# Patient Record
Sex: FEMALE | Race: BLACK OR AFRICAN AMERICAN | Employment: FULL TIME | URBAN - METROPOLITAN AREA
[De-identification: names, ages, dates, MRNs, and addresses within clinical notes are randomized per-mention and may not be internally consistent; named-entity substitution may affect disease eponyms.]

---

## 2017-06-02 ENCOUNTER — HOSPITAL ENCOUNTER (OUTPATIENT)
Dept: GENERAL RADIOLOGY | Age: 22
Discharge: HOME | End: 2017-06-02
Attending: INTERNAL MEDICINE | Admitting: INTERNAL MEDICINE

## 2017-06-02 LAB
ALBUMIN SERPL-MCNC: 4.5 GM/DL (ref 3.4–5)
ALP BLD-CCNC: 65 IU/L (ref 40–128)
ALT SERPL-CCNC: 9 U/L (ref 10–40)
ANION GAP SERPL CALCULATED.3IONS-SCNC: 22 MMOL/L (ref 4–16)
AST SERPL-CCNC: 10 IU/L (ref 15–37)
BILIRUB SERPL-MCNC: 0.6 MG/DL (ref 0–1)
BUN BLDV-MCNC: 8 MG/DL (ref 6–23)
CALCIUM SERPL-MCNC: 10 MG/DL (ref 8.3–10.6)
CHLORIDE BLD-SCNC: 93 MMOL/L (ref 99–110)
CHOLESTEROL: 222 MG/DL
CO2: 22 MMOL/L (ref 21–32)
CREAT SERPL-MCNC: 0.5 MG/DL (ref 0.6–1.1)
GFR AFRICAN AMERICAN: >60 ML/MIN/1.73M2
GFR NON-AFRICAN AMERICAN: >60 ML/MIN/1.73M2
GLUCOSE BLD-MCNC: 319 MG/DL (ref 70–140)
HDLC SERPL-MCNC: 47 MG/DL
LDL CHOLESTEROL DIRECT: 171 MG/DL
POTASSIUM SERPL-SCNC: 4.5 MMOL/L (ref 3.5–5.1)
SODIUM BLD-SCNC: 137 MMOL/L (ref 135–145)
TOTAL PROTEIN: 7.7 GM/DL (ref 6.4–8.2)
TRIGL SERPL-MCNC: 148 MG/DL

## 2017-06-05 LAB
ESTIMATED AVERAGE GLUCOSE: 346 MG/DL
HBA1C MFR BLD: 13.7 % (ref 4.2–6.3)

## 2017-09-11 ENCOUNTER — HOSPITAL ENCOUNTER (OUTPATIENT)
Dept: GENERAL RADIOLOGY | Age: 22
Discharge: HOME | End: 2017-09-11
Attending: INTERNAL MEDICINE | Admitting: INTERNAL MEDICINE

## 2017-09-11 LAB
ALBUMIN SERPL-MCNC: 4.3 GM/DL (ref 3.4–5)
ALP BLD-CCNC: 67 IU/L (ref 40–128)
ALT SERPL-CCNC: 8 U/L (ref 10–40)
ANION GAP SERPL CALCULATED.3IONS-SCNC: 24 MMOL/L (ref 4–16)
AST SERPL-CCNC: 10 IU/L (ref 15–37)
BILIRUB SERPL-MCNC: 0.4 MG/DL (ref 0–1)
BUN BLDV-MCNC: 10 MG/DL (ref 6–23)
CALCIUM SERPL-MCNC: 9.5 MG/DL (ref 8.3–10.6)
CHLORIDE BLD-SCNC: 93 MMOL/L (ref 99–110)
CHOLESTEROL: 208 MG/DL
CO2: 13 MMOL/L (ref 21–32)
CREAT SERPL-MCNC: 0.5 MG/DL (ref 0.6–1.1)
ESTIMATED AVERAGE GLUCOSE: 338 MG/DL
GFR AFRICAN AMERICAN: >60 ML/MIN/1.73M2
GFR NON-AFRICAN AMERICAN: >60 ML/MIN/1.73M2
GLUCOSE FASTING: 359 MG/DL (ref 70–99)
HBA1C MFR BLD: 13.4 % (ref 4.2–6.3)
HDLC SERPL-MCNC: 39 MG/DL
LDL CHOLESTEROL DIRECT: 158 MG/DL
POTASSIUM SERPL-SCNC: 4.7 MMOL/L (ref 3.5–5.1)
SODIUM BLD-SCNC: 130 MMOL/L (ref 135–145)
TOTAL PROTEIN: 7.7 GM/DL (ref 6.4–8.2)
TRIGL SERPL-MCNC: 200 MG/DL

## 2017-12-11 ENCOUNTER — HOSPITAL ENCOUNTER (OUTPATIENT)
Dept: GENERAL RADIOLOGY | Age: 22
Discharge: HOME | End: 2017-12-11
Attending: INTERNAL MEDICINE | Admitting: INTERNAL MEDICINE

## 2017-12-11 LAB
ALBUMIN SERPL-MCNC: 4.1 GM/DL (ref 3.4–5)
ALP BLD-CCNC: 60 IU/L (ref 40–128)
ALT SERPL-CCNC: 7 U/L (ref 10–40)
ANION GAP SERPL CALCULATED.3IONS-SCNC: 21 MMOL/L (ref 4–16)
AST SERPL-CCNC: 9 IU/L (ref 15–37)
BILIRUB SERPL-MCNC: 0.5 MG/DL (ref 0–1)
BUN BLDV-MCNC: 6 MG/DL (ref 6–23)
CALCIUM SERPL-MCNC: 9.1 MG/DL (ref 8.3–10.6)
CHLORIDE BLD-SCNC: 92 MMOL/L (ref 99–110)
CHOLESTEROL: 205 MG/DL
CO2: 20 MMOL/L (ref 21–32)
CREAT SERPL-MCNC: 0.4 MG/DL (ref 0.6–1.1)
CREATININE URINE: 34 MG/DL (ref 28–217)
GFR AFRICAN AMERICAN: >60 ML/MIN/1.73M2
GFR NON-AFRICAN AMERICAN: >60 ML/MIN/1.73M2
GLUCOSE FASTING: 326 MG/DL (ref 70–99)
HDLC SERPL-MCNC: 36 MG/DL
LDL CHOLESTEROL DIRECT: 158 MG/DL
MICROALBUMIN/CREAT 24H UR: <1.2 MG/DL
MICROALBUMIN/CREAT UR-RTO: NORMAL MG/G CREAT (ref 0–30)
POTASSIUM SERPL-SCNC: 4.1 MMOL/L (ref 3.5–5.1)
SODIUM BLD-SCNC: 133 MMOL/L (ref 135–145)
TOTAL PROTEIN: 7.1 GM/DL (ref 6.4–8.2)
TRIGL SERPL-MCNC: 164 MG/DL

## 2017-12-14 LAB
ESTIMATED AVERAGE GLUCOSE: 398 MG/DL
HBA1C MFR BLD: 15.5 % (ref 4.2–6.3)

## 2017-12-28 PROBLEM — E10.10 DKA, TYPE 1, NOT AT GOAL (HCC): Status: ACTIVE | Noted: 2017-12-28

## 2018-01-15 ENCOUNTER — HOSPITAL ENCOUNTER (OUTPATIENT)
Dept: DIABETES SERVICES | Age: 23
Discharge: HOME | End: 2018-01-31
Attending: INTERNAL MEDICINE | Admitting: INTERNAL MEDICINE

## 2018-01-15 VITALS — BODY MASS INDEX: 27.36 KG/M2 | HEIGHT: 63 IN | WEIGHT: 154.4 LBS

## 2018-01-15 NOTE — LETTER
of actual CHO intake with BG results. May benefit from additional follow up to reinforce and assess understanding of meal plan and self management skills. PATIENT SELECTED GOAL: record BG results on log sheet, begin CHO counting at meals and snacks       There will be a follow-up in 4 months to evaluate A1C, carbohydrate recall, attainment of their chosen goal, and self identified support plan. Thank you for referring this patient to our program.  Please do not hesitate to call if you have any questions at [phone].         Sincerely,    Electronically signed by Liana Little RD, LD on 1/15/2018 at 11:17 AM      Diabetes Nurse Educators:   Registered Licensed Dietitians:  Rachel Escobedo RN, BSN, CDE              Ju Kent RD, LD, CDE  Rosario Ramirez, RN, BSN, [facility]

## 2018-01-22 PROBLEM — R80.9 PROTEINURIA: Status: ACTIVE | Noted: 2018-01-22

## 2018-01-22 PROBLEM — N18.1 CHRONIC KIDNEY DISEASE (CKD) STAGE G1/A1, GLOMERULAR FILTRATION RATE (GFR) EQUAL TO OR GREATER THAN 90 ML/MIN/1.73 SQUARE METER AND ALBUMINURIA CREATININE RATIO LESS THAN 30 MG/G: Status: ACTIVE | Noted: 2018-01-22

## 2018-01-22 PROBLEM — E11.9 DM (DIABETES MELLITUS) (HCC): Status: ACTIVE | Noted: 2018-01-22

## 2018-02-01 ENCOUNTER — HOSPITAL ENCOUNTER (OUTPATIENT)
Dept: OTHER | Age: 23
Discharge: HOME | End: 2018-02-28
Attending: INTERNAL MEDICINE | Admitting: INTERNAL MEDICINE

## 2018-03-26 ENCOUNTER — HOSPITAL ENCOUNTER (OUTPATIENT)
Dept: DIABETES SERVICES | Age: 23
Discharge: HOME | End: 2018-03-31
Attending: INTERNAL MEDICINE | Admitting: INTERNAL MEDICINE

## 2018-03-26 VITALS — WEIGHT: 140 LBS | BODY MASS INDEX: 23.32 KG/M2 | HEIGHT: 65 IN

## 2018-04-01 ENCOUNTER — HOSPITAL ENCOUNTER (OUTPATIENT)
Dept: OTHER | Age: 23
Discharge: HOME | End: 2018-04-30
Attending: INTERNAL MEDICINE | Admitting: INTERNAL MEDICINE

## 2018-04-02 ENCOUNTER — HOSPITAL ENCOUNTER (OUTPATIENT)
Dept: GENERAL RADIOLOGY | Age: 23
Discharge: HOME | End: 2018-04-02
Attending: INTERNAL MEDICINE | Admitting: INTERNAL MEDICINE

## 2018-04-02 LAB
ALBUMIN SERPL-MCNC: 3.9 GM/DL (ref 3.4–5)
ANION GAP SERPL CALCULATED.3IONS-SCNC: 18 MMOL/L (ref 4–16)
BACTERIA: ABNORMAL /HPF
BILIRUBIN URINE: NEGATIVE MG/DL
BLOOD, URINE: NEGATIVE
BUN BLDV-MCNC: 7 MG/DL (ref 6–23)
CALCIUM SERPL-MCNC: 9.3 MG/DL (ref 8.3–10.6)
CHLORIDE BLD-SCNC: 100 MMOL/L (ref 99–110)
CLARITY: CLEAR
CO2: 19 MMOL/L (ref 21–32)
COLOR: YELLOW
CREAT SERPL-MCNC: 0.4 MG/DL (ref 0.6–1.1)
CREATININE URINE: 89.5 MG/DL (ref 28–217)
GFR AFRICAN AMERICAN: >60 ML/MIN/1.73M2
GFR NON-AFRICAN AMERICAN: >60 ML/MIN/1.73M2
GLUCOSE BLD-MCNC: 328 MG/DL (ref 70–99)
GLUCOSE, URINE: >500 MG/DL
KETONES, URINE: ABNORMAL MG/DL
LEUKOCYTE ESTERASE, URINE: NEGATIVE
MAGNESIUM: 1.7 MG/DL (ref 1.8–2.4)
MUCUS: ABNORMAL HPF
NITRITE URINE, QUANTITATIVE: NEGATIVE
PH, URINE: 5 (ref 5–8)
PHOSPHORUS: 3.7 MG/DL (ref 2.5–4.9)
POTASSIUM SERPL-SCNC: 4.5 MMOL/L (ref 3.5–5.1)
PROT/CREAT RATIO, UR: 0.1
PROTEIN UA: NEGATIVE MG/DL
RBC URINE: <1 /HPF (ref 0–6)
SODIUM BLD-SCNC: 137 MMOL/L (ref 135–145)
SPECIFIC GRAVITY UA: 1.04 (ref 1–1.03)
SQUAMOUS EPITHELIAL: 1 /HPF
TRICHOMONAS: ABNORMAL /HPF
URINE TOTAL PROTEIN: 10.3 MG/DL
UROBILINOGEN, URINE: NORMAL MG/DL (ref 0.2–1)
WBC UA: 1 /HPF (ref 0–5)

## 2018-05-01 ENCOUNTER — HOSPITAL ENCOUNTER (OUTPATIENT)
Dept: OTHER | Age: 23
Discharge: HOME | End: 2018-05-31
Attending: INTERNAL MEDICINE | Admitting: INTERNAL MEDICINE

## 2018-06-01 ENCOUNTER — HOSPITAL ENCOUNTER (OUTPATIENT)
Dept: OTHER | Age: 23
Discharge: HOME | End: 2018-06-30
Attending: INTERNAL MEDICINE | Admitting: INTERNAL MEDICINE

## 2019-03-05 LAB
A/G RATIO: 1.5 (ref 1.1–2.2)
ALBUMIN SERPL-MCNC: 4.4 G/DL (ref 3.4–5)
ALP BLD-CCNC: 63 U/L (ref 40–129)
ALT SERPL-CCNC: 6 U/L (ref 10–40)
ANION GAP SERPL CALCULATED.3IONS-SCNC: 18 MMOL/L (ref 3–16)
AST SERPL-CCNC: 7 U/L (ref 15–37)
BILIRUB SERPL-MCNC: 0.6 MG/DL (ref 0–1)
BUN BLDV-MCNC: 8 MG/DL (ref 7–20)
CALCIUM SERPL-MCNC: 9.6 MG/DL (ref 8.3–10.6)
CHLORIDE BLD-SCNC: 96 MMOL/L (ref 99–110)
CHOLESTEROL, FASTING: 202 MG/DL (ref 0–199)
CO2: 23 MMOL/L (ref 21–32)
CREAT SERPL-MCNC: <0.5 MG/DL (ref 0.6–1.1)
GFR AFRICAN AMERICAN: >60
GFR NON-AFRICAN AMERICAN: >60
GLOBULIN: 3 G/DL
GLUCOSE BLD-MCNC: 393 MG/DL (ref 70–99)
HDLC SERPL-MCNC: 47 MG/DL (ref 40–60)
LDL CHOLESTEROL CALCULATED: 137 MG/DL
POTASSIUM SERPL-SCNC: 4.3 MMOL/L (ref 3.5–5.1)
SODIUM BLD-SCNC: 137 MMOL/L (ref 136–145)
TOTAL PROTEIN: 7.4 G/DL (ref 6.4–8.2)
TRIGLYCERIDE, FASTING: 88 MG/DL (ref 0–150)
VLDLC SERPL CALC-MCNC: 18 MG/DL

## 2019-03-06 LAB
ESTIMATED AVERAGE GLUCOSE: 346.5 MG/DL
HBA1C MFR BLD: 13.7 %

## 2019-09-04 LAB
A/G RATIO: 1.6 (ref 1.1–2.2)
ALBUMIN SERPL-MCNC: 4.7 G/DL (ref 3.4–5)
ALP BLD-CCNC: 68 U/L (ref 40–129)
ALT SERPL-CCNC: 7 U/L (ref 10–40)
ANION GAP SERPL CALCULATED.3IONS-SCNC: 17 MMOL/L (ref 3–16)
AST SERPL-CCNC: 7 U/L (ref 15–37)
BILIRUB SERPL-MCNC: 0.5 MG/DL (ref 0–1)
BUN BLDV-MCNC: 9 MG/DL (ref 7–20)
CALCIUM SERPL-MCNC: 9.8 MG/DL (ref 8.3–10.6)
CHLORIDE BLD-SCNC: 91 MMOL/L (ref 99–110)
CHOLESTEROL, FASTING: 185 MG/DL (ref 0–199)
CO2: 20 MMOL/L (ref 21–32)
CREAT SERPL-MCNC: 0.5 MG/DL (ref 0.6–1.1)
GFR AFRICAN AMERICAN: >60
GFR NON-AFRICAN AMERICAN: >60
GLOBULIN: 2.9 G/DL
GLUCOSE FASTING: 397 MG/DL (ref 70–99)
HDLC SERPL-MCNC: 40 MG/DL (ref 40–60)
LDL CHOLESTEROL CALCULATED: 107 MG/DL
POTASSIUM SERPL-SCNC: 4.6 MMOL/L (ref 3.5–5.1)
SODIUM BLD-SCNC: 128 MMOL/L (ref 136–145)
TOTAL PROTEIN: 7.6 G/DL (ref 6.4–8.2)
TRIGLYCERIDE, FASTING: 191 MG/DL (ref 0–150)
VLDLC SERPL CALC-MCNC: 38 MG/DL

## 2019-09-05 LAB
ESTIMATED AVERAGE GLUCOSE: 386.7 MG/DL
HBA1C MFR BLD: 15.1 %

## 2020-03-13 ENCOUNTER — HOSPITAL ENCOUNTER (INPATIENT)
Age: 25
LOS: 3 days | Discharge: HOME | DRG: 420 | End: 2020-03-16
Attending: EMERGENCY MEDICINE | Admitting: INTERNAL MEDICINE
Payer: MEDICAID

## 2020-03-13 ENCOUNTER — APPOINTMENT (OUTPATIENT)
Dept: GENERAL RADIOLOGY | Age: 25
DRG: 420 | End: 2020-03-13
Payer: MEDICAID

## 2020-03-13 PROBLEM — J10.1 INFLUENZA A: Status: ACTIVE | Noted: 2020-03-13

## 2020-03-13 LAB
ALBUMIN SERPL-MCNC: 4.6 GM/DL (ref 3.4–5)
ALP BLD-CCNC: 88 IU/L (ref 40–129)
ALT SERPL-CCNC: 7 U/L (ref 10–40)
ANION GAP SERPL CALCULATED.3IONS-SCNC: 18 MMOL/L (ref 4–16)
ANION GAP SERPL CALCULATED.3IONS-SCNC: 28 MMOL/L (ref 4–16)
AST SERPL-CCNC: 9 IU/L (ref 15–37)
BACTERIA: NEGATIVE /HPF
BASOPHILS ABSOLUTE: 0.1 K/CU MM
BASOPHILS RELATIVE PERCENT: 0.6 % (ref 0–1)
BETA-HYDROXYBUTYRATE: >20.8 MG/DL (ref 0–3)
BILIRUB SERPL-MCNC: 0.2 MG/DL (ref 0–1)
BILIRUBIN URINE: NEGATIVE MG/DL
BLOOD, URINE: NEGATIVE
BUN BLDV-MCNC: 12 MG/DL (ref 6–23)
BUN BLDV-MCNC: 13 MG/DL (ref 6–23)
CALCIUM SERPL-MCNC: 8.2 MG/DL (ref 8.3–10.6)
CALCIUM SERPL-MCNC: 9.4 MG/DL (ref 8.3–10.6)
CHLORIDE BLD-SCNC: 110 MMOL/L (ref 99–110)
CHLORIDE BLD-SCNC: 99 MMOL/L (ref 99–110)
CLARITY: CLEAR
CO2: 4 MMOL/L (ref 21–32)
CO2: 6 MMOL/L (ref 21–32)
COLOR: ABNORMAL
CREAT SERPL-MCNC: 0.4 MG/DL (ref 0.6–1.1)
CREAT SERPL-MCNC: 0.5 MG/DL (ref 0.6–1.1)
DIFFERENTIAL TYPE: ABNORMAL
EOSINOPHILS ABSOLUTE: 0 K/CU MM
EOSINOPHILS RELATIVE PERCENT: 0 % (ref 0–3)
GFR AFRICAN AMERICAN: >60 ML/MIN/1.73M2
GFR AFRICAN AMERICAN: >60 ML/MIN/1.73M2
GFR NON-AFRICAN AMERICAN: >60 ML/MIN/1.73M2
GFR NON-AFRICAN AMERICAN: >60 ML/MIN/1.73M2
GLUCOSE BLD-MCNC: 214 MG/DL (ref 70–99)
GLUCOSE BLD-MCNC: 224 MG/DL (ref 70–99)
GLUCOSE BLD-MCNC: 279 MG/DL (ref 70–99)
GLUCOSE BLD-MCNC: 297 MG/DL (ref 70–99)
GLUCOSE BLD-MCNC: 319 MG/DL (ref 70–99)
GLUCOSE BLD-MCNC: 332 MG/DL (ref 70–99)
GLUCOSE BLD-MCNC: 336 MG/DL (ref 70–99)
GLUCOSE BLD-MCNC: 396 MG/DL (ref 70–99)
GLUCOSE BLD-MCNC: 427 MG/DL (ref 70–99)
GLUCOSE BLD-MCNC: 470 MG/DL (ref 70–99)
GLUCOSE BLD-MCNC: 471 MG/DL (ref 70–99)
GLUCOSE BLD-MCNC: 524 MG/DL (ref 70–99)
GLUCOSE, URINE: >500 MG/DL
HCG QUALITATIVE: NEGATIVE
HCT VFR BLD CALC: 56 % (ref 37–47)
HEMOGLOBIN: 17.8 GM/DL (ref 12.5–16)
IMMATURE NEUTROPHIL %: 1.2 % (ref 0–0.43)
KETONES, URINE: ABNORMAL MG/DL
LACTIC ACID, SEPSIS: 1.5 MMOL/L (ref 0.5–1.9)
LACTIC ACID, SEPSIS: 1.6 MMOL/L (ref 0.5–1.9)
LEUKOCYTE ESTERASE, URINE: NEGATIVE
LIPASE: 14 IU/L (ref 13–60)
LYMPHOCYTES ABSOLUTE: 1.7 K/CU MM
LYMPHOCYTES RELATIVE PERCENT: 12.7 % (ref 24–44)
MCH RBC QN AUTO: 29.4 PG (ref 27–31)
MCHC RBC AUTO-ENTMCNC: 31.8 % (ref 32–36)
MCV RBC AUTO: 92.4 FL (ref 78–100)
MONOCYTES ABSOLUTE: 0.5 K/CU MM
MONOCYTES RELATIVE PERCENT: 3.8 % (ref 0–4)
MUCUS: ABNORMAL HPF
NITRITE URINE, QUANTITATIVE: NEGATIVE
NUCLEATED RBC %: 0 %
PDW BLD-RTO: 11.6 % (ref 11.7–14.9)
PH VENOUS: 6.88 (ref 7.32–7.42)
PH, URINE: 5 (ref 5–8)
PLATELET # BLD: 337 K/CU MM (ref 140–440)
PMV BLD AUTO: 10.1 FL (ref 7.5–11.1)
POTASSIUM SERPL-SCNC: 4.2 MMOL/L (ref 3.5–5.1)
POTASSIUM SERPL-SCNC: 5.1 MMOL/L (ref 3.5–5.1)
PROCALCITONIN: 0.19
PROTEIN UA: 30 MG/DL
RAPID INFLUENZA  B AGN: NEGATIVE
RAPID INFLUENZA A AGN: POSITIVE
RBC # BLD: 6.06 M/CU MM (ref 4.2–5.4)
RBC URINE: <1 /HPF (ref 0–6)
SEGMENTED NEUTROPHILS ABSOLUTE COUNT: 10.8 K/CU MM
SEGMENTED NEUTROPHILS RELATIVE PERCENT: 81.7 % (ref 36–66)
SODIUM BLD-SCNC: 131 MMOL/L (ref 135–145)
SODIUM BLD-SCNC: 134 MMOL/L (ref 135–145)
SPECIFIC GRAVITY UA: 1.02 (ref 1–1.03)
SQUAMOUS EPITHELIAL: 1 /HPF
TOTAL IMMATURE NEUTOROPHIL: 0.16 K/CU MM
TOTAL NUCLEATED RBC: 0 K/CU MM
TOTAL PROTEIN: 9.2 GM/DL (ref 6.4–8.2)
TRICHOMONAS: ABNORMAL /HPF
UROBILINOGEN, URINE: NORMAL MG/DL (ref 0.2–1)
WBC # BLD: 13.2 K/CU MM (ref 4–10.5)
WBC UA: <1 /HPF (ref 0–5)

## 2020-03-13 PROCEDURE — 2580000003 HC RX 258

## 2020-03-13 PROCEDURE — 2580000003 HC RX 258: Performed by: INTERNAL MEDICINE

## 2020-03-13 PROCEDURE — 6370000000 HC RX 637 (ALT 250 FOR IP): Performed by: INTERNAL MEDICINE

## 2020-03-13 PROCEDURE — 85025 COMPLETE CBC W/AUTO DIFF WBC: CPT

## 2020-03-13 PROCEDURE — 2500000003 HC RX 250 WO HCPCS: Performed by: EMERGENCY MEDICINE

## 2020-03-13 PROCEDURE — 2580000003 HC RX 258: Performed by: PHYSICIAN ASSISTANT

## 2020-03-13 PROCEDURE — 2580000003 HC RX 258: Performed by: EMERGENCY MEDICINE

## 2020-03-13 PROCEDURE — 83690 ASSAY OF LIPASE: CPT

## 2020-03-13 PROCEDURE — 81001 URINALYSIS AUTO W/SCOPE: CPT

## 2020-03-13 PROCEDURE — 96372 THER/PROPH/DIAG INJ SC/IM: CPT

## 2020-03-13 PROCEDURE — 71045 X-RAY EXAM CHEST 1 VIEW: CPT

## 2020-03-13 PROCEDURE — 83605 ASSAY OF LACTIC ACID: CPT

## 2020-03-13 PROCEDURE — 87804 INFLUENZA ASSAY W/OPTIC: CPT

## 2020-03-13 PROCEDURE — 82010 KETONE BODYS QUAN: CPT

## 2020-03-13 PROCEDURE — 6370000000 HC RX 637 (ALT 250 FOR IP): Performed by: PHYSICIAN ASSISTANT

## 2020-03-13 PROCEDURE — 6360000002 HC RX W HCPCS: Performed by: EMERGENCY MEDICINE

## 2020-03-13 PROCEDURE — 80053 COMPREHEN METABOLIC PANEL: CPT

## 2020-03-13 PROCEDURE — 84703 CHORIONIC GONADOTROPIN ASSAY: CPT

## 2020-03-13 PROCEDURE — 80048 BASIC METABOLIC PNL TOTAL CA: CPT

## 2020-03-13 PROCEDURE — 6360000002 HC RX W HCPCS: Performed by: PHYSICIAN ASSISTANT

## 2020-03-13 PROCEDURE — 87086 URINE CULTURE/COLONY COUNT: CPT

## 2020-03-13 PROCEDURE — 6360000002 HC RX W HCPCS: Performed by: NURSE PRACTITIONER

## 2020-03-13 PROCEDURE — 2580000003 HC RX 258: Performed by: NURSE PRACTITIONER

## 2020-03-13 PROCEDURE — 87040 BLOOD CULTURE FOR BACTERIA: CPT

## 2020-03-13 PROCEDURE — 96374 THER/PROPH/DIAG INJ IV PUSH: CPT

## 2020-03-13 PROCEDURE — 94761 N-INVAS EAR/PLS OXIMETRY MLT: CPT

## 2020-03-13 PROCEDURE — 6370000000 HC RX 637 (ALT 250 FOR IP): Performed by: NURSE PRACTITIONER

## 2020-03-13 PROCEDURE — 2000000000 HC ICU R&B

## 2020-03-13 PROCEDURE — 99285 EMERGENCY DEPT VISIT HI MDM: CPT

## 2020-03-13 PROCEDURE — 96375 TX/PRO/DX INJ NEW DRUG ADDON: CPT

## 2020-03-13 PROCEDURE — 82962 GLUCOSE BLOOD TEST: CPT

## 2020-03-13 PROCEDURE — 82800 BLOOD PH: CPT

## 2020-03-13 PROCEDURE — 84145 PROCALCITONIN (PCT): CPT

## 2020-03-13 PROCEDURE — C9113 INJ PANTOPRAZOLE SODIUM, VIA: HCPCS | Performed by: NURSE PRACTITIONER

## 2020-03-13 RX ORDER — MAGNESIUM SULFATE 1 G/100ML
1 INJECTION INTRAVENOUS PRN
Status: DISCONTINUED | OUTPATIENT
Start: 2020-03-13 | End: 2020-03-14

## 2020-03-13 RX ORDER — DICYCLOMINE HYDROCHLORIDE 10 MG/ML
20 INJECTION INTRAMUSCULAR ONCE
Status: COMPLETED | OUTPATIENT
Start: 2020-03-13 | End: 2020-03-13

## 2020-03-13 RX ORDER — 0.9 % SODIUM CHLORIDE 0.9 %
1000 INTRAVENOUS SOLUTION INTRAVENOUS ONCE
Status: COMPLETED | OUTPATIENT
Start: 2020-03-13 | End: 2020-03-13

## 2020-03-13 RX ORDER — INSULIN GLARGINE 100 [IU]/ML
45 INJECTION, SOLUTION SUBCUTANEOUS NIGHTLY
Status: ON HOLD | COMMUNITY
End: 2020-03-16 | Stop reason: SDUPTHER

## 2020-03-13 RX ORDER — PREGABALIN 100 MG/1
100 CAPSULE ORAL 2 TIMES DAILY
Status: DISCONTINUED | OUTPATIENT
Start: 2020-03-13 | End: 2020-03-16 | Stop reason: HOSPADM

## 2020-03-13 RX ORDER — DEXTROSE AND SODIUM CHLORIDE 5; .45 G/100ML; G/100ML
INJECTION, SOLUTION INTRAVENOUS CONTINUOUS PRN
Status: DISCONTINUED | OUTPATIENT
Start: 2020-03-13 | End: 2020-03-14 | Stop reason: ALTCHOICE

## 2020-03-13 RX ORDER — DEXTROSE MONOHYDRATE 25 G/50ML
12.5 INJECTION, SOLUTION INTRAVENOUS PRN
Status: DISCONTINUED | OUTPATIENT
Start: 2020-03-13 | End: 2020-03-16 | Stop reason: HOSPADM

## 2020-03-13 RX ORDER — PANTOPRAZOLE SODIUM 40 MG/10ML
40 INJECTION, POWDER, LYOPHILIZED, FOR SOLUTION INTRAVENOUS DAILY
Status: DISCONTINUED | OUTPATIENT
Start: 2020-03-13 | End: 2020-03-16 | Stop reason: HOSPADM

## 2020-03-13 RX ORDER — OSELTAMIVIR PHOSPHATE 6 MG/ML
75 FOR SUSPENSION ORAL 2 TIMES DAILY
Status: DISCONTINUED | OUTPATIENT
Start: 2020-03-13 | End: 2020-03-16 | Stop reason: HOSPADM

## 2020-03-13 RX ORDER — SODIUM CHLORIDE 450 MG/100ML
INJECTION, SOLUTION INTRAVENOUS CONTINUOUS
Status: DISCONTINUED | OUTPATIENT
Start: 2020-03-13 | End: 2020-03-14 | Stop reason: ALTCHOICE

## 2020-03-13 RX ORDER — POTASSIUM CHLORIDE 7.45 MG/ML
10 INJECTION INTRAVENOUS PRN
Status: DISCONTINUED | OUTPATIENT
Start: 2020-03-13 | End: 2020-03-16 | Stop reason: HOSPADM

## 2020-03-13 RX ORDER — DEXTROSE MONOHYDRATE 50 MG/ML
100 INJECTION, SOLUTION INTRAVENOUS PRN
Status: DISCONTINUED | OUTPATIENT
Start: 2020-03-13 | End: 2020-03-16 | Stop reason: HOSPADM

## 2020-03-13 RX ORDER — LOSARTAN POTASSIUM 25 MG/1
25 TABLET ORAL DAILY
Status: DISCONTINUED | OUTPATIENT
Start: 2020-03-13 | End: 2020-03-16 | Stop reason: HOSPADM

## 2020-03-13 RX ORDER — GUAIFENESIN 600 MG/1
600 TABLET, EXTENDED RELEASE ORAL 2 TIMES DAILY
Status: DISCONTINUED | OUTPATIENT
Start: 2020-03-13 | End: 2020-03-16 | Stop reason: HOSPADM

## 2020-03-13 RX ORDER — SODIUM CHLORIDE 9 MG/ML
INJECTION, SOLUTION INTRAVENOUS
Status: COMPLETED
Start: 2020-03-13 | End: 2020-03-13

## 2020-03-13 RX ORDER — SODIUM CHLORIDE 450 MG/100ML
INJECTION, SOLUTION INTRAVENOUS
Status: COMPLETED
Start: 2020-03-13 | End: 2020-03-13

## 2020-03-13 RX ORDER — NICOTINE POLACRILEX 4 MG
15 LOZENGE BUCCAL PRN
Status: DISCONTINUED | OUTPATIENT
Start: 2020-03-13 | End: 2020-03-16 | Stop reason: HOSPADM

## 2020-03-13 RX ORDER — OSELTAMIVIR PHOSPHATE 75 MG/1
75 CAPSULE ORAL ONCE
Status: COMPLETED | OUTPATIENT
Start: 2020-03-13 | End: 2020-03-13

## 2020-03-13 RX ORDER — ONDANSETRON 2 MG/ML
4 INJECTION INTRAMUSCULAR; INTRAVENOUS ONCE
Status: COMPLETED | OUTPATIENT
Start: 2020-03-13 | End: 2020-03-13

## 2020-03-13 RX ADMIN — PANTOPRAZOLE SODIUM 40 MG: 40 INJECTION, POWDER, FOR SOLUTION INTRAVENOUS at 21:13

## 2020-03-13 RX ADMIN — SODIUM CHLORIDE 1000 ML: 9 INJECTION, SOLUTION INTRAVENOUS at 13:39

## 2020-03-13 RX ADMIN — OSELTAMIVIR PHOSPHATE 75 MG: 6 FOR SUSPENSION ORAL at 21:13

## 2020-03-13 RX ADMIN — DEXTROSE AND SODIUM CHLORIDE: 5; 450 INJECTION, SOLUTION INTRAVENOUS at 23:12

## 2020-03-13 RX ADMIN — OSELTAMIVIR PHOSPHATE 75 MG: 75 CAPSULE ORAL at 16:15

## 2020-03-13 RX ADMIN — SODIUM CHLORIDE 1000 ML: 9 INJECTION, SOLUTION INTRAVENOUS at 15:20

## 2020-03-13 RX ADMIN — SODIUM CHLORIDE 4 UNITS/HR: 9 INJECTION, SOLUTION INTRAVENOUS at 19:47

## 2020-03-13 RX ADMIN — SODIUM CHLORIDE: 4.5 INJECTION, SOLUTION INTRAVENOUS at 22:03

## 2020-03-13 RX ADMIN — SODIUM CHLORIDE 0.1 UNITS/KG/HR: 9 INJECTION, SOLUTION INTRAVENOUS at 15:56

## 2020-03-13 RX ADMIN — LOSARTAN POTASSIUM 25 MG: 25 TABLET, FILM COATED ORAL at 16:59

## 2020-03-13 RX ADMIN — ENOXAPARIN SODIUM 40 MG: 40 INJECTION SUBCUTANEOUS at 16:59

## 2020-03-13 RX ADMIN — SODIUM CHLORIDE: 4.5 INJECTION, SOLUTION INTRAVENOUS at 16:46

## 2020-03-13 RX ADMIN — FAMOTIDINE 20 MG: 10 INJECTION, SOLUTION INTRAVENOUS at 15:20

## 2020-03-13 RX ADMIN — ONDANSETRON 4 MG: 2 INJECTION INTRAMUSCULAR; INTRAVENOUS at 13:39

## 2020-03-13 RX ADMIN — SODIUM CHLORIDE 1000 ML: 9 INJECTION, SOLUTION INTRAVENOUS at 18:44

## 2020-03-13 RX ADMIN — GUAIFENESIN 600 MG: 600 TABLET, EXTENDED RELEASE ORAL at 21:13

## 2020-03-13 RX ADMIN — Medication 1000 ML: at 18:44

## 2020-03-13 RX ADMIN — DICYCLOMINE HYDROCHLORIDE 20 MG: 10 INJECTION INTRAMUSCULAR at 15:20

## 2020-03-13 ASSESSMENT — PAIN SCALES - GENERAL
PAINLEVEL_OUTOF10: 0
PAINLEVEL_OUTOF10: 10
PAINLEVEL_OUTOF10: 10
PAINLEVEL_OUTOF10: 0

## 2020-03-13 ASSESSMENT — PAIN DESCRIPTION - PAIN TYPE
TYPE: ACUTE PAIN
TYPE: ACUTE PAIN

## 2020-03-13 ASSESSMENT — PAIN DESCRIPTION - DESCRIPTORS
DESCRIPTORS: ACHING
DESCRIPTORS: ACHING

## 2020-03-13 ASSESSMENT — PAIN DESCRIPTION - LOCATION
LOCATION: ABDOMEN
LOCATION: ABDOMEN

## 2020-03-13 ASSESSMENT — PAIN DESCRIPTION - PROGRESSION: CLINICAL_PROGRESSION: NOT CHANGED

## 2020-03-13 ASSESSMENT — PAIN DESCRIPTION - FREQUENCY
FREQUENCY: CONTINUOUS
FREQUENCY: CONTINUOUS

## 2020-03-13 ASSESSMENT — PAIN - FUNCTIONAL ASSESSMENT: PAIN_FUNCTIONAL_ASSESSMENT: ACTIVITIES ARE NOT PREVENTED

## 2020-03-13 ASSESSMENT — PAIN DESCRIPTION - ONSET: ONSET: GRADUAL

## 2020-03-13 NOTE — ED NOTES
Report received from [facility]. Pt updated, asked if she could have any water. Will ask physician.       Miguel Moreno RN  03/13/20 1053

## 2020-03-13 NOTE — PROGRESS NOTES
Medication History  [facility]    Patient Name: Mirtha Ghosh 1995     Medication history has been completed by: Zulema Anthony CPhT    Source(s) of information: Patient and Retail Pharmacy    Primary Care Physician: Savanna Dent MD     Pharmacy: Home [facility]    Allergies as of 03/13/2020    (No Known Allergies)        Prior to Admission medications    Medication Sig Start Date End Date Taking? Authorizing Provider   insulin glargine (LANTUS) 100 UNIT/ML injection vial Inject 45 Units into the skin nightly   Yes Historical Provider, MD   insulin aspart (NOVOLOG) 100 UNIT/ML injection vial Inject into the skin See Admin Instructions 20 units with breakfast and lunch and 25 units with dinner and at bedtime   Yes Historical Provider, MD   losartan (COZAAR) 25 MG tablet Take 1 tablet by mouth daily 12/3/19   Jolene Schirmer, MD       Medications added or changed (ex.  new medication, dosage change, interval change, formulation change):  Novolog dose clarified as 20 units with breakfast, lunch and 25 units with dinner and at bedtime per patient  Lantus dose clarified as 45 units hs per patient    Medications removed from list (include reason, ex. noncompliance, medication cost, therapy complete etc.):   Test strips list clean up  Pen needles list clean up  Syringe list clean up  Lancets list clean up  Lyrica no recent claims    Comments:  Updated med list per patient and verified with retail pharmacy  Patient states she used her insulin today however per retail pharmacy has not filled since Sept. 2019  Also losartan has not been filled since 12/5/19 for 30 day supply    To my knowledge the above medication history is accurate as of 3/13/2020 2:52 PM.   Zulema Anthony CPhT   3/13/2020 2:52 PM

## 2020-03-13 NOTE — ED PROVIDER NOTES
conjunction with the Advanced Practice Provider. For all further details of the patient's emergency department visit, please see the Advanced Practice Provider's documentation.         DataMentors, DO  03/13/20 1434       DataMentors, DO  03/13/20 152

## 2020-03-13 NOTE — PROGRESS NOTES
Handoff insulin drip infusing at 8ml/hr, dr Rohini Grissom is on the chart and we are unable to sign off in mar, verified per [facility] YAMIL luong and courtney luong

## 2020-03-13 NOTE — CONSULTS
LUNGS:  Has Vesicular Breath Sounds,   CARDIOVASCULAR:  Normal apical impulse, regular rate and rhythm, normal S1 and S2, no S3 or S4, and has no  murmur   ABDOMEN:  No scars, normal bowel sounds, soft, non-distended, non-tender, no masses palpated, no hepatolienomegaly  Musculoskeletal: Normal  Extremities: Normal, peripheral pulses normal, , has no edema   NEUROLOGIC:  Awake, alert, oriented to name, place and time. Cranial nerves II-XII are grossly intact. Motor is  intact. Sensory neuropathy. ,  and gait is normal.    DATA:    CBC:   Recent Labs     03/13/20  1325   WBC 13.2*   HGB 17.8*       CMP:  Recent Labs     03/13/20  1325   *   K 5.1   CL 99   CO2 4*   BUN 13   CREATININE 0.5*   CALCIUM 9.4   PROT 9.2*   LABALBU 4.6   BILITOT 0.2   ALKPHOS 88   AST 9*   ALT 7*     Lipids:   Lab Results   Component Value Date    CHOL 205 12/11/2017    HDL 40 09/04/2019    TRIG 164 12/11/2017     Glucose:   Recent Labs     03/13/20  1700 03/13/20  1801 03/13/20  1853   POCGLU 396* 336* 319*     Hemoglobin A1C:   Lab Results   Component Value Date    LABA1C 15.1 09/04/2019     Results for Jalene Aase (MRN [id number]) as of 3/13/2020 20:51   Ref. Range 3/13/2020 13:35   Rapid Influenza A Ag Latest Ref Range: NEGATIVE  POSITIVE (A)         Free T4: No results found for: T4FREE  Free T3: No results found for: FT3  TSH High Sensitivity: No results found for: [facility]    Xr Chest Portable    Result Date: 3/13/2020  EXAMINATION: ONE XRAY VIEW OF THE CHEST 3/13/2020 1:53 pm COMPARISON: 12/28/2017. HISTORY: ORDERING SYSTEM PROVIDED HISTORY: cough TECHNOLOGIST PROVIDED HISTORY: Reason for exam:->cough Reason for Exam: cough Acuity: Acute Type of Exam: Initial FINDINGS: Lungs are clear. Cardiac and mediastinal silhouettes are within normal limits. No pneumothoraces. Bony structures appear intact. No evidence for acute cardiopulmonary process.        Scheduled Medicines   Medications:    enoxaparin  40 mg Subcutaneous Daily    oseltamivir 6mg/ml  75 mg Oral BID    losartan  25 mg Oral Daily    pantoprazole  40 mg Intravenous Daily    guaiFENesin  600 mg Oral BID      Infusions:    dextrose      sodium chloride 250 mL/hr at 03/13/20 1646    dextrose 5 % and 0.45 % NaCl      insulin           IMPRESSION    Patient Active Problem List   Diagnosis    DKA (diabetic ketoacidoses) ([facility]    Diabetic ketoacidosis ([facility]    DKA, type 1, not at goal [facility])    DM (diabetes mellitus) ([facility]    Proteinuria    Chronic kidney disease (CKD) stage G1/A1, glomerular filtration rate (GFR) equal to or greater than 90 mL/min/1.73 square meter and albuminuria creatinine ratio less than 30 mg/g    Influenza A         RECOMMENDATIONS:      1. Reviewed POC blood glucose . Labs and X ray results   2. Reviewed Home and Current Medicines   3. Will Start On IV insulin drip protocol   4. Monitor Blood glucose frequently   5. Modify  the dose of Insulin  as needed        Will follow with you  Again thank you for sharing pt's care with me.      Truly yours,       Vicenta Jacinto MD

## 2020-03-13 NOTE — ED PROVIDER NOTES
Inject 45 Units into the skin nightly      insulin aspart (NOVOLOG) 100 UNIT/ML injection vial Inject into the skin See Admin Instructions 20 units with breakfast and lunch and 25 units with dinner and at bedtime      losartan (COZAAR) 25 MG tablet Take 1 tablet by mouth daily 30 tablet 5       ALLERGIES    No Known Allergies    SOCIAL AND FAMILY HISTORY    Social History     Socioeconomic History    Marital status: Single     Spouse name: Not on file    Number of children: Not on file    Years of education: Not on file    Highest education level: Not on file   Occupational History    Not on file   Social Needs    Financial resource strain: Not on file    Food insecurity     Worry: Not on file     Inability: Not on file    Transportation needs     Medical: Not on file     Non-medical: Not on file   Tobacco Use    Smoking status: Never Smoker    Smokeless tobacco: Never Used   Substance and Sexual Activity    Alcohol use: No    Drug use: No    Sexual activity: Not on file   Lifestyle    Physical activity     Days per week: Not on file     Minutes per session: Not on file    Stress: Not on file   Relationships    Social connections     Talks on phone: Not on file     Gets together: Not on file     Attends [employer] service: Not on file     Active member of club or organization: Not on file     Attends meetings of clubs or organizations: Not on file     Relationship status: Not on file    Intimate partner violence     Fear of current or ex partner: Not on file     Emotionally abused: Not on file     Physically abused: Not on file     Forced sexual activity: Not on file   Other Topics Concern    Not on file   Social History Narrative    Not on file     Family History   Problem Relation Age of Onset    Diabetes Mother     High Blood Pressure Mother     Kidney Disease Mother     Vision Loss Mother          PHYSICAL EXAM    VITAL SIGNS: BP (!) 147/84   Pulse 116   Temp 97.7 °F (36.5 °C) (Oral) BUN 13 6 - 23 MG/DL    CREATININE 0.5 (L) 0.6 - 1.1 MG/DL    Glucose 524 (HH) 70 - 99 MG/DL    Calcium 9.4 8.3 - 10.6 MG/DL    Alb 4.6 3.4 - 5.0 GM/DL    Total Protein 9.2 (H) 6.4 - 8.2 GM/DL    Total Bilirubin 0.2 0.0 - 1.0 MG/DL    ALT 7 (L) 10 - 40 U/L    AST 9 (L) 15 - 37 IU/L    Alkaline Phosphatase 88 40 - 129 IU/L    GFR Non-African American >60 >60 mL/min/1.73m2    GFR African American >60 >60 mL/min/1.73m2    Anion Gap 28 (H) 4 - 16   Lipase   Result Value Ref Range    Lipase 14 13 - 60 IU/L   pH, venous   Result Value Ref Range    pH, Michael 6.88 (L) 7.32 - 7.42   Beta-Hydroxybutyrate   Result Value Ref Range    Beta-Hydroxybutyrate >20.8 (H) 0.0 - 3.0 MG/DL   HCG Serum, Qualitative   Result Value Ref Range    hCG Qual NEGATIVE    POCT Glucose   Result Value Ref Range    POC Glucose 471 (H) 70 - 99 MG/DL         RADIOLOGY    XR CHEST PORTABLE   Final Result   No evidence for acute cardiopulmonary process. ED COURSE & MEDICAL DECISION MAKING      Patient presents as above. Patient appears to be in diabetic ketoacidosis. Patient provided IV fluids, Zofran. POC glucose is 471. Chest x-ray shows no acute process. Pregnancy is negative. Lipase is 14. Beta hydroxybutyrate is greater than 20.8. CMP shows CO2 of 4, glucose of 524 and anion gap of 28. BC shows elevation white blood cell count of 13.2, hemoglobin is 17.8 and hematocrit 56. Venous pH is 6.88. Patient is in diabetic ketoacidosis, insulin drip is ordered. Consult with hospitalist who accepts admission. Rapid flu is positive for influenza A and Tamiflu as ordered. CRITICAL CARE NOTE:  There was a high probability of clinically significant life-threatening deterioration of the patient's condition requiring my urgent intervention due to diabetic ketoacidosis. IV fluids, insulin drip, consult was performed to address this. Total critical care time is at least  35 minutes.     This includes vital sign monitoring, pulse oximetry

## 2020-03-13 NOTE — H&P
4.96\" (1.65 m)     Physical Exam: 03/13/20    GEN  -Awake, alert, appearing female, lying in bed, cooperative, able to give adequate history. Appears given age. EYES -Pupils are equally round. No vision changes. No scleral erythema, discharge, or conjunctivitis. HENT -MM are moist. Oral pharynx without exudates, no evidence of thrush. NECK -Supple, no apparent thyromegaly or masses. RESP -tachypnea, LS clear/diminished bilat, no wheezes, rales or rhonchi. Symmetric chest movement. No respiratory distress noted. C/V  -S1/S2 auscultated. RRR, tachycardia without appreciable M/R/G. No JVD or carotid bruits. Peripheral pulses equal bilaterally and palpable. Peripheral pulses equal bilaterally and palpable. No peripheral edema. GI  -Abdomen is soft, round, non-distended, no significant tenderness. No masses or guarding. + BS in all quadrants. Rectal exam deferred.   -Liu catheter is not present. LYMPH  -No palpable cervical lymphadenopathy and no hepatosplenomegaly. No petechiae or ecchymoses. MS  -B/L extremities strong muscles strength. Full movements. No gross joint deformities. No swelling, intact sensation symmetrical.   SKIN  -Normal coloration, warm, dry. No open wounds or ulcers. NEURO  -CN 2-12 appear grossly intact, normal speech, no lateralizing weakness. PSYC  -Awake, alert, oriented x 4. Appropriate affect. Past Medical History:      Past Medical History:   Diagnosis Date    Chronic kidney disease     Chronic kidney disease (CKD) stage G1/A1, glomerular filtration rate (GFR) equal to or greater than 90 mL/min/1.73 square meter and albuminuria creatinine ratio less than 30 mg/g 1/22/2018    Diabetes mellitus ([address]     DKA (diabetic ketoacidoses) ([address]     DKA (diabetic ketoacidoses) ([address]      Past Surgery History:  Patient  has no past surgical history on file.   Social History:    FAM HX: Assessed: family history includes Diabetes in her mother; High Blood Pressure in her mother; PRN Meds: glucose, 15 g, PRN  dextrose, 12.5 g, PRN  glucagon (rDNA), 1 mg, PRN  dextrose, 100 mL/hr, PRN  dextrose, 12.5 g, PRN  potassium chloride, 10 mEq, PRN  magnesium sulfate, 1 g, PRN  sodium phosphate IVPB, 10 mmol, PRN    Or  sodium phosphate IVPB, 15 mmol, PRN    Or  sodium phosphate IVPB, 20 mmol, PRN  dextrose 5 % and 0.45 % NaCl, , Continuous PRN      Prior to Admission Meds:  Prior to Admission medications    Medication Sig Start Date End Date Taking? Authorizing Provider   insulin glargine (LANTUS) 100 UNIT/ML injection vial Inject 45 Units into the skin nightly   Yes Historical Provider, MD   insulin aspart (NOVOLOG) 100 UNIT/ML injection vial Inject into the skin See Admin Instructions 20 units with breakfast and lunch and 25 units with dinner and at bedtime   Yes Historical Provider, MD   losartan (COZAAR) 25 MG tablet Take 1 tablet by mouth daily 12/3/19   Kilo Feldman MD     Data:     Laboratory this visit:  Reviewed  Recent Labs     03/13/20  1325   WBC 13.2*   HGB 17.8*   HCT 56.0*         Recent Labs     03/13/20  1325   *   K 5.1   CL 99   CO2 4*   BUN 13   CREATININE 0.5*     Recent Labs     03/13/20  1325   AST 9*   ALT 7*   BILITOT 0.2   ALKPHOS 88     No results for input(s): INR in the last 72 hours. No results for input(s): CKTOTAL, CKMB, CKMBINDEX in the last 72 hours. Invalid input(s): Farrukh Reap input(s): PRO-BNP    Radiology this visit:  Reviewed. Xr Chest Portable    Result Date: 3/13/2020  EXAMINATION: ONE XRAY VIEW OF THE CHEST 3/13/2020 1:53 pm COMPARISON: 12/28/2017. HISTORY: ORDERING SYSTEM PROVIDED HISTORY: cough TECHNOLOGIST PROVIDED HISTORY: Reason for exam:->cough Reason for Exam: cough Acuity: Acute Type of Exam: Initial FINDINGS: Lungs are clear. Cardiac and mediastinal silhouettes are within normal limits. No pneumothoraces. Bony structures appear intact. No evidence for acute cardiopulmonary process.      EKG this visit:

## 2020-03-14 LAB
ANION GAP SERPL CALCULATED.3IONS-SCNC: 12 MMOL/L (ref 4–16)
ANION GAP SERPL CALCULATED.3IONS-SCNC: 8 MMOL/L (ref 4–16)
ANION GAP SERPL CALCULATED.3IONS-SCNC: 8 MMOL/L (ref 4–16)
BASOPHILS ABSOLUTE: 0 K/CU MM
BASOPHILS RELATIVE PERCENT: 0.1 % (ref 0–1)
BUN BLDV-MCNC: 11 MG/DL (ref 6–23)
BUN BLDV-MCNC: 13 MG/DL (ref 6–23)
BUN BLDV-MCNC: 13 MG/DL (ref 6–23)
CALCIUM SERPL-MCNC: 7.9 MG/DL (ref 8.3–10.6)
CALCIUM SERPL-MCNC: 8.3 MG/DL (ref 8.3–10.6)
CALCIUM SERPL-MCNC: 8.4 MG/DL (ref 8.3–10.6)
CHLORIDE BLD-SCNC: 108 MMOL/L (ref 99–110)
CHLORIDE BLD-SCNC: 110 MMOL/L (ref 99–110)
CHLORIDE BLD-SCNC: 110 MMOL/L (ref 99–110)
CO2: 11 MMOL/L (ref 21–32)
CO2: 11 MMOL/L (ref 21–32)
CO2: 12 MMOL/L (ref 21–32)
CREAT SERPL-MCNC: 0.4 MG/DL (ref 0.6–1.1)
CREAT SERPL-MCNC: 0.5 MG/DL (ref 0.6–1.1)
CREAT SERPL-MCNC: 0.5 MG/DL (ref 0.6–1.1)
DIFFERENTIAL TYPE: ABNORMAL
EOSINOPHILS ABSOLUTE: 0 K/CU MM
EOSINOPHILS RELATIVE PERCENT: 0 % (ref 0–3)
GFR AFRICAN AMERICAN: >60 ML/MIN/1.73M2
GFR NON-AFRICAN AMERICAN: >60 ML/MIN/1.73M2
GLUCOSE BLD-MCNC: 117 MG/DL (ref 70–99)
GLUCOSE BLD-MCNC: 145 MG/DL (ref 70–99)
GLUCOSE BLD-MCNC: 157 MG/DL (ref 70–99)
GLUCOSE BLD-MCNC: 164 MG/DL (ref 70–99)
GLUCOSE BLD-MCNC: 246 MG/DL (ref 70–99)
GLUCOSE BLD-MCNC: 248 MG/DL (ref 70–99)
GLUCOSE BLD-MCNC: 249 MG/DL (ref 70–99)
GLUCOSE BLD-MCNC: 286 MG/DL (ref 70–99)
GLUCOSE BLD-MCNC: 299 MG/DL (ref 70–99)
GLUCOSE BLD-MCNC: 303 MG/DL (ref 70–99)
GLUCOSE BLD-MCNC: 316 MG/DL (ref 70–99)
GLUCOSE BLD-MCNC: 342 MG/DL (ref 70–99)
HCT VFR BLD CALC: 40.9 % (ref 37–47)
HEMOGLOBIN: 13.8 GM/DL (ref 12.5–16)
IMMATURE NEUTROPHIL %: 0.9 % (ref 0–0.43)
LACTIC ACID, SEPSIS: 1.3 MMOL/L (ref 0.5–1.9)
LYMPHOCYTES ABSOLUTE: 1.8 K/CU MM
LYMPHOCYTES RELATIVE PERCENT: 11.1 % (ref 24–44)
MAGNESIUM: 1.8 MG/DL (ref 1.8–2.4)
MCH RBC QN AUTO: 29.7 PG (ref 27–31)
MCHC RBC AUTO-ENTMCNC: 33.7 % (ref 32–36)
MCV RBC AUTO: 88 FL (ref 78–100)
MONOCYTES ABSOLUTE: 1.2 K/CU MM
MONOCYTES RELATIVE PERCENT: 7.1 % (ref 0–4)
NUCLEATED RBC %: 0 %
PDW BLD-RTO: 11.5 % (ref 11.7–14.9)
PHOSPHORUS: 1.7 MG/DL (ref 2.5–4.9)
PLATELET # BLD: 284 K/CU MM (ref 140–440)
PMV BLD AUTO: 9.7 FL (ref 7.5–11.1)
POTASSIUM SERPL-SCNC: 3.8 MMOL/L (ref 3.5–5.1)
POTASSIUM SERPL-SCNC: 3.9 MMOL/L (ref 3.5–5.1)
POTASSIUM SERPL-SCNC: 4.1 MMOL/L (ref 3.5–5.1)
RBC # BLD: 4.65 M/CU MM (ref 4.2–5.4)
SEGMENTED NEUTROPHILS ABSOLUTE COUNT: 13.3 K/CU MM
SEGMENTED NEUTROPHILS RELATIVE PERCENT: 80.8 % (ref 36–66)
SODIUM BLD-SCNC: 129 MMOL/L (ref 135–145)
SODIUM BLD-SCNC: 130 MMOL/L (ref 135–145)
SODIUM BLD-SCNC: 131 MMOL/L (ref 135–145)
TOTAL IMMATURE NEUTOROPHIL: 0.14 K/CU MM
TOTAL NUCLEATED RBC: 0 K/CU MM
TOTAL RETICULOCYTE COUNT: 0.08 K/CU MM
WBC # BLD: 16.4 K/CU MM (ref 4–10.5)

## 2020-03-14 PROCEDURE — 83605 ASSAY OF LACTIC ACID: CPT

## 2020-03-14 PROCEDURE — 6360000002 HC RX W HCPCS: Performed by: NURSE PRACTITIONER

## 2020-03-14 PROCEDURE — 84100 ASSAY OF PHOSPHORUS: CPT

## 2020-03-14 PROCEDURE — 94761 N-INVAS EAR/PLS OXIMETRY MLT: CPT

## 2020-03-14 PROCEDURE — 83735 ASSAY OF MAGNESIUM: CPT

## 2020-03-14 PROCEDURE — C9113 INJ PANTOPRAZOLE SODIUM, VIA: HCPCS | Performed by: NURSE PRACTITIONER

## 2020-03-14 PROCEDURE — 6370000000 HC RX 637 (ALT 250 FOR IP): Performed by: INTERNAL MEDICINE

## 2020-03-14 PROCEDURE — 85025 COMPLETE CBC W/AUTO DIFF WBC: CPT

## 2020-03-14 PROCEDURE — 80048 BASIC METABOLIC PNL TOTAL CA: CPT

## 2020-03-14 PROCEDURE — 2580000003 HC RX 258: Performed by: INTERNAL MEDICINE

## 2020-03-14 PROCEDURE — 2000000000 HC ICU R&B

## 2020-03-14 PROCEDURE — 6370000000 HC RX 637 (ALT 250 FOR IP): Performed by: NURSE PRACTITIONER

## 2020-03-14 PROCEDURE — 82962 GLUCOSE BLOOD TEST: CPT

## 2020-03-14 PROCEDURE — 2580000003 HC RX 258: Performed by: NURSE PRACTITIONER

## 2020-03-14 RX ORDER — INSULIN GLARGINE 100 [IU]/ML
50 INJECTION, SOLUTION SUBCUTANEOUS NIGHTLY
Status: DISCONTINUED | OUTPATIENT
Start: 2020-03-14 | End: 2020-03-16 | Stop reason: HOSPADM

## 2020-03-14 RX ORDER — SODIUM CHLORIDE 9 MG/ML
INJECTION, SOLUTION INTRAVENOUS CONTINUOUS
Status: DISCONTINUED | OUTPATIENT
Start: 2020-03-14 | End: 2020-03-16 | Stop reason: HOSPADM

## 2020-03-14 RX ORDER — ONDANSETRON 2 MG/ML
4 INJECTION INTRAMUSCULAR; INTRAVENOUS EVERY 6 HOURS PRN
Status: DISCONTINUED | OUTPATIENT
Start: 2020-03-14 | End: 2020-03-16 | Stop reason: HOSPADM

## 2020-03-14 RX ADMIN — SODIUM CHLORIDE: 9 INJECTION, SOLUTION INTRAVENOUS at 21:21

## 2020-03-14 RX ADMIN — INSULIN LISPRO 20 UNITS: 100 INJECTION, SOLUTION INTRAVENOUS; SUBCUTANEOUS at 09:34

## 2020-03-14 RX ADMIN — OSELTAMIVIR PHOSPHATE 75 MG: 6 FOR SUSPENSION ORAL at 21:12

## 2020-03-14 RX ADMIN — ENOXAPARIN SODIUM 40 MG: 40 INJECTION SUBCUTANEOUS at 08:23

## 2020-03-14 RX ADMIN — GUAIFENESIN 600 MG: 600 TABLET, EXTENDED RELEASE ORAL at 08:23

## 2020-03-14 RX ADMIN — PREGABALIN 100 MG: 100 CAPSULE ORAL at 08:23

## 2020-03-14 RX ADMIN — OSELTAMIVIR PHOSPHATE 75 MG: 6 FOR SUSPENSION ORAL at 09:32

## 2020-03-14 RX ADMIN — INSULIN LISPRO 20 UNITS: 100 INJECTION, SOLUTION INTRAVENOUS; SUBCUTANEOUS at 13:08

## 2020-03-14 RX ADMIN — PANTOPRAZOLE SODIUM 40 MG: 40 INJECTION, POWDER, FOR SOLUTION INTRAVENOUS at 08:22

## 2020-03-14 RX ADMIN — GUAIFENESIN 600 MG: 600 TABLET, EXTENDED RELEASE ORAL at 21:12

## 2020-03-14 RX ADMIN — DEXTROSE AND SODIUM CHLORIDE: 5; 450 INJECTION, SOLUTION INTRAVENOUS at 06:05

## 2020-03-14 RX ADMIN — LOSARTAN POTASSIUM 25 MG: 25 TABLET, FILM COATED ORAL at 08:23

## 2020-03-14 RX ADMIN — INSULIN LISPRO 20 UNITS: 100 INJECTION, SOLUTION INTRAVENOUS; SUBCUTANEOUS at 18:09

## 2020-03-14 RX ADMIN — ONDANSETRON 4 MG: 2 INJECTION INTRAMUSCULAR; INTRAVENOUS at 00:40

## 2020-03-14 RX ADMIN — PREGABALIN 100 MG: 100 CAPSULE ORAL at 21:12

## 2020-03-14 RX ADMIN — SODIUM CHLORIDE: 9 INJECTION, SOLUTION INTRAVENOUS at 11:58

## 2020-03-14 ASSESSMENT — PAIN SCALES - GENERAL
PAINLEVEL_OUTOF10: 0

## 2020-03-14 NOTE — PROGRESS NOTES
Hospitalist Progress Note         Admit Date: 3/13/2020    PCP: Shelby Walton MD     Chief Complaint   Patient presents with    Hyperglycemia     Took her Novolog 20 units this morning around 8 am        Assessment and Plan:      DKA, type 1, not at goal [facility]) improved on insulin drip. Started on subcu insulin as per Endo   Influenza A on Tamiflu. Continue droplet precautions   Leukocytosis most likely from DKA.   No evidence of pneumonia    VTE prophylaxis LMWH    Current Facility-Administered Medications   Medication Dose Route Frequency Provider Last Rate Last Dose    ondansetron (ZOFRAN) injection 4 mg  4 mg Intravenous Q6H PRN JOAN Palomino CNP   4 mg at 03/14/20 0040    insulin lispro (HUMALOG) injection vial 20 Units  20 Units Subcutaneous TID  Shelby Walton MD   20 Units at 03/14/20 0934    insulin glargine (LANTUS) injection vial 50 Units  50 Units Subcutaneous Nightly Shelby Walton MD        insulin NPH (HUMULIN N;NOVOLIN N) injection vial 10 Units  10 Units Subcutaneous Once Shelby Walton MD        insulin lispro (HUMALOG) injection vial 0-12 Units  0-12 Units Subcutaneous TID  Shelby Walton MD   4 Units at 03/14/20 0930    insulin lispro (HUMALOG) injection vial 0-6 Units  0-6 Units Subcutaneous 2 times per day Shelby Walton MD        0.9 % sodium chloride infusion   Intravenous Continuous Shelby Walton  mL/hr at 03/14/20 1158      glucose (GLUTOSE) 40 % oral gel 15 g  15 g Oral PRN Maciej Vazquez PA-C        dextrose 50 % IV solution  12.5 g Intravenous PRN Maciej Vazquez PA-C        glucagon (rDNA) injection 1 mg  1 mg Intramuscular PRN Maciej Vazquez PA-C        dextrose 5 % solution  100 mL/hr Intravenous PRN Maciej Vazquez PA-C        enoxaparin (LOVENOX) injection 40 mg  40 mg Subcutaneous Daily JOAN Lee CNP   40 mg at 03/14/20 0823    dextrose 50 % IV solution  12.5 g Intravenous PRN Erica Aleksandr, APRN - CNP        potassium chloride 10 mEq/100 mL IVPB (Peripheral Line)  10 mEq Intravenous PRN Everardo Lowry, APRN - CNP        magnesium sulfate 1 g in dextrose 5% 100 mL IVPB  1 g Intravenous PRN Everardo Lowry, APRN - CNP        sodium phosphate 10 mmol in dextrose 5 % 250 mL IVPB  10 mmol Intravenous PRN Everardo Lowry, APRN - CNP        Or    sodium phosphate 15 mmol in dextrose 5 % 250 mL IVPB  15 mmol Intravenous PRN Everardo Lowry, APRN - CNP        Or    sodium phosphate 20 mmol in dextrose 5 % 500 mL IVPB  20 mmol Intravenous PRN Everardo Alonsoier, APRN - CNP        oseltamivir 6mg/ml (TAMIFLU) suspension 75 mg  75 mg Oral BID Erica Brandon, APRN - CNP   75 mg at 03/14/20 0932    losartan (COZAAR) tablet 25 mg  25 mg Oral Daily Erica Brandon, APRN - CNP   25 mg at 03/14/20 0823    pantoprazole (PROTONIX) injection 40 mg  40 mg Intravenous Daily Erica Brandon, APRN - CNP   40 mg at 03/14/20 4005    guaiFENesin (MUCINEX) extended release tablet 600 mg  600 mg Oral BID Everardo Lowry, APRN - CNP   600 mg at 03/14/20 0823    pregabalin (LYRICA) capsule 100 mg  100 mg Oral BID Kvng Taylor MD   100 mg at 03/14/20 4993       Subjective:     Patient denied any new complaints. No acute overnight events    Objective:        Intake/Output Summary (Last 24 hours) at 3/14/2020 1235  Last data filed at 3/14/2020 0608  Gross per 24 hour   Intake 5877.05 ml   Output --   Net 5877.05 ml      Vitals:   Vitals:    03/14/20 1000   BP: (!) 90/51   Pulse: 107   Resp: 19   Temp:    SpO2:      Physical Exam:  General Appearance:    Alert, cooperative, no distress  Head:      Normocephalic, without obvious abnormality, atraumatic  Eyes:       Conjunctiva/corneas clear, EOM's intact  Lungs:    Clear to auscultation bilaterally, respirations unlabored  Heart:                Regular rate and rhythm, S1 and S2 normal, no murmur,   rub or gallop  Abdomen:     Soft, non-tender, bowel sounds active, no masses, no organomegaly  Extremities:   Extremities normal, atraumatic, no cyanosis or edema  Neurological:   Grossly Intact. Significant Diagnostic Studies:   DATA:    CBC   Recent Labs     03/13/20  1325 03/14/20  0415   WBC 13.2* 16.4*   HGB 17.8* 13.8   HCT 56.0* 40.9    284      BMP   Recent Labs     03/13/20  2355 03/14/20  0415 03/14/20  0756   * 130* 129*   K 3.9 3.8 4.1    110 110   CO2 11* 12* 11*   PHOS  --  1.7*  --    BUN 11 13 13   CREATININE 0.5* 0.5* 0.4*     LFT'S   Recent Labs     03/13/20  1325   AST 9*   ALT 7*   BILITOT 0.2   ALKPHOS 88     COAG No results for input(s): INR in the last 72 hours. POC:   Lab Results   Component Value Date    POCGLU 246 03/14/2020    POCGLU 316 03/14/2020    POCGLU 299 03/14/2020    POCGLU 249 03/14/2020     [id number]:  Lab Results   Component Value Date    LABA1C 15.1 09/04/2019     CARDIAC ENZYMES  No results for input(s): CKTOTAL, CKMB, CKMBINDEX, TROPONINI in the last 72 hours. Troponin: No results for input(s): TROPONINT in the last 72 hours. BNP: No results for input(s): PROBNP in the last 72 hours. U/A:    Lab Results   Component Value Date    COLORU STRAW 03/13/2020    WBCUA <1 03/13/2020    RBCUA <1 03/13/2020    MUCUS RARE 03/13/2020    BACTERIA NEGATIVE 03/13/2020    CLARITYU CLEAR 03/13/2020    SPECGRAV 1.021 03/13/2020    LEUKOCYTESUR NEGATIVE 03/13/2020    BLOODU NEGATIVE 03/13/2020    GLUCOSEU >=1000 03/11/2019    AMORPHOUS RARE 12/28/2017       Xr Chest Portable    Result Date: 3/13/2020  EXAMINATION: ONE XRAY VIEW OF THE CHEST 3/13/2020 1:53 pm COMPARISON: 12/28/2017. HISTORY: ORDERING SYSTEM PROVIDED HISTORY: cough TECHNOLOGIST PROVIDED HISTORY: Reason for exam:->cough Reason for Exam: cough Acuity: Acute Type of Exam: Initial FINDINGS: Lungs are clear. Cardiac and mediastinal silhouettes are within normal limits. No pneumothoraces. Bony structures appear intact. No evidence for acute cardiopulmonary process. [facility]ist

## 2020-03-15 LAB
ANION GAP SERPL CALCULATED.3IONS-SCNC: 9 MMOL/L (ref 4–16)
BUN BLDV-MCNC: 9 MG/DL (ref 6–23)
CALCIUM SERPL-MCNC: 8.5 MG/DL (ref 8.3–10.6)
CHLORIDE BLD-SCNC: 112 MMOL/L (ref 99–110)
CO2: 14 MMOL/L (ref 21–32)
CREAT SERPL-MCNC: 0.4 MG/DL (ref 0.6–1.1)
CULTURE: NORMAL
GFR AFRICAN AMERICAN: >60 ML/MIN/1.73M2
GFR NON-AFRICAN AMERICAN: >60 ML/MIN/1.73M2
GLUCOSE BLD-MCNC: 190 MG/DL (ref 70–99)
GLUCOSE BLD-MCNC: 198 MG/DL (ref 70–99)
GLUCOSE BLD-MCNC: 204 MG/DL (ref 70–99)
GLUCOSE BLD-MCNC: 245 MG/DL (ref 70–99)
GLUCOSE BLD-MCNC: 319 MG/DL (ref 70–99)
GLUCOSE BLD-MCNC: 343 MG/DL (ref 70–99)
Lab: NORMAL
POTASSIUM SERPL-SCNC: 3.9 MMOL/L (ref 3.5–5.1)
SODIUM BLD-SCNC: 135 MMOL/L (ref 135–145)
SPECIMEN: NORMAL

## 2020-03-15 PROCEDURE — 82962 GLUCOSE BLOOD TEST: CPT

## 2020-03-15 PROCEDURE — 6370000000 HC RX 637 (ALT 250 FOR IP): Performed by: NURSE PRACTITIONER

## 2020-03-15 PROCEDURE — 2580000003 HC RX 258: Performed by: INTERNAL MEDICINE

## 2020-03-15 PROCEDURE — 6360000002 HC RX W HCPCS: Performed by: NURSE PRACTITIONER

## 2020-03-15 PROCEDURE — 6370000000 HC RX 637 (ALT 250 FOR IP): Performed by: INTERNAL MEDICINE

## 2020-03-15 PROCEDURE — C9113 INJ PANTOPRAZOLE SODIUM, VIA: HCPCS | Performed by: NURSE PRACTITIONER

## 2020-03-15 PROCEDURE — 1200000000 HC SEMI PRIVATE

## 2020-03-15 PROCEDURE — 85027 COMPLETE CBC AUTOMATED: CPT

## 2020-03-15 PROCEDURE — 80048 BASIC METABOLIC PNL TOTAL CA: CPT

## 2020-03-15 RX ORDER — 0.9 % SODIUM CHLORIDE 0.9 %
1000 INTRAVENOUS SOLUTION INTRAVENOUS ONCE
Status: COMPLETED | OUTPATIENT
Start: 2020-03-15 | End: 2020-03-15

## 2020-03-15 RX ADMIN — PREGABALIN 100 MG: 100 CAPSULE ORAL at 08:22

## 2020-03-15 RX ADMIN — SODIUM CHLORIDE: 9 INJECTION, SOLUTION INTRAVENOUS at 06:22

## 2020-03-15 RX ADMIN — INSULIN LISPRO 20 UNITS: 100 INJECTION, SOLUTION INTRAVENOUS; SUBCUTANEOUS at 09:08

## 2020-03-15 RX ADMIN — GUAIFENESIN 600 MG: 600 TABLET, EXTENDED RELEASE ORAL at 20:52

## 2020-03-15 RX ADMIN — INSULIN LISPRO 15 UNITS: 100 INJECTION, SOLUTION INTRAVENOUS; SUBCUTANEOUS at 17:53

## 2020-03-15 RX ADMIN — LOSARTAN POTASSIUM 25 MG: 25 TABLET, FILM COATED ORAL at 08:22

## 2020-03-15 RX ADMIN — GUAIFENESIN 600 MG: 600 TABLET, EXTENDED RELEASE ORAL at 08:22

## 2020-03-15 RX ADMIN — ENOXAPARIN SODIUM 40 MG: 40 INJECTION SUBCUTANEOUS at 08:21

## 2020-03-15 RX ADMIN — SODIUM CHLORIDE 1000 ML: 9 INJECTION, SOLUTION INTRAVENOUS at 08:22

## 2020-03-15 RX ADMIN — PANTOPRAZOLE SODIUM 40 MG: 40 INJECTION, POWDER, FOR SOLUTION INTRAVENOUS at 08:21

## 2020-03-15 RX ADMIN — INSULIN GLARGINE 50 UNITS: 100 INJECTION, SOLUTION SUBCUTANEOUS at 20:52

## 2020-03-15 RX ADMIN — OSELTAMIVIR PHOSPHATE 75 MG: 6 FOR SUSPENSION ORAL at 21:56

## 2020-03-15 RX ADMIN — PREGABALIN 100 MG: 100 CAPSULE ORAL at 20:52

## 2020-03-15 RX ADMIN — ONDANSETRON 4 MG: 2 INJECTION INTRAMUSCULAR; INTRAVENOUS at 09:19

## 2020-03-15 RX ADMIN — OSELTAMIVIR PHOSPHATE 75 MG: 6 FOR SUSPENSION ORAL at 08:30

## 2020-03-15 ASSESSMENT — PAIN SCALES - GENERAL
PAINLEVEL_OUTOF10: 0
PAINLEVEL_OUTOF10: 0

## 2020-03-15 NOTE — PROGRESS NOTES
Progress Note( Dr. Alysia Rousseau)  3/15/2020  Subjective:   Admit Date: 3/13/2020  PCP: Dion Fontaine MD    Admitted For : Vomiting and not feeling well and found to be in DKA    Consulted For: Better control of blood glucose    Interval History: He is much better and has influenza A pneumonia    Denies any chest pains,   Mild SOB . Denies nausea or vomiting one time this morning  No new bowel or bladder symptoms. Intake/Output Summary (Last 24 hours) at 3/15/2020 1615  Last data filed at 3/15/2020 1300  Gross per 24 hour   Intake 4229.73 ml   Output --   Net 4229.73 ml       DATA    CBC:   Recent Labs     03/13/20  1325 03/14/20  0415   WBC 13.2* 16.4*   HGB 17.8* 13.8    284    CMP:  Recent Labs     03/13/20  1325  03/14/20  0415 03/14/20  0756 03/15/20  0500   *   < > 130* 129* 135   K 5.1   < > 3.8 4.1 3.9   CL 99   < > 110 110 112*   CO2 4*   < > 12* 11* 14*   BUN 13   < > 13 13 9   CREATININE 0.5*   < > 0.5* 0.4* 0.4*   CALCIUM 9.4   < > 8.3 7.9* 8.5   PROT 9.2*  --   --   --   --    LABALBU 4.6  --   --   --   --    BILITOT 0.2  --   --   --   --    ALKPHOS 88  --   --   --   --    AST 9*  --   --   --   --    ALT 7*  --   --   --   --     < > = values in this interval not displayed. Lipids:   Lab Results   Component Value Date    CHOL 205 12/11/2017    HDL 40 09/04/2019    TRIG 164 12/11/2017     Glucose:  Recent Labs     03/15/20  0222 03/15/20  0816 03/15/20  1234   POCGLU 190* 343* 204*     LmyelxkaceT5O:  Lab Results   Component Value Date    LABA1C 15.1 09/04/2019     High Sensitivity TSH: No results found for: TSHHS  Free T3: No results found for: FT3  Free T4:No results found for: T4FREE    Xr Chest Portable    Result Date: 3/13/2020  EXAMINATION: ONE XRAY VIEW OF THE CHEST 3/13/2020 1:53 pm COMPARISON: 12/28/2017.  HISTORY: ORDERING SYSTEM PROVIDED HISTORY: cough TECHNOLOGIST PROVIDED HISTORY: Reason for exam:->cough Reason for Exam: cough Acuity: Acute Type of Exam: Initial FINDINGS: Lungs are clear. Cardiac and mediastinal silhouettes are within normal limits. No pneumothoraces. Bony structures appear intact. No evidence for acute cardiopulmonary process. Scheduled Medicines   Medications:    insulin lispro  15 Units Subcutaneous TID     insulin glargine  50 Units Subcutaneous Nightly    insulin lispro  0-12 Units Subcutaneous TID     insulin lispro  0-6 Units Subcutaneous 2 times per day    enoxaparin  40 mg Subcutaneous Daily    oseltamivir 6mg/ml  75 mg Oral BID    losartan  25 mg Oral Daily    pantoprazole  40 mg Intravenous Daily    guaiFENesin  600 mg Oral BID    pregabalin  100 mg Oral BID      Infusions:    sodium chloride 100 mL/hr at 03/15/20 0622    dextrose           Objective:   Vitals: /70   Pulse 109   Temp 97.9 °F (36.6 °C)   Resp (!) 38   Ht 5' 4.96\" (1.65 m)   Wt 107 lb 12.9 oz (48.9 kg)   LMP 02/13/2020 (Approximate)   SpO2 99%   BMI 17.96 kg/m²   General appearance: alert and cooperative with exam  Neck: no JVD or bruit  Thyroid : Normal lobes   Lungs: Has Vesicular Breath sounds   Heart:  regular rate and rhythm  Abdomen: soft, non-tender; bowel sounds normal; no masses,  no organomegaly  Musculoskeletal: Normal  Extremities: extremities normal, , no edema  Neurologic:  Awake, alert, oriented to name, place and time. Cranial nerves II-XII are grossly intact. Motor is  intact. Sensory is intact. ,  and gait is normal.    Assessment:     Patient Active Problem List:     DKA (diabetic ketoacidoses) ([facility] [address]     Diabetic ketoacidosis ([facility]     DKA, type 1, not at goal [facility])     DM (diabetes mellitus) ([facility]     Proteinuria     Chronic kidney disease (CKD) stage G1/A1, glomerular filtration rate (GFR) equal to or greater than 90 mL/min/1.73 square meter and albuminuria creatinine ratio less than 30 mg/g     Influenza A      Plan:     1. Reviewed POC blood glucose . Labs and X ray results   2. Reviewed Current Medicines  3.  On

## 2020-03-15 NOTE — PROGRESS NOTES
potassium chloride 10 mEq/100 mL IVPB (Peripheral Line)  10 mEq Intravenous PRN Theola Peace, APRN - CNP        sodium phosphate 10 mmol in dextrose 5 % 250 mL IVPB  10 mmol Intravenous PRN Theola Peace, APRN - CNP        oseltamivir 6mg/ml (TAMIFLU) suspension 75 mg  75 mg Oral BID Theola Peace, APRN - CNP   75 mg at 03/15/20 0830    losartan (COZAAR) tablet 25 mg  25 mg Oral Daily Erica Brandon, APRN - CNP   25 mg at 03/15/20 6281    pantoprazole (PROTONIX) injection 40 mg  40 mg Intravenous Daily Erica Brandon, APRN - CNP   40 mg at 03/15/20 0821    guaiFENesin (MUCINEX) extended release tablet 600 mg  600 mg Oral BID Theola Peace, APRN - CNP   600 mg at 03/15/20 2830    pregabalin (LYRICA) capsule 100 mg  100 mg Oral BID Wandy Lewis MD   100 mg at 03/15/20 2591       Subjective:     Patient denied any new complaints. Patient had thrown up once today a.m. Objective: Intake/Output Summary (Last 24 hours) at 3/15/2020 1210  Last data filed at 3/15/2020 1105  Gross per 24 hour   Intake 3533.73 ml   Output --   Net 3533.73 ml      Vitals:   Vitals:    03/15/20 1105   BP: 117/77   Pulse: 104   Resp: 19   Temp:    SpO2: 99%     Physical Exam:  General Appearance:    Alert, cooperative, no distress  Head:      Normocephalic, without obvious abnormality, atraumatic  Eyes:       Conjunctiva/corneas clear, EOM's intact  Lungs:    Clear to auscultation bilaterally, respirations unlabored  Heart:                Tachycardia with regular rate and rhythm, S1 and S2 normal, no murmur,   rub or gallop  Abdomen:     Soft, non-tender, bowel sounds active, no masses, no organomegaly  Extremities:   Extremities normal, atraumatic, no cyanosis or edema  Neurological:   Grossly Intact.     Significant Diagnostic Studies:   DATA:    CBC   Recent Labs     03/13/20  1325 03/14/20  0415   WBC 13.2* 16.4*   HGB 17.8* 13.8   HCT 56.0* 40.9    284      BMP   Recent Labs     03/14/20  0415 03/14/20  0756

## 2020-03-16 VITALS
TEMPERATURE: 97.9 F | HEART RATE: 96 BPM | RESPIRATION RATE: 16 BRPM | DIASTOLIC BLOOD PRESSURE: 62 MMHG | WEIGHT: 129 LBS | OXYGEN SATURATION: 100 % | SYSTOLIC BLOOD PRESSURE: 120 MMHG | HEIGHT: 65 IN | BODY MASS INDEX: 21.49 KG/M2

## 2020-03-16 LAB
ANION GAP SERPL CALCULATED.3IONS-SCNC: 9 MMOL/L (ref 4–16)
BUN BLDV-MCNC: 6 MG/DL (ref 6–23)
CALCIUM SERPL-MCNC: 7.8 MG/DL (ref 8.3–10.6)
CHLORIDE BLD-SCNC: 112 MMOL/L (ref 99–110)
CO2: 18 MMOL/L (ref 21–32)
CREAT SERPL-MCNC: 0.4 MG/DL (ref 0.6–1.1)
GFR AFRICAN AMERICAN: >60 ML/MIN/1.73M2
GFR NON-AFRICAN AMERICAN: >60 ML/MIN/1.73M2
GLUCOSE BLD-MCNC: 134 MG/DL (ref 70–99)
GLUCOSE BLD-MCNC: 177 MG/DL (ref 70–99)
GLUCOSE BLD-MCNC: 231 MG/DL (ref 70–99)
GLUCOSE BLD-MCNC: 241 MG/DL (ref 70–99)
GLUCOSE BLD-MCNC: 271 MG/DL (ref 70–99)
HCT VFR BLD CALC: 42.8 % (ref 37–47)
HEMOGLOBIN: 13.4 GM/DL (ref 12.5–16)
MAGNESIUM: 1.9 MG/DL (ref 1.8–2.4)
MCH RBC QN AUTO: 29.9 PG (ref 27–31)
MCHC RBC AUTO-ENTMCNC: 31.3 % (ref 32–36)
MCV RBC AUTO: 95.5 FL (ref 78–100)
PDW BLD-RTO: 11.9 % (ref 11.7–14.9)
PLATELET # BLD: 250 K/CU MM (ref 140–440)
PMV BLD AUTO: 10.1 FL (ref 7.5–11.1)
POTASSIUM SERPL-SCNC: 3.8 MMOL/L (ref 3.5–5.1)
RBC # BLD: 4.48 M/CU MM (ref 4.2–5.4)
SODIUM BLD-SCNC: 139 MMOL/L (ref 135–145)
WBC # BLD: 8.6 K/CU MM (ref 4–10.5)

## 2020-03-16 PROCEDURE — 2580000003 HC RX 258: Performed by: INTERNAL MEDICINE

## 2020-03-16 PROCEDURE — 6370000000 HC RX 637 (ALT 250 FOR IP): Performed by: INTERNAL MEDICINE

## 2020-03-16 PROCEDURE — 83735 ASSAY OF MAGNESIUM: CPT

## 2020-03-16 PROCEDURE — 85027 COMPLETE CBC AUTOMATED: CPT

## 2020-03-16 PROCEDURE — 80048 BASIC METABOLIC PNL TOTAL CA: CPT

## 2020-03-16 PROCEDURE — 82962 GLUCOSE BLOOD TEST: CPT

## 2020-03-16 PROCEDURE — 6370000000 HC RX 637 (ALT 250 FOR IP): Performed by: NURSE PRACTITIONER

## 2020-03-16 PROCEDURE — 6360000002 HC RX W HCPCS: Performed by: NURSE PRACTITIONER

## 2020-03-16 PROCEDURE — C9113 INJ PANTOPRAZOLE SODIUM, VIA: HCPCS | Performed by: NURSE PRACTITIONER

## 2020-03-16 RX ORDER — GUAIFENESIN 600 MG/1
600 TABLET, EXTENDED RELEASE ORAL 2 TIMES DAILY
Qty: 20 TABLET | Refills: 0 | Status: SHIPPED | OUTPATIENT
Start: 2020-03-16 | End: 2020-03-26

## 2020-03-16 RX ORDER — ONDANSETRON 4 MG/1
4 TABLET, ORALLY DISINTEGRATING ORAL 3 TIMES DAILY PRN
Qty: 21 TABLET | Refills: 0 | Status: SHIPPED | OUTPATIENT
Start: 2020-03-16 | End: 2020-04-20 | Stop reason: ALTCHOICE

## 2020-03-16 RX ORDER — INSULIN GLARGINE 100 [IU]/ML
50 INJECTION, SOLUTION SUBCUTANEOUS NIGHTLY
Qty: 1 VIAL | Refills: 3 | Status: ON HOLD | OUTPATIENT
Start: 2020-03-16 | End: 2020-04-24 | Stop reason: HOSPADM

## 2020-03-16 RX ORDER — OSELTAMIVIR PHOSPHATE 75 MG/1
75 CAPSULE ORAL 2 TIMES DAILY
Qty: 4 CAPSULE | Refills: 0 | Status: SHIPPED | OUTPATIENT
Start: 2020-03-16 | End: 2020-03-18

## 2020-03-16 RX ADMIN — SODIUM CHLORIDE: 9 INJECTION, SOLUTION INTRAVENOUS at 05:16

## 2020-03-16 RX ADMIN — LOSARTAN POTASSIUM 25 MG: 25 TABLET, FILM COATED ORAL at 11:01

## 2020-03-16 RX ADMIN — PANTOPRAZOLE SODIUM 40 MG: 40 INJECTION, POWDER, FOR SOLUTION INTRAVENOUS at 11:02

## 2020-03-16 RX ADMIN — PREGABALIN 100 MG: 100 CAPSULE ORAL at 11:01

## 2020-03-16 RX ADMIN — ENOXAPARIN SODIUM 40 MG: 40 INJECTION SUBCUTANEOUS at 11:17

## 2020-03-16 RX ADMIN — INSULIN LISPRO 20 UNITS: 100 INJECTION, SOLUTION INTRAVENOUS; SUBCUTANEOUS at 18:19

## 2020-03-16 RX ADMIN — OSELTAMIVIR PHOSPHATE 75 MG: 6 FOR SUSPENSION ORAL at 13:03

## 2020-03-16 RX ADMIN — GUAIFENESIN 600 MG: 600 TABLET, EXTENDED RELEASE ORAL at 11:01

## 2020-03-16 NOTE — PLAN OF CARE
Problem: Pain:  Goal: Pain level will decrease  Description: Pain level will decrease  3/14/2020 0136 by Bell Mosley  Outcome: Ongoing  3/13/2020 1648 by Bernardo Vásquez RN  Outcome: Ongoing  Goal: Control of acute pain  Description: Control of acute pain  3/14/2020 0136 by Bell Mosley  Outcome: Ongoing  3/13/2020 1648 by Bernardo Vásquez RN  Outcome: Ongoing  Goal: Control of chronic pain  Description: Control of chronic pain  3/14/2020 0136 by Bell Mosley  Outcome: Ongoing  3/13/2020 1648 by Bernardo Vásquez RN  Outcome: Ongoing     Problem: Serum Glucose Level - Abnormal:  Goal: Ability to maintain appropriate glucose levels will improve  Description: Ability to maintain appropriate glucose levels will improve  Outcome: Ongoing     Problem: Sensory Perception - Impaired:  Goal: Ability to maintain a stable neurologic state will improve  Description: Ability to maintain a stable neurologic state will improve  Outcome: Ongoing     Problem:  Activity:  Goal: Energy level will increase  Description: Energy level will increase  Outcome: Ongoing  Goal: Ability to return to normal activity level will improve  Description: Ability to return to normal activity level will improve  Outcome: Ongoing     Problem: Fluid Volume:  Goal: Maintenance of adequate hydration will improve  Description: Maintenance of adequate hydration will improve  Outcome: Ongoing     Problem: Health Behavior:  Goal: Adoption of positive health habits will improve  Description: Adoption of positive health habits will improve  Outcome: Ongoing  Goal: Compliance with immunization standards will improve  Description: Compliance with immunization standards will improve  Outcome: Ongoing     Problem: Physical Regulation:  Goal: Complications related to the disease process, condition or treatment will be avoided or minimized  Description: Complications related to the disease process, condition or treatment will be avoided or minimized  Outcome:
Problem: Pain:  Goal: Pain level will decrease  Description: Pain level will decrease  Outcome: Ongoing  Goal: Control of acute pain  Description: Control of acute pain  Outcome: Ongoing  Goal: Control of chronic pain  Description: Control of chronic pain  Outcome: Ongoing     Problem: Discharge Planning:  Goal: Discharged to appropriate level of care  Description: Discharged to appropriate level of care  Outcome: Ongoing     Problem: Serum Glucose Level - Abnormal:  Goal: Ability to maintain appropriate glucose levels will improve  Description: Ability to maintain appropriate glucose levels will improve  Outcome: Ongoing     Problem: Sensory Perception - Impaired:  Goal: Ability to maintain a stable neurologic state will improve  Description: Ability to maintain a stable neurologic state will improve  Outcome: Ongoing     Problem:  Activity:  Goal: Energy level will increase  Description: Energy level will increase  Outcome: Ongoing  Goal: Ability to return to normal activity level will improve  Description: Ability to return to normal activity level will improve  Outcome: Ongoing     Problem: Fluid Volume:  Goal: Maintenance of adequate hydration will improve  Description: Maintenance of adequate hydration will improve  Outcome: Ongoing     Problem: Health Behavior:  Goal: Adoption of positive health habits will improve  Description: Adoption of positive health habits will improve  Outcome: Ongoing  Goal: Compliance with immunization standards will improve  Description: Compliance with immunization standards will improve  Outcome: Ongoing     Problem: Physical Regulation:  Goal: Complications related to the disease process, condition or treatment will be avoided or minimized  Description: Complications related to the disease process, condition or treatment will be avoided or minimized  Outcome: Ongoing  Goal: Ability to maintain clinical measurements within normal limits will improve  Description: Ability to
1436 by Yina Ruvalcaba RN  Outcome: Ongoing     Problem: Health Behavior:  Goal: Adoption of positive health habits will improve  Description: Adoption of positive health habits will improve  3/14/2020 1436 by Yina Ruvalcaba RN  Outcome: Ongoing  Goal: Compliance with immunization standards will improve  Description: Compliance with immunization standards will improve  3/14/2020 1436 by Yian Ruvalcaba RN  Outcome: Ongoing     Problem: Physical Regulation:  Goal: Complications related to the disease process, condition or treatment will be avoided or minimized  Description: Complications related to the disease process, condition or treatment will be avoided or minimized  3/14/2020 2037 by Mohinder Polo  Outcome: Ongoing  3/14/2020 1436 by Yina Ruvalcaba RN  Outcome: Ongoing  Goal: Ability to maintain clinical measurements within normal limits will improve  Description: Ability to maintain clinical measurements within normal limits will improve  3/14/2020 2037 by Mohinder Polo  Outcome: Ongoing  3/14/2020 1436 by Yina Ruvalcaba RN  Outcome: Ongoing  Goal: Signs and symptoms of infection will decrease  Description: Signs and symptoms of infection will decrease  3/14/2020 2037 by Mohinder Polo  Outcome: Ongoing  3/14/2020 1436 by Yina Ruvalcaba RN  Outcome: Ongoing     Problem: Respiratory:  Goal: Respiratory status will improve  Description: Respiratory status will improve  3/14/2020 2037 by Mohinder Polo  Outcome: Ongoing  3/14/2020 1436 by Yina Ruvalcaba RN  Outcome: Ongoing     Problem: Sensory:  Goal: General experience of comfort will improve  Description: General experience of comfort will improve  3/14/2020 2037 by Mohinder Polo  Outcome: Ongoing  3/14/2020 1436 by Yina Ruvalcaba RN  Outcome: Ongoing     Problem: Falls - Risk of:  Goal: Will remain free from falls  Description: Will remain free from falls  Outcome: Ongoing  Goal: Absence of physical injury  Description: Absence of physical injury  Outcome: Ongoing
Ongoing     Problem: Health Behavior:  Goal: Adoption of positive health habits will improve  Description: Adoption of positive health habits will improve  3/14/2020 1436 by Pablo Walters RN  Outcome: Ongoing  3/14/2020 0136 by Los Palmer  Outcome: Ongoing  Goal: Compliance with immunization standards will improve  Description: Compliance with immunization standards will improve  3/14/2020 1436 by Pablo Walters RN  Outcome: Ongoing  3/14/2020 0136 by Los Palmer  Outcome: Ongoing     Problem: Physical Regulation:  Goal: Complications related to the disease process, condition or treatment will be avoided or minimized  Description: Complications related to the disease process, condition or treatment will be avoided or minimized  3/14/2020 1436 by Pablo Walters RN  Outcome: Ongoing  3/14/2020 0136 by Los Palmer  Outcome: Ongoing  Goal: Ability to maintain clinical measurements within normal limits will improve  Description: Ability to maintain clinical measurements within normal limits will improve  3/14/2020 1436 by Pablo Walters RN  Outcome: Ongoing  3/14/2020 0136 by Los Palmer  Outcome: Ongoing  Goal: Signs and symptoms of infection will decrease  Description: Signs and symptoms of infection will decrease  3/14/2020 1436 by Pablo Walters RN  Outcome: Ongoing  3/14/2020 0136 by Los Palmer  Outcome: Ongoing     Problem: Respiratory:  Goal: Respiratory status will improve  Description: Respiratory status will improve  3/14/2020 1436 by Pabol Walters RN  Outcome: Ongoing  3/14/2020 0136 by Los Palmer  Outcome: Ongoing     Problem: Sensory:  Goal: General experience of comfort will improve  Description: General experience of comfort will improve  3/14/2020 1436 by Pablo Walters RN  Outcome: Ongoing  3/14/2020 0136 by Los Palmer  Outcome: Ongoing

## 2020-03-16 NOTE — PROGRESS NOTES
Hospitalist Progress Note         Admit Date: 3/13/2020    PCP: Claude Perking, MD     Chief Complaint   Patient presents with    Hyperglycemia     Took her Novolog 20 units this morning around 8 am        Assessment and Plan:      DKA, type 1, not at goal [facility]) improved on insulin drip. Continue on subcu insulin as per Endo   Influenza A on Tamiflu. Continue droplet precautions.  Leukocytosis most likely from DKA. No evidence of pneumonia. Sepsis ruled out completely.  Nausea/vomiting and dehydration IVF and advance diet as tolerated.     VTE prophylaxis LMWH    Current Facility-Administered Medications   Medication Dose Route Frequency Provider Last Rate Last Dose    insulin lispro (HUMALOG) injection vial 20 Units  20 Units Subcutaneous TID [facility] Bella Torres MD        insulin lispro (HUMALOG) injection vial 0-12 Units  0-12 Units Subcutaneous 2 times per day Claude Perking, MD        ondansetron [facility]) injection 4 mg  4 mg Intravenous Q6H PRN JOAN Bennett CNP   4 mg at 03/15/20 0919    insulin glargine (LANTUS) injection vial 50 Units  50 Units Subcutaneous Nightly Claude Perking, MD   50 Units at 03/15/20 2052    insulin lispro (HUMALOG) injection vial 0-12 Units  0-12 Units Subcutaneous TID  Claude Perking, MD   4 Units at 03/15/20 1755    0.9 % sodium chloride infusion   Intravenous Continuous Claude Perking,  mL/hr at 03/16/20 0516      glucose (GLUTOSE) 40 % oral gel 15 g  15 g Oral PRN Jocelyn Vazquez PA-C        dextrose 50 % IV solution  12.5 g Intravenous PRN Jocelyn Vazquez PA-C        glucagon (rDNA) injection 1 mg  1 mg Intramuscular PRN Jocelyn Vazquez PA-C        dextrose 5 % solution  100 mL/hr Intravenous PRN Jocelyn Vazquez PA-C        enoxaparin (LOVENOX) injection 40 mg  40 mg Subcutaneous Daily JOAN Lee CNP   40 mg at 03/16/20 1117    dextrose 50 % IV solution  12.5 g Intravenous PRN JOAN Ramirez - CNP        potassium chloride 10 mEq/100 mL IVPB (Peripheral Line)  10 mEq Intravenous PRN Deryl Raymond, APRN - CNP        sodium phosphate 10 mmol in dextrose 5 % 250 mL IVPB  10 mmol Intravenous PRN Deryl Raymond, APRN - CNP        oseltamivir 6mg/ml (TAMIFLU) suspension 75 mg  75 mg Oral BID Deryl Raymond, APRN - CNP   75 mg at 03/15/20 2156    losartan (COZAAR) tablet 25 mg  25 mg Oral Daily Erica Brandon, APRN - CNP   25 mg at 03/16/20 1101    pantoprazole (PROTONIX) injection 40 mg  40 mg Intravenous Daily Erica Barndon, APRN - CNP   40 mg at 03/16/20 1102    guaiFENesin (MUCINEX) extended release tablet 600 mg  600 mg Oral BID Deryl Raymond, APRN - CNP   600 mg at 03/16/20 1101    pregabalin (LYRICA) capsule 100 mg  100 mg Oral BID Paulino Dakin, MD   100 mg at 03/16/20 1101       Subjective:     Patient denied any new complaints. Patient had thrown up again today. Objective: Intake/Output Summary (Last 24 hours) at 3/16/2020 1150  Last data filed at 3/16/2020 0133  Gross per 24 hour   Intake 2454 ml   Output --   Net 2454 ml      Vitals:   Vitals:    03/16/20 0915   BP: 120/72   Pulse: 94   Resp: 14   Temp: 97.7 °F (36.5 °C)   SpO2:      Physical Exam:  General Appearance:    Alert, cooperative, no distress  Head:      Normocephalic, without obvious abnormality, atraumatic  Eyes:       Conjunctiva/corneas clear, EOM's intact  Lungs:    Clear to auscultation bilaterally, respirations unlabored  Heart:                regular rate and rhythm, S1 and S2 normal, no murmur,   rub or gallop  Abdomen:     Soft, non-tender, bowel sounds active, no masses, no organomegaly  Extremities:   Extremities normal, atraumatic, no cyanosis or edema  Neurological:   Grossly Intact.     Significant Diagnostic Studies:   DATA:    CBC   Recent Labs     03/13/20  1325 03/14/20  0415   WBC 13.2* 16.4*   HGB 17.8* 13.8   HCT 56.0* 40.9    284      BMP   Recent Labs     03/14/20  0415 03/14/20  0756

## 2020-03-16 NOTE — PROGRESS NOTES
Nutrition Assessment    Type and Reason for Visit: Initial    Nutrition Recommendations:   Continue carb controlled diet as tolerates   Encourage consistent meal intake     Nutrition Assessment: Nutrition eval requested by clinical chart  due to concern with low BMI. Admit with DKA, flu with emesis. Varying weights in hx, current BMI 21.5. Meal intake and diet tolerance improving now, on carb controlled diet. Has attended outpatient DM self management education. Moderate nutrition risk with hx inadequate oral intake in illness but noted plan for discharge today. Malnutrition Assessment:  · Malnutrition Status: Insufficient data  · Context: Acute illness or injury    Nutrition Risk Level:  Moderate    Nutrient Needs:  · Estimated Daily Total Kcal: 9253-7570 (25-30 sandeep/kg current weight)   · Estimated Daily Protein (g): 58-69 (1-1.2 g/kg)  · Estimated Daily Total Fluid (ml/day): 1800 (1ml/sandeep)     Nutrition Diagnosis:   · Problem: Inadequate oral intake  · Etiology: related to Alteration in GI function, Nausea     Signs and symptoms:  as evidenced by Diet history of poor intake    Objective Information:  · Nutrition-Focused Physical Findings: curled up asleep in bed on visit   · Wound Type:    · Current Nutrition Therapies:  · Oral Diet Orders: Carb Control 4 Carbs/Meal   · Oral Diet intake: %  · Oral Nutrition Supplement (ONS) Orders: None  · ONS intake:    · Anthropometric Measures:  · Ht: 5' 4.96\" (165 cm)   · Current Body Wt: 129 lb (58.5 kg)  · Admission Body Wt:    · Usual Body Wt:    · % Weight Change:  ,  varying weights   · Ideal Body Wt: 125 lb (56.7 kg), % Ideal Body 103  · BMI Classification: BMI 18.5 - 24.9 Normal Weight    Nutrition Interventions:   Continue current diet  Continued Inpatient Monitoring, Education not appropriate at this time, Coordination of Care    Nutrition Evaluation:   · Evaluation: Goals set   · Goals: Patient will tolerate diet with meal intake at least 75% · Monitoring: Meal Intake, Pertinent Labs, Weight, Diet Tolerance, Nausea or Vomiting      Electronically signed by Merlin Lagunas RD, LD on 3/16/20 at 10:53 AM EDT    Contact Number: [phone]

## 2020-03-16 NOTE — DISCHARGE SUMMARY
Estela Charles 1995 [id number]  PCP:  Sivakumar Barajas MD    Admit date: 3/13/2020  Admitting Physician: Taty Garcia MD    Discharge date: 3/16/2020 Discharge Physician: Pierre Briggs MD         Discharge Diagnoses. As per below    Hospital Course:  History of present illness at admission: As per H&P  Subsequent Hospital Course:      DKA, type 1, not at goal [facility]) improved on insulin drip. Continue on subcu insulin as per Endo   Influenza A on Tamiflu. Kept on droplet precautions   Leukocytosis most likely from DKA. No evidence of pneumonia. Sepsis ruled out.  Nausea/vomiting and dehydration IVF and advance diet as tolerated     VTE prophylaxis LMWH    On the day of discharge, pt felt better. No new complaints.     Pertinent Exam Findings on Day of Discharge:  General Appearance:    Alert, cooperative, no distress, appears stated age  Head:    Normocephalic, without obvious abnormality, atraumatic  Eyes:    PERRL, conjunctiva/corneas clear, EOM's intact  Lungs:    Clear to auscultation bilaterally, respirations unlabored   Heart:    Regular rate and rhythm, S1 and S2 normal, no murmur,   rub or gallop  Abdomen:     Soft, non-tender, bowel sounds active, no masses, no organomegaly  Extremities:   Extremities normal, atraumatic, no cyanosis or edema    Consults:  IP CONSULT TO HOSPITALIST  IP CONSULT TO ENDOCRINOLOGY    Patient Instructions:   Delfino Fall   Home Medication Instructions PHV:[id number]    Printed on:03/16/20 0950   Medication Information                      guaiFENesin (MUCINEX) 600 MG extended release tablet  Take 1 tablet by mouth 2 times daily for 10 days             insulin aspart (NOVOLOG) 100 UNIT/ML injection vial  Inject into the skin See Admin Instructions 20 units with breakfast and lunch and 25 units with dinner and at bedtime             insulin glargine (LANTUS) 100 UNIT/ML injection vial  Inject 50 Units into the skin nightly

## 2020-03-16 NOTE — CARE COORDINATION
MAGDALENAW notified that Pt has a discharge for today. Pt does not have insurance. Referral to [facility] to help Pt with the cost of Rx. [facility] guidelines will only allow help with the Oesltamivir and Ondansetron. The Guaifenesin is an OTC rx.      Electronically signed by BRIAN Gloria on 3/16/2020 at 10:28 AM

## 2020-03-16 NOTE — FLOWSHEET NOTE
Patient was unable to eat her breakfast tray due to nausea, Dr. Stephane Garg notified via perfect serve. Will hold off on discharge until after lunch and see how patient does.

## 2020-03-16 NOTE — CARE COORDINATION
Received call from Sabi Blackwell [place]. Patient on discharge order and will need help with Tamiflu and Ondansetron. I requested the scripts be sent down to [facility]. Faxing a 1x voucher to them. Patient will be placed on the flagged list and required to complete an application should she need further assistance.

## 2020-03-18 LAB
CULTURE: NORMAL
CULTURE: NORMAL
Lab: NORMAL
Lab: NORMAL
SPECIMEN: NORMAL
SPECIMEN: NORMAL

## 2020-04-12 PROBLEM — J10.1 INFLUENZA A: Status: RESOLVED | Noted: 2020-03-13 | Resolved: 2020-04-12

## 2020-04-20 ENCOUNTER — HOSPITAL ENCOUNTER (INPATIENT)
Age: 25
LOS: 4 days | Discharge: HOME | DRG: 638 | End: 2020-04-24
Attending: EMERGENCY MEDICINE | Admitting: HOSPITALIST
Payer: COMMERCIAL

## 2020-04-20 ENCOUNTER — APPOINTMENT (OUTPATIENT)
Dept: GENERAL RADIOLOGY | Age: 25
DRG: 638 | End: 2020-04-20
Payer: COMMERCIAL

## 2020-04-20 LAB
ALBUMIN SERPL-MCNC: 4.8 GM/DL (ref 3.4–5)
ALP BLD-CCNC: 86 IU/L (ref 40–129)
ALT SERPL-CCNC: 6 U/L (ref 10–40)
ANION GAP SERPL CALCULATED.3IONS-SCNC: 18 MMOL/L (ref 4–16)
ANION GAP SERPL CALCULATED.3IONS-SCNC: 25 MMOL/L (ref 4–16)
ANION GAP SERPL CALCULATED.3IONS-SCNC: 30 MMOL/L (ref 4–16)
AST SERPL-CCNC: 9 IU/L (ref 15–37)
BACTERIA: ABNORMAL /HPF
BASE EXCESS: 24 (ref 0–2.4)
BASE EXCESS: 28 (ref 0–2.4)
BASOPHILS ABSOLUTE: 0.1 K/CU MM
BASOPHILS RELATIVE PERCENT: 0.8 % (ref 0–1)
BETA-HYDROXYBUTYRATE: >20.8 MG/DL (ref 0–3)
BILIRUB SERPL-MCNC: 0.5 MG/DL (ref 0–1)
BILIRUBIN URINE: NEGATIVE MG/DL
BLOOD, URINE: NEGATIVE
BUN BLDV-MCNC: 8 MG/DL (ref 6–23)
BUN BLDV-MCNC: 8 MG/DL (ref 6–23)
BUN BLDV-MCNC: 9 MG/DL (ref 6–23)
CALCIUM SERPL-MCNC: 9.1 MG/DL (ref 8.3–10.6)
CALCIUM SERPL-MCNC: 9.3 MG/DL (ref 8.3–10.6)
CALCIUM SERPL-MCNC: 9.8 MG/DL (ref 8.3–10.6)
CHLORIDE BLD-SCNC: 110 MMOL/L (ref 99–110)
CHLORIDE BLD-SCNC: 111 MMOL/L (ref 99–110)
CHLORIDE BLD-SCNC: 96 MMOL/L (ref 99–110)
CHP ED QC CHECK: YES
CHP ED QC CHECK: YES
CLARITY: CLEAR
CO2: 3 MMOL/L (ref 21–32)
CO2: 4 MMOL/L (ref 21–32)
CO2: 8 MMOL/L (ref 21–32)
COLOR: ABNORMAL
COMMENT: ABNORMAL
COMMENT: ABNORMAL
CREAT SERPL-MCNC: 0.4 MG/DL (ref 0.6–1.1)
CREAT SERPL-MCNC: 0.5 MG/DL (ref 0.6–1.1)
CREAT SERPL-MCNC: 0.6 MG/DL (ref 0.6–1.1)
DIFFERENTIAL TYPE: ABNORMAL
EOSINOPHILS ABSOLUTE: 0 K/CU MM
EOSINOPHILS RELATIVE PERCENT: 0 % (ref 0–3)
ESTIMATED AVERAGE GLUCOSE: 335 MG/DL
GFR AFRICAN AMERICAN: >60 ML/MIN/1.73M2
GFR NON-AFRICAN AMERICAN: >60 ML/MIN/1.73M2
GLUCOSE BLD-MCNC: 179 MG/DL (ref 70–99)
GLUCOSE BLD-MCNC: 210 MG/DL (ref 70–99)
GLUCOSE BLD-MCNC: 237 MG/DL (ref 70–99)
GLUCOSE BLD-MCNC: 238 MG/DL (ref 70–99)
GLUCOSE BLD-MCNC: 241 MG/DL (ref 70–99)
GLUCOSE BLD-MCNC: 260 MG/DL (ref 70–99)
GLUCOSE BLD-MCNC: 400 MG/DL (ref 70–99)
GLUCOSE BLD-MCNC: 422 MG/DL
GLUCOSE BLD-MCNC: 422 MG/DL (ref 70–99)
GLUCOSE BLD-MCNC: 429 MG/DL
GLUCOSE BLD-MCNC: 429 MG/DL (ref 70–99)
GLUCOSE BLD-MCNC: 445 MG/DL (ref 70–99)
GLUCOSE BLD-MCNC: 505 MG/DL (ref 70–99)
GLUCOSE, URINE: >500 MG/DL
HBA1C MFR BLD: 13.3 % (ref 4.2–6.3)
HCG QUALITATIVE: NEGATIVE
HCO3 VENOUS: 3.8 MMOL/L (ref 19–25)
HCO3 VENOUS: 3.9 MMOL/L (ref 19–25)
HCT VFR BLD CALC: 56.3 % (ref 37–47)
HEMOGLOBIN: 18.3 GM/DL (ref 12.5–16)
IMMATURE NEUTROPHIL %: 0.5 % (ref 0–0.43)
KETONES, URINE: ABNORMAL MG/DL
LACTATE: 1.2 MMOL/L (ref 0.4–2)
LEUKOCYTE ESTERASE, URINE: NEGATIVE
LIPASE: 17 IU/L (ref 13–60)
LYMPHOCYTES ABSOLUTE: 3.9 K/CU MM
LYMPHOCYTES RELATIVE PERCENT: 22.6 % (ref 24–44)
MCH RBC QN AUTO: 29.9 PG (ref 27–31)
MCHC RBC AUTO-ENTMCNC: 32.5 % (ref 32–36)
MCV RBC AUTO: 92 FL (ref 78–100)
MONOCYTES ABSOLUTE: 0.6 K/CU MM
MONOCYTES RELATIVE PERCENT: 3.3 % (ref 0–4)
MUCUS: ABNORMAL HPF
NITRITE URINE, QUANTITATIVE: NEGATIVE
NUCLEATED RBC %: 0 %
O2 SAT, VEN: 92.3 % (ref 50–70)
O2 SAT, VEN: 92.6 % (ref 50–70)
PCO2, VEN: 13 MMHG (ref 38–52)
PCO2, VEN: 19 MMHG (ref 38–52)
PDW BLD-RTO: 11.7 % (ref 11.7–14.9)
PH VENOUS: 6.91 (ref 7.32–7.42)
PH VENOUS: 7.09 (ref 7.32–7.42)
PH, URINE: 5 (ref 5–8)
PLATELET # BLD: 359 K/CU MM (ref 140–440)
PMV BLD AUTO: 10.7 FL (ref 7.5–11.1)
PO2, VEN: 106 MMHG (ref 28–48)
PO2, VEN: 132 MMHG (ref 28–48)
POTASSIUM SERPL-SCNC: 3.8 MMOL/L (ref 3.5–5.1)
POTASSIUM SERPL-SCNC: 4.6 MMOL/L (ref 3.5–5.1)
POTASSIUM SERPL-SCNC: 4.6 MMOL/L (ref 3.5–5.1)
PROTEIN UA: 30 MG/DL
RBC # BLD: 6.12 M/CU MM (ref 4.2–5.4)
RBC URINE: <1 /HPF (ref 0–6)
SEGMENTED NEUTROPHILS ABSOLUTE COUNT: 12.5 K/CU MM
SEGMENTED NEUTROPHILS RELATIVE PERCENT: 72.8 % (ref 36–66)
SODIUM BLD-SCNC: 130 MMOL/L (ref 135–145)
SODIUM BLD-SCNC: 136 MMOL/L (ref 135–145)
SODIUM BLD-SCNC: 139 MMOL/L (ref 135–145)
SPECIFIC GRAVITY UA: 1.02 (ref 1–1.03)
SQUAMOUS EPITHELIAL: 1 /HPF
TOTAL IMMATURE NEUTOROPHIL: 0.09 K/CU MM
TOTAL NUCLEATED RBC: 0 K/CU MM
TOTAL PROTEIN: 8.9 GM/DL (ref 6.4–8.2)
TRICHOMONAS: ABNORMAL /HPF
TROPONIN T: <0.01 NG/ML
TROPONIN T: <0.01 NG/ML
UROBILINOGEN, URINE: NORMAL MG/DL (ref 0.2–1)
WBC # BLD: 17.2 K/CU MM (ref 4–10.5)
WBC UA: 1 /HPF (ref 0–5)

## 2020-04-20 PROCEDURE — 99285 EMERGENCY DEPT VISIT HI MDM: CPT

## 2020-04-20 PROCEDURE — 2000000000 HC ICU R&B

## 2020-04-20 PROCEDURE — 2580000003 HC RX 258: Performed by: HOSPITALIST

## 2020-04-20 PROCEDURE — 82805 BLOOD GASES W/O2 SATURATION: CPT

## 2020-04-20 PROCEDURE — 84703 CHORIONIC GONADOTROPIN ASSAY: CPT

## 2020-04-20 PROCEDURE — 87040 BLOOD CULTURE FOR BACTERIA: CPT

## 2020-04-20 PROCEDURE — 6370000000 HC RX 637 (ALT 250 FOR IP): Performed by: PHYSICIAN ASSISTANT

## 2020-04-20 PROCEDURE — 80048 BASIC METABOLIC PNL TOTAL CA: CPT

## 2020-04-20 PROCEDURE — 36415 COLL VENOUS BLD VENIPUNCTURE: CPT

## 2020-04-20 PROCEDURE — 6360000002 HC RX W HCPCS: Performed by: HOSPITALIST

## 2020-04-20 PROCEDURE — 83605 ASSAY OF LACTIC ACID: CPT

## 2020-04-20 PROCEDURE — 81001 URINALYSIS AUTO W/SCOPE: CPT

## 2020-04-20 PROCEDURE — 83690 ASSAY OF LIPASE: CPT

## 2020-04-20 PROCEDURE — 94761 N-INVAS EAR/PLS OXIMETRY MLT: CPT

## 2020-04-20 PROCEDURE — 71045 X-RAY EXAM CHEST 1 VIEW: CPT

## 2020-04-20 PROCEDURE — 84484 ASSAY OF TROPONIN QUANT: CPT

## 2020-04-20 PROCEDURE — 82962 GLUCOSE BLOOD TEST: CPT

## 2020-04-20 PROCEDURE — 36600 WITHDRAWAL OF ARTERIAL BLOOD: CPT

## 2020-04-20 PROCEDURE — 2580000003 HC RX 258: Performed by: PHYSICIAN ASSISTANT

## 2020-04-20 PROCEDURE — 80053 COMPREHEN METABOLIC PANEL: CPT

## 2020-04-20 PROCEDURE — 93005 ELECTROCARDIOGRAM TRACING: CPT | Performed by: PHYSICIAN ASSISTANT

## 2020-04-20 PROCEDURE — 85025 COMPLETE CBC W/AUTO DIFF WBC: CPT

## 2020-04-20 PROCEDURE — 83036 HEMOGLOBIN GLYCOSYLATED A1C: CPT

## 2020-04-20 PROCEDURE — 82010 KETONE BODYS QUAN: CPT

## 2020-04-20 PROCEDURE — 6360000002 HC RX W HCPCS: Performed by: PHYSICIAN ASSISTANT

## 2020-04-20 PROCEDURE — 93010 ELECTROCARDIOGRAM REPORT: CPT | Performed by: INTERNAL MEDICINE

## 2020-04-20 RX ORDER — 0.9 % SODIUM CHLORIDE 0.9 %
1000 INTRAVENOUS SOLUTION INTRAVENOUS ONCE
Status: COMPLETED | OUTPATIENT
Start: 2020-04-20 | End: 2020-04-20

## 2020-04-20 RX ORDER — POTASSIUM CHLORIDE 7.45 MG/ML
10 INJECTION INTRAVENOUS PRN
Status: DISCONTINUED | OUTPATIENT
Start: 2020-04-20 | End: 2020-04-24 | Stop reason: HOSPADM

## 2020-04-20 RX ORDER — DEXTROSE MONOHYDRATE 25 G/50ML
12.5 INJECTION, SOLUTION INTRAVENOUS PRN
Status: DISCONTINUED | OUTPATIENT
Start: 2020-04-20 | End: 2020-04-24 | Stop reason: HOSPADM

## 2020-04-20 RX ORDER — SODIUM CHLORIDE 9 MG/ML
INJECTION, SOLUTION INTRAVENOUS CONTINUOUS
Status: DISCONTINUED | OUTPATIENT
Start: 2020-04-20 | End: 2020-04-21 | Stop reason: SDUPTHER

## 2020-04-20 RX ORDER — 0.9 % SODIUM CHLORIDE 0.9 %
15 INTRAVENOUS SOLUTION INTRAVENOUS ONCE
Status: COMPLETED | OUTPATIENT
Start: 2020-04-20 | End: 2020-04-20

## 2020-04-20 RX ORDER — DEXTROSE AND SODIUM CHLORIDE 5; .45 G/100ML; G/100ML
INJECTION, SOLUTION INTRAVENOUS CONTINUOUS PRN
Status: DISCONTINUED | OUTPATIENT
Start: 2020-04-20 | End: 2020-04-22

## 2020-04-20 RX ORDER — PANTOPRAZOLE SODIUM 40 MG/10ML
40 INJECTION, POWDER, LYOPHILIZED, FOR SOLUTION INTRAVENOUS DAILY
Status: DISCONTINUED | OUTPATIENT
Start: 2020-04-20 | End: 2020-04-24 | Stop reason: HOSPADM

## 2020-04-20 RX ORDER — NICOTINE POLACRILEX 4 MG
15 LOZENGE BUCCAL PRN
Status: DISCONTINUED | OUTPATIENT
Start: 2020-04-20 | End: 2020-04-24 | Stop reason: HOSPADM

## 2020-04-20 RX ORDER — MAGNESIUM SULFATE 1 G/100ML
1 INJECTION INTRAVENOUS PRN
Status: DISCONTINUED | OUTPATIENT
Start: 2020-04-20 | End: 2020-04-24 | Stop reason: HOSPADM

## 2020-04-20 RX ORDER — ONDANSETRON 2 MG/ML
4 INJECTION INTRAMUSCULAR; INTRAVENOUS EVERY 6 HOURS PRN
Status: DISCONTINUED | OUTPATIENT
Start: 2020-04-20 | End: 2020-04-24 | Stop reason: HOSPADM

## 2020-04-20 RX ORDER — DEXTROSE MONOHYDRATE 50 MG/ML
100 INJECTION, SOLUTION INTRAVENOUS PRN
Status: DISCONTINUED | OUTPATIENT
Start: 2020-04-20 | End: 2020-04-24 | Stop reason: HOSPADM

## 2020-04-20 RX ADMIN — DEXTROSE AND SODIUM CHLORIDE: 5; 450 INJECTION, SOLUTION INTRAVENOUS at 19:21

## 2020-04-20 RX ADMIN — ENOXAPARIN SODIUM 40 MG: 40 INJECTION SUBCUTANEOUS at 18:16

## 2020-04-20 RX ADMIN — SODIUM CHLORIDE 1000 ML: 9 INJECTION, SOLUTION INTRAVENOUS at 08:30

## 2020-04-20 RX ADMIN — SODIUM CHLORIDE 878 ML: 9 INJECTION, SOLUTION INTRAVENOUS at 13:47

## 2020-04-20 RX ADMIN — ONDANSETRON 4 MG: 2 INJECTION INTRAMUSCULAR; INTRAVENOUS at 20:56

## 2020-04-20 RX ADMIN — SODIUM CHLORIDE 0.5 UNITS/HR: 900 INJECTION, SOLUTION INTRAVENOUS at 10:16

## 2020-04-20 RX ADMIN — SODIUM CHLORIDE 1000 ML: 9 INJECTION, SOLUTION INTRAVENOUS at 09:09

## 2020-04-20 ASSESSMENT — PAIN SCALES - GENERAL
PAINLEVEL_OUTOF10: 0

## 2020-04-20 NOTE — LETTER
[facility] [phone] [id number]  Phone: [phone]             April 24, 2020    Patient: Marty Bazan   YOB: 1995   Date of Visit: 4/20/2020       To Whom It May Concern:    Estela Blackmon was seen and treated in our facility  beginning 4/20/2020 until 4/24/2020. She may return to work on 4/27/2020.       Sincerely,       Brunilda Naqvi        Signature:__________________________________

## 2020-04-20 NOTE — ED PROVIDER NOTES
Alkaline Phosphatase 86 40 - 129 IU/L    GFR Non-African American >60 >60 mL/min/1.73m2    GFR African American >60 >60 mL/min/1.73m2    Anion Gap 30 (H) 4 - 16   Beta-Hydroxybutyrate   Result Value Ref Range    Beta-Hydroxybutyrate >20.8 (H) 0.0 - 3.0 MG/DL   HCG Serum, Qualitative   Result Value Ref Range    hCG Qual NEGATIVE    Lipase   Result Value Ref Range    Lipase 17 13 - 60 IU/L   Hemoglobin A1c   Result Value Ref Range    Hemoglobin A1C 13.3 (H) 4.2 - 6.3 %    eAG 335 mg/dL   POCT Glucose   Result Value Ref Range    POC Glucose 445 (H) 70 - 99 MG/DL   EKG 12 Lead   Result Value Ref Range    Ventricular Rate 125 BPM    Atrial Rate 125 BPM    P-R Interval 96 ms    QRS Duration 84 ms    Q-T Interval 326 ms    QTc Calculation (Bazett) 470 ms    P Axis 81 degrees    R Axis 80 degrees    T Axis 27 degrees    Diagnosis       Sinus tachycardia with short AR  Right atrial enlargement  Nonspecific ST and T wave abnormality  Abnormal ECG  No previous ECGs available           MDM  Patient presents for nausea, vomiting, decreased appetite. On exam she is Kussmaul breathing. She is type I diabetic. Laboratory work-up reveals patient is in DKA. Started on 2 normal saline boluses and insulin drip. No clear source of this beyond patient reporting that she is to use slightly less insulin recently. Hemoglobin A1c is 13.3, I do have concern for underlying poor control. Consult placed to hospitalist who will admit. This patient was also seen and evaluated by Dr. Jose Manuel Mccray    Final Impression  1. Diabetic ketoacidosis without coma associated with type 1 diabetes mellitus (HCC)        Blood pressure (!) 157/94, pulse 125, temperature 97.3 °F (36.3 °C), temperature source Axillary, resp. rate 18, height 5' 4\" (1.626 m), weight 129 lb (58.5 kg), last menstrual period 03/20/2020, SpO2 100 %. Disposition:  Discharge to home in stable condition. Patient was given scripts for the following medications.  I counseled

## 2020-04-20 NOTE — PROGRESS NOTES
Lab called about BMP results. State they did not have an order for BMP so did not run it. Said they would run the test now.

## 2020-04-20 NOTE — LETTER
[facility] [address]  [address] [address]  Phone: [phone]             April 24, 2020    Patient: Darwin Carmen   YOB: 1995   Date of Visit: 4/20/2020       To Whom It May Concern:    Estela Zafar was seen and treated in our facility  beginning 4/20/2020 until 4/24/2020. She may return to work on Monday 4/27/2020.       Sincerely,       Nicko Ulrich Wonn         Signature:__________________________________

## 2020-04-20 NOTE — H&P
Transportation needs     Medical: None     Non-medical: None   Tobacco Use    Smoking status: Never Smoker    Smokeless tobacco: Never Used   Substance and Sexual Activity    Alcohol use: No    Drug use: No    Sexual activity: None   Lifestyle    Physical activity     Days per week: None     Minutes per session: None    Stress: None   Relationships    Social connections     Talks on phone: None     Gets together: None     Attends Islam service: None     Active member of club or organization: None     Attends meetings of clubs or organizations: None     Relationship status: None    Intimate partner violence     Fear of current or ex partner: None     Emotionally abused: None     Physically abused: None     Forced sexual activity: None   Other Topics Concern    None   Social History Narrative    None    Family History   Problem Relation Age of Onset    Diabetes Mother     High Blood Pressure Mother     Kidney Disease Mother     Vision Loss Mother         HOME MEDICATIONS    Prior to Admission medications    Medication Sig Start Date End Date Taking? Authorizing Provider   insulin glargine (LANTUS) 100 UNIT/ML injection vial Inject 50 Units into the skin nightly 3/16/20  Yes Lynda Whitehead MD   insulin aspart (NOVOLOG) 100 UNIT/ML injection vial Inject into the skin See Admin Instructions 20 units with breakfast and lunch and 25 units with dinner and at bedtime   Yes Historical Provider, MD   losartan (COZAAR) 25 MG tablet Take 1 tablet by mouth daily 12/3/19  Yes Nimesh Serna MD          OBJECTIVE  Vitals:    04/20/20 1030   BP: (!) 149/75   Pulse: 121   Resp: 25   Temp:    SpO2: 100%     No intake or output data in the 24 hours ending 04/20/20 1105  No intake or output data in the 24 hours ending 04/20/20 1105          No intake/output data recorded. PHYSICAL EXAM   GEN Awake female, sitting upright in bed in no apparent distress. Appears given age. EYES Pupils are equally round.   No from severe acidosis. Started DKA protocol.    2-SIRS- with tachycardia, tachypnea and leukocytosis- due to above- expect to improve with treatment of above- blood culture ordered  3- HTN- hold losartan for now    Electrolyte replacements per protocol with DKA    Diet No diet orders on file   DVT Prophylaxis [x] Lovenox, []  Heparin, [] SCDs, [] Ambulation   GI Prophylaxis [] PPI,  [] H2 Blocker,  [] Carafate,  [] Diet/Tube Feeds   Code Status Prior   Disposition Patient requires continued admission due to severe DKA   CMS Level of Risk [] Low, [] Moderate,[x]  High  Patient's risk as above due DKA       Dr. Shoshana Schlatter, MD 4/20/2020 11:05 AM

## 2020-04-21 LAB
ANION GAP SERPL CALCULATED.3IONS-SCNC: 12 MMOL/L (ref 4–16)
ANION GAP SERPL CALCULATED.3IONS-SCNC: 13 MMOL/L (ref 4–16)
ANION GAP SERPL CALCULATED.3IONS-SCNC: 14 MMOL/L (ref 4–16)
BUN BLDV-MCNC: 7 MG/DL (ref 6–23)
BUN BLDV-MCNC: 9 MG/DL (ref 6–23)
BUN BLDV-MCNC: 9 MG/DL (ref 6–23)
CALCIUM SERPL-MCNC: 9.1 MG/DL (ref 8.3–10.6)
CALCIUM SERPL-MCNC: 9.1 MG/DL (ref 8.3–10.6)
CALCIUM SERPL-MCNC: 9.4 MG/DL (ref 8.3–10.6)
CHLORIDE BLD-SCNC: 106 MMOL/L (ref 99–110)
CHLORIDE BLD-SCNC: 111 MMOL/L (ref 99–110)
CHLORIDE BLD-SCNC: 112 MMOL/L (ref 99–110)
CO2: 11 MMOL/L (ref 21–32)
CO2: 12 MMOL/L (ref 21–32)
CO2: 12 MMOL/L (ref 21–32)
CREAT SERPL-MCNC: 0.4 MG/DL (ref 0.6–1.1)
GFR AFRICAN AMERICAN: >60 ML/MIN/1.73M2
GFR NON-AFRICAN AMERICAN: >60 ML/MIN/1.73M2
GLUCOSE BLD-MCNC: 102 MG/DL (ref 70–99)
GLUCOSE BLD-MCNC: 158 MG/DL (ref 70–99)
GLUCOSE BLD-MCNC: 159 MG/DL (ref 70–99)
GLUCOSE BLD-MCNC: 177 MG/DL (ref 70–99)
GLUCOSE BLD-MCNC: 178 MG/DL (ref 70–99)
GLUCOSE BLD-MCNC: 179 MG/DL (ref 70–99)
GLUCOSE BLD-MCNC: 180 MG/DL (ref 70–99)
GLUCOSE BLD-MCNC: 182 MG/DL (ref 70–99)
GLUCOSE BLD-MCNC: 198 MG/DL (ref 70–99)
GLUCOSE BLD-MCNC: 199 MG/DL (ref 70–99)
GLUCOSE BLD-MCNC: 226 MG/DL (ref 70–99)
GLUCOSE BLD-MCNC: 233 MG/DL (ref 70–99)
GLUCOSE BLD-MCNC: 242 MG/DL (ref 70–99)
GLUCOSE BLD-MCNC: 265 MG/DL (ref 70–99)
HCT VFR BLD CALC: 44.4 % (ref 37–47)
HEMOGLOBIN: 15 GM/DL (ref 12.5–16)
MAGNESIUM: 1.8 MG/DL (ref 1.8–2.4)
MCH RBC QN AUTO: 29.5 PG (ref 27–31)
MCHC RBC AUTO-ENTMCNC: 33.8 % (ref 32–36)
MCV RBC AUTO: 87.2 FL (ref 78–100)
PDW BLD-RTO: 11.8 % (ref 11.7–14.9)
PHOSPHORUS: 1.3 MG/DL (ref 2.5–4.9)
PLATELET # BLD: 305 K/CU MM (ref 140–440)
PMV BLD AUTO: 10.4 FL (ref 7.5–11.1)
POTASSIUM SERPL-SCNC: 3.2 MMOL/L (ref 3.5–5.1)
POTASSIUM SERPL-SCNC: 3.4 MMOL/L (ref 3.5–5.1)
POTASSIUM SERPL-SCNC: 3.8 MMOL/L (ref 3.5–5.1)
RBC # BLD: 5.09 M/CU MM (ref 4.2–5.4)
SODIUM BLD-SCNC: 131 MMOL/L (ref 135–145)
SODIUM BLD-SCNC: 135 MMOL/L (ref 135–145)
SODIUM BLD-SCNC: 137 MMOL/L (ref 135–145)
WBC # BLD: 20.9 K/CU MM (ref 4–10.5)

## 2020-04-21 PROCEDURE — 2580000003 HC RX 258: Performed by: HOSPITALIST

## 2020-04-21 PROCEDURE — 6360000002 HC RX W HCPCS: Performed by: HOSPITALIST

## 2020-04-21 PROCEDURE — 6370000000 HC RX 637 (ALT 250 FOR IP): Performed by: INTERNAL MEDICINE

## 2020-04-21 PROCEDURE — 2580000003 HC RX 258: Performed by: INTERNAL MEDICINE

## 2020-04-21 PROCEDURE — 94761 N-INVAS EAR/PLS OXIMETRY MLT: CPT

## 2020-04-21 PROCEDURE — 1200000000 HC SEMI PRIVATE

## 2020-04-21 PROCEDURE — 6360000002 HC RX W HCPCS: Performed by: INTERNAL MEDICINE

## 2020-04-21 PROCEDURE — 83735 ASSAY OF MAGNESIUM: CPT

## 2020-04-21 PROCEDURE — 82962 GLUCOSE BLOOD TEST: CPT

## 2020-04-21 PROCEDURE — 2500000003 HC RX 250 WO HCPCS: Performed by: HOSPITALIST

## 2020-04-21 PROCEDURE — 84100 ASSAY OF PHOSPHORUS: CPT

## 2020-04-21 PROCEDURE — C9113 INJ PANTOPRAZOLE SODIUM, VIA: HCPCS | Performed by: INTERNAL MEDICINE

## 2020-04-21 PROCEDURE — 80048 BASIC METABOLIC PNL TOTAL CA: CPT

## 2020-04-21 PROCEDURE — 36415 COLL VENOUS BLD VENIPUNCTURE: CPT

## 2020-04-21 PROCEDURE — 85027 COMPLETE CBC AUTOMATED: CPT

## 2020-04-21 RX ORDER — SODIUM CHLORIDE 9 MG/ML
1000 INJECTION, SOLUTION INTRAVENOUS CONTINUOUS
Status: DISCONTINUED | OUTPATIENT
Start: 2020-04-21 | End: 2020-04-22

## 2020-04-21 RX ORDER — INSULIN GLARGINE 100 [IU]/ML
15 INJECTION, SOLUTION SUBCUTANEOUS ONCE
Status: COMPLETED | OUTPATIENT
Start: 2020-04-21 | End: 2020-04-21

## 2020-04-21 RX ORDER — INSULIN GLARGINE 100 [IU]/ML
35 INJECTION, SOLUTION SUBCUTANEOUS NIGHTLY
Status: DISCONTINUED | OUTPATIENT
Start: 2020-04-21 | End: 2020-04-23

## 2020-04-21 RX ADMIN — SODIUM CHLORIDE: 9 INJECTION, SOLUTION INTRAVENOUS at 17:24

## 2020-04-21 RX ADMIN — INSULIN LISPRO 15 UNITS: 100 INJECTION, SOLUTION INTRAVENOUS; SUBCUTANEOUS at 16:46

## 2020-04-21 RX ADMIN — DEXTROSE AND SODIUM CHLORIDE: 5; 450 INJECTION, SOLUTION INTRAVENOUS at 02:06

## 2020-04-21 RX ADMIN — POTASSIUM CHLORIDE 10 MEQ: 7.46 INJECTION, SOLUTION INTRAVENOUS at 08:11

## 2020-04-21 RX ADMIN — POTASSIUM CHLORIDE 10 MEQ: 7.46 INJECTION, SOLUTION INTRAVENOUS at 08:12

## 2020-04-21 RX ADMIN — INSULIN GLARGINE 15 UNITS: 100 INJECTION, SOLUTION SUBCUTANEOUS at 23:07

## 2020-04-21 RX ADMIN — PANTOPRAZOLE SODIUM 40 MG: 40 INJECTION, POWDER, FOR SOLUTION INTRAVENOUS at 08:05

## 2020-04-21 RX ADMIN — SODIUM CHLORIDE 3 UNITS/HR: 900 INJECTION, SOLUTION INTRAVENOUS at 11:02

## 2020-04-21 RX ADMIN — POTASSIUM CHLORIDE 10 MEQ: 7.46 INJECTION, SOLUTION INTRAVENOUS at 08:13

## 2020-04-21 RX ADMIN — POTASSIUM CHLORIDE 10 MEQ: 7.46 INJECTION, SOLUTION INTRAVENOUS at 08:05

## 2020-04-21 RX ADMIN — SODIUM PHOSPHATE, MONOBASIC, MONOHYDRATE 20 MMOL: 276; 142 INJECTION, SOLUTION INTRAVENOUS at 10:04

## 2020-04-21 RX ADMIN — ENOXAPARIN SODIUM 40 MG: 40 INJECTION SUBCUTANEOUS at 08:05

## 2020-04-21 RX ADMIN — DEXTROSE AND SODIUM CHLORIDE: 5; 450 INJECTION, SOLUTION INTRAVENOUS at 09:26

## 2020-04-21 RX ADMIN — CEFTRIAXONE SODIUM 1 G: 1 INJECTION, POWDER, FOR SOLUTION INTRAMUSCULAR; INTRAVENOUS at 09:21

## 2020-04-21 ASSESSMENT — PAIN SCALES - GENERAL
PAINLEVEL_OUTOF10: 0

## 2020-04-21 NOTE — PROGRESS NOTES
Nutrition Assessment    Type and Reason for Visit: Initial, Positive Nutrition Screen    Nutrition Recommendations:   · Continue current diet  · Start diabetic supplements for once diet advances    Nutrition Assessment: Pt is admitted to the hospital 2/2 type one diabetes and not taking her insulin. Pt was asleep and cover up on my visit. Will provide pt with diabetic supplements and follow for education once she is out of the ICU. Malnutrition Assessment:  · Malnutrition Status: Mild Malnutrition  · Context: Chronic illness  · Findings of the 6 clinical characteristics of malnutrition (Minimum of 2 out of 6 clinical characteristics is required to make the diagnosis of moderate or severe Protein Calorie Malnutrition based on AND/ASPEN Guidelines):  1. Energy Intake-Unable to assess, Unable to assess    2. Weight Loss-Unable to assess, unable to assess  3. Fat Loss-No significant subcutaneous fat loss, Orbital  4. Muscle Loss-No significant muscle mass loss, Clavicles (pectoralis and deltoids), Calf (gastrocnemius)  5. Fluid Accumulation-No significant fluid accumulation, Generalized  6.   Strength-Not measured    Nutrition Risk Level: High    Nutrient Needs:  · Estimated Daily Total Kcal: 4593-1094 based on 303-5 kcal/kg/current BW  · Estimated Daily Protein (g): 59-69 based on 1.2-1.4 g/kg/current BW  · Estimated Daily Total Fluid (ml/day): 1584-5188 based on 1 mL/kcal    Nutrition Diagnosis:   · Problem: Inadequate oral intake  · Etiology: related to Alteration in GI function, Endocrine dysfunction     Signs and symptoms:  as evidenced by Diet history of poor intake, Weight loss    Objective Information:  · Wound Type: None  · Current Nutrition Therapies:  · Oral Diet Orders: Clear Liquid   · Oral Diet intake: Unable to assess  · Oral Nutrition Supplement (ONS) Orders: None  · Anthropometric Measures:  · Ht: 5' 4\" (162.6 cm)   · Current Body Wt: 108 lb (49 kg)(? accuracy)  · Admission Body Wt: 108 lb

## 2020-04-21 NOTE — CONSULTS
Breath Sounds,   CARDIOVASCULAR:  Normal apical impulse, regular rate and rhythm, normal S1 and S2, no S3 or S4, and has no  murmur   ABDOMEN:  No scars, normal bowel sounds, soft, non-distended, non-tender, no masses palpated, no hepatolienomegaly  Musculoskeletal: Normal  Extremities: Normal, peripheral pulses normal, , has no edema   NEUROLOGIC:  Awake, alert, oriented to name, place and time. Cranial nerves II-XII are grossly intact. Motor is  intact. Sensory neuropathy,  and gait is abnormal.    DATA:    CBC:   Recent Labs     04/20/20  0811   WBC 17.2*   HGB 18.3*       CMP:  Recent Labs     04/20/20  0811 04/20/20  1646 04/20/20  2106   * 139 136   K 4.6 4.6 3.8   CL 96* 111* 110   CO2 4* 3* 8*   BUN 8 9 8   CREATININE 0.6 0.5* 0.4*   CALCIUM 9.8 9.3 9.1   PROT 8.9*  --   --    LABALBU 4.8  --   --    BILITOT 0.5  --   --    ALKPHOS 86  --   --    AST 9*  --   --    ALT 6*  --   --      Lipids:   Lab Results   Component Value Date    CHOL 205 12/11/2017    HDL 40 09/04/2019    TRIG 164 12/11/2017     Glucose:   Recent Labs     04/20/20  1805 04/20/20  2031 04/20/20  2228   POCGLU 179* 210* 238*     Hemoglobin A1C:   Lab Results   Component Value Date    LABA1C 13.3 04/20/2020     Free T4: No results found for: T4FREE  Free T3: No results found for: FT3  TSH High Sensitivity: No results found for: [facility]    Xr Chest Portable    Result Date: 4/20/2020  EXAMINATION: ONE XRAY VIEW OF THE CHEST 4/20/2020 9:34 am COMPARISON: 03/13/2020. HISTORY: ORDERING SYSTEM PROVIDED HISTORY: pain or SO  1. No active pulmonary disease.        Scheduled Medicines   Medications:    enoxaparin  40 mg Subcutaneous Daily      Infusions:    insulin 4 Units/hr (04/20/20 2230)    dextrose      sodium chloride Stopped (04/20/20 1920)    dextrose 5 % and 0.45 % NaCl 150 mL/hr at 04/20/20 1921         IMPRESSION      Patient Active Problem List   Diagnosis    DKA (diabetic ketoacidoses) ([facility]    DM (diabetes mellitus)

## 2020-04-21 NOTE — PROGRESS NOTES
are noted. 1. No active pulmonary disease. Scheduled Medicines   Medications:    enoxaparin  40 mg Subcutaneous Daily    pantoprazole  40 mg Intravenous Daily      Infusions:    insulin 3 Units/hr (04/21/20 9801)    dextrose      sodium chloride Stopped (04/20/20 1920)    dextrose 5 % and 0.45 % NaCl 150 mL/hr at 04/21/20 0206         Objective:   Vitals: /79   Pulse 107   Temp 98.3 °F (36.8 °C) (Oral)   Resp 12   Ht 5' 4\" (1.626 m)   Wt 108 lb 3.2 oz (49.1 kg)   LMP 03/20/2020   SpO2 98%   BMI 18.57 kg/m²   General appearance: alert and cooperative with exam  Neck: no JVD or bruit  Thyroid : Normal lobes   Lungs: Has Vesicular Breath sounds   Heart:  regular rate and rhythm  Abdomen: soft, non-tender; bowel sounds normal; no masses,  no organomegaly  Musculoskeletal: Normal  Extremities: extremities normal, , no edema  Neurologic:  Awake, alert, oriented to name, place and time. Cranial nerves II-XII are grossly intact. Motor is  intact. Sensory i neuropathy. ,  and gait is normal.    Assessment:     Patient Active Problem List:      DKA (diabetic ketoacidoses) ([facility]    DM (diabetes mellitus) ([facility]    Proteinuria    Chronic kidney disease (CKD)            Plan:     1. Reviewed POC blood glucose . Labs and X ray results   2. Reviewed Current Medicines   3. If Feels better able to eat we will discontinue IV insulin drip   4. Will start on  meal/ Correction bolus Humalog/ Basal Lantus Insulin regime   5. Monitor tor Blood glucose frequently   6. Modified  the dose of Insulin/ other medicines as needed   7. Will follow     .      Cheryle Peppers, MD

## 2020-04-22 LAB
ANION GAP SERPL CALCULATED.3IONS-SCNC: 11 MMOL/L (ref 4–16)
BUN BLDV-MCNC: 7 MG/DL (ref 6–23)
CALCIUM SERPL-MCNC: 8.5 MG/DL (ref 8.3–10.6)
CHLORIDE BLD-SCNC: 108 MMOL/L (ref 99–110)
CO2: 14 MMOL/L (ref 21–32)
CREAT SERPL-MCNC: 0.4 MG/DL (ref 0.6–1.1)
GFR AFRICAN AMERICAN: >60 ML/MIN/1.73M2
GFR NON-AFRICAN AMERICAN: >60 ML/MIN/1.73M2
GLUCOSE BLD-MCNC: 143 MG/DL (ref 70–99)
GLUCOSE BLD-MCNC: 218 MG/DL (ref 70–99)
GLUCOSE BLD-MCNC: 254 MG/DL (ref 70–99)
GLUCOSE BLD-MCNC: 261 MG/DL (ref 70–99)
GLUCOSE BLD-MCNC: 302 MG/DL (ref 70–99)
GLUCOSE BLD-MCNC: 95 MG/DL (ref 70–99)
HCT VFR BLD CALC: 41.1 % (ref 37–47)
HEMOGLOBIN: 13.7 GM/DL (ref 12.5–16)
MCH RBC QN AUTO: 29.5 PG (ref 27–31)
MCHC RBC AUTO-ENTMCNC: 33.3 % (ref 32–36)
MCV RBC AUTO: 88.6 FL (ref 78–100)
PDW BLD-RTO: 11.9 % (ref 11.7–14.9)
PLATELET # BLD: 259 K/CU MM (ref 140–440)
PMV BLD AUTO: 10.9 FL (ref 7.5–11.1)
POTASSIUM SERPL-SCNC: 3.5 MMOL/L (ref 3.5–5.1)
RBC # BLD: 4.64 M/CU MM (ref 4.2–5.4)
SODIUM BLD-SCNC: 133 MMOL/L (ref 135–145)
WBC # BLD: 12 K/CU MM (ref 4–10.5)

## 2020-04-22 PROCEDURE — 36415 COLL VENOUS BLD VENIPUNCTURE: CPT

## 2020-04-22 PROCEDURE — 6360000002 HC RX W HCPCS: Performed by: HOSPITALIST

## 2020-04-22 PROCEDURE — 82962 GLUCOSE BLOOD TEST: CPT

## 2020-04-22 PROCEDURE — 80048 BASIC METABOLIC PNL TOTAL CA: CPT

## 2020-04-22 PROCEDURE — 2580000003 HC RX 258: Performed by: INTERNAL MEDICINE

## 2020-04-22 PROCEDURE — C9113 INJ PANTOPRAZOLE SODIUM, VIA: HCPCS | Performed by: INTERNAL MEDICINE

## 2020-04-22 PROCEDURE — 6360000002 HC RX W HCPCS: Performed by: INTERNAL MEDICINE

## 2020-04-22 PROCEDURE — 6370000000 HC RX 637 (ALT 250 FOR IP): Performed by: INTERNAL MEDICINE

## 2020-04-22 PROCEDURE — 85027 COMPLETE CBC AUTOMATED: CPT

## 2020-04-22 PROCEDURE — 94761 N-INVAS EAR/PLS OXIMETRY MLT: CPT

## 2020-04-22 PROCEDURE — 1200000000 HC SEMI PRIVATE

## 2020-04-22 PROCEDURE — 2580000003 HC RX 258: Performed by: HOSPITALIST

## 2020-04-22 RX ADMIN — INSULIN LISPRO 15 UNITS: 100 INJECTION, SOLUTION INTRAVENOUS; SUBCUTANEOUS at 09:50

## 2020-04-22 RX ADMIN — INSULIN GLARGINE 35 UNITS: 100 INJECTION, SOLUTION SUBCUTANEOUS at 21:17

## 2020-04-22 RX ADMIN — SODIUM CHLORIDE 1000 ML: 9 INJECTION, SOLUTION INTRAVENOUS at 03:29

## 2020-04-22 RX ADMIN — INSULIN LISPRO 15 UNITS: 100 INJECTION, SOLUTION INTRAVENOUS; SUBCUTANEOUS at 13:10

## 2020-04-22 RX ADMIN — CEFTRIAXONE SODIUM 1 G: 1 INJECTION, POWDER, FOR SOLUTION INTRAMUSCULAR; INTRAVENOUS at 09:41

## 2020-04-22 RX ADMIN — PANTOPRAZOLE SODIUM 40 MG: 40 INJECTION, POWDER, FOR SOLUTION INTRAVENOUS at 09:40

## 2020-04-22 RX ADMIN — ENOXAPARIN SODIUM 40 MG: 40 INJECTION SUBCUTANEOUS at 09:40

## 2020-04-22 NOTE — PROGRESS NOTES
Roxana Rivera MD        sodium phosphate 10 mmol in dextrose 5 % 250 mL IVPB  10 mmol Intravenous PRN Roxana Rivera MD        Or    sodium phosphate 15 mmol in dextrose 5 % 250 mL IVPB  15 mmol Intravenous PRN Roxana Rivera MD        Or    sodium phosphate 20 mmol in dextrose 5 % 500 mL IVPB  20 mmol Intravenous PRN Roxana Rivera MD   Stopped at 04/21/20 1604    dextrose 5 % and 0.45 % sodium chloride infusion   Intravenous Continuous PRN Leann Garcia  mL/hr at 04/21/20 0926      ondansetron (ZOFRAN) injection 4 mg  4 mg Intravenous Q6H PRN Racheal Guillen PA-C   4 mg at 04/20/20 2056    pantoprazole (PROTONIX) injection 40 mg  40 mg Intravenous Daily Lilian Quiroz MD   40 mg at 04/21/20 0805     No Known Allergies  Active Problems:    DKA, type 1, not at goal [facility])  Resolved Problems:    * No resolved hospital problems. *    Blood pressure (!) 110/58, pulse 113, temperature 97.9 °F (36.6 °C), temperature source Oral, resp. rate 17, height 5' 4\" (1.626 m), weight 116 lb (52.6 kg), last menstrual period 03/20/2020, SpO2 98 %. Subjective:  Symptoms:  Stable. Pain:  She reports no pain. Objective:  General Appearance:  Comfortable. Vital signs: (most recent): Blood pressure (!) 110/58, pulse 113, temperature 97.9 °F (36.6 °C), temperature source Oral, resp. rate 17, height 5' 4\" (1.626 m), weight 116 lb (52.6 kg), last menstrual period 03/20/2020, SpO2 98 %. Vital signs are normal.    HEENT: Normal HEENT exam.    Lungs:  Normal effort. Heart: Normal rate. Abdomen: Abdomen is soft. Bowel sounds are normal.     Extremities: Decreased range of motion. Neurological: Patient is alert. Pupils:  Pupils are equal, round, and reactive to light. Skin:  Warm.       Assessment & Plan  DKA  -IVF, IV insulin drip off and on SSI with long acting insulin  Leukocytosis(decreasing)  -bld cx pending, UA neg  -CXR neg  -place on rocephin  hypophos and hypokalemia  -replacement

## 2020-04-22 NOTE — PLAN OF CARE
Problem: Falls - Risk of:  Goal: Will remain free from falls  Description: Will remain free from falls  4/22/2020 1604 by Judi Jones RN  Outcome: Ongoing  4/22/2020 0305 by Jeff Thomas RN  Outcome: Ongoing  Goal: Absence of physical injury  Description: Absence of physical injury  4/22/2020 1604 by Judi Jones RN  Outcome: Ongoing  4/22/2020 0305 by Jeff Thomas RN  Outcome: Ongoing     Problem: Discharge Planning:  Goal: Discharged to appropriate level of care  Description: Discharged to appropriate level of care  4/22/2020 1604 by Judi Jones RN  Outcome: Ongoing  4/22/2020 0305 by Jeff Thomas RN  Outcome: Ongoing     Problem: Nutrition  Goal: Optimal nutrition therapy  4/22/2020 1604 by Judi Jones RN  Outcome: Ongoing  4/22/2020 0305 by Jeff Thomas RN  Outcome: Ongoing     Problem: Fluid Volume - Imbalance:  Goal: Will remain free of signs and symptoms of dehydration  Description: Will remain free of signs and symptoms of dehydration  4/22/2020 1604 by Judi Jones RN  Outcome: Ongoing  4/22/2020 0305 by Jeff Thomas RN  Outcome: Ongoing  Goal: Absence of imbalanced fluid volume signs and symptoms  Description: Absence of imbalanced fluid volume signs and symptoms  4/22/2020 1604 by Judi Jones RN  Outcome: Ongoing  4/22/2020 0305 by Jeff Thoams RN  Outcome: Ongoing     Problem: Serum Glucose Level - Abnormal:  Goal: Ability to maintain appropriate glucose levels will improve  Description: Ability to maintain appropriate glucose levels will improve  4/22/2020 1604 by Judi Jones RN  Outcome: Ongoing  4/22/2020 0305 by Jeff Thomas RN  Outcome: Ongoing     Problem: Injury - Acid Base Imbalance:  Goal: Acid-base balance  Description: Acid-base balance  4/22/2020 1604 by Judi Jones RN  Outcome: Ongoing  4/22/2020 0305 by Jeff Thomas RN  Outcome: Ongoing

## 2020-04-23 LAB
GLUCOSE BLD-MCNC: 191 MG/DL (ref 70–99)
GLUCOSE BLD-MCNC: 215 MG/DL (ref 70–99)
GLUCOSE BLD-MCNC: 243 MG/DL (ref 70–99)
GLUCOSE BLD-MCNC: 285 MG/DL (ref 70–99)
GLUCOSE BLD-MCNC: 89 MG/DL (ref 70–99)

## 2020-04-23 PROCEDURE — 6370000000 HC RX 637 (ALT 250 FOR IP): Performed by: INTERNAL MEDICINE

## 2020-04-23 PROCEDURE — 6360000002 HC RX W HCPCS: Performed by: HOSPITALIST

## 2020-04-23 PROCEDURE — C9113 INJ PANTOPRAZOLE SODIUM, VIA: HCPCS | Performed by: INTERNAL MEDICINE

## 2020-04-23 PROCEDURE — 2580000003 HC RX 258: Performed by: HOSPITALIST

## 2020-04-23 PROCEDURE — 82962 GLUCOSE BLOOD TEST: CPT

## 2020-04-23 PROCEDURE — 6360000002 HC RX W HCPCS: Performed by: INTERNAL MEDICINE

## 2020-04-23 PROCEDURE — 1200000000 HC SEMI PRIVATE

## 2020-04-23 PROCEDURE — 94761 N-INVAS EAR/PLS OXIMETRY MLT: CPT

## 2020-04-23 RX ORDER — INSULIN GLARGINE 100 [IU]/ML
40 INJECTION, SOLUTION SUBCUTANEOUS NIGHTLY
Status: DISCONTINUED | OUTPATIENT
Start: 2020-04-23 | End: 2020-04-24 | Stop reason: HOSPADM

## 2020-04-23 RX ADMIN — CEFTRIAXONE SODIUM 1 G: 1 INJECTION, POWDER, FOR SOLUTION INTRAMUSCULAR; INTRAVENOUS at 08:44

## 2020-04-23 RX ADMIN — INSULIN GLARGINE 40 UNITS: 100 INJECTION, SOLUTION SUBCUTANEOUS at 21:17

## 2020-04-23 RX ADMIN — INSULIN LISPRO 15 UNITS: 100 INJECTION, SOLUTION INTRAVENOUS; SUBCUTANEOUS at 12:58

## 2020-04-23 RX ADMIN — INSULIN LISPRO 15 UNITS: 100 INJECTION, SOLUTION INTRAVENOUS; SUBCUTANEOUS at 09:16

## 2020-04-23 RX ADMIN — PANTOPRAZOLE SODIUM 40 MG: 40 INJECTION, POWDER, FOR SOLUTION INTRAVENOUS at 09:37

## 2020-04-23 ASSESSMENT — PAIN SCALES - GENERAL: PAINLEVEL_OUTOF10: 0

## 2020-04-23 NOTE — PROGRESS NOTES
Progress Note( Dr. Jose Rushing)  4/22/2020  Subjective:   Admit Date: 4/20/2020  PCP: Lilian Quiroz MD    Admitted For : Nausea vomiting and DKA    Consulted For: Better control of blood glucose    Interval History: Feels somewhat better still nauseated      Denies any chest pains,   Denies SOB . Some nausea or vomiting. No new bowel or bladder symptoms. Intake/Output Summary (Last 24 hours) at 4/22/2020 2313  Last data filed at 4/22/2020 1311  Gross per 24 hour   Intake 240 ml   Output --   Net 240 ml       DATA    CBC:   Recent Labs     04/20/20  0811 04/21/20  0407 04/22/20  0333   WBC 17.2* 20.9* 12.0*   HGB 18.3* 15.0 13.7    305 259    CMP:  Recent Labs     04/20/20  0811  04/21/20  0715 04/21/20  1411 04/22/20  0333   *   < > 135 131* 133*   K 4.6   < > 3.4* 3.8 3.5   CL 96*   < > 111* 106 108   CO2 4*   < > 12* 11* 14*   BUN 8   < > 9 7 7   CREATININE 0.6   < > 0.4* 0.4* 0.4*   CALCIUM 9.8   < > 9.1 9.1 8.5   PROT 8.9*  --   --   --   --    LABALBU 4.8  --   --   --   --    BILITOT 0.5  --   --   --   --    ALKPHOS 86  --   --   --   --    AST 9*  --   --   --   --    ALT 6*  --   --   --   --     < > = values in this interval not displayed. Lipids:   Lab Results   Component Value Date    CHOL 205 12/11/2017    HDL 40 09/04/2019    TRIG 164 12/11/2017     Glucose:  Recent Labs     04/22/20  1201 04/22/20  1707 04/22/20  2114   POCGLU 143* 95 302*     [id number]:  Lab Results   Component Value Date    LABA1C 13.3 04/20/2020     High Sensitivity TSH: No results found for: TSHHS  Free T3: No results found for: FT3  Free T4:No results found for: T4FREE    Xr Chest Portable    Result Date: 4/20/2020  EXAMINATION: ONE XRAY VIEW OF THE CHEST 4/20/2020 9:34 am COMPARISON: 03/13/2020. HISTORY: ORDERING SYSTEM PROVIDED HISTORY: pain or SOB TECHNOLOGIST PROVIDED HISTORY: Reason for exam:->pain or SOB Acuity: Acute Type of Exam: Initial FINDINGS: The heart size is within normal limits.  The pulmonary vasculature is also within normal limits. No acute infiltrates are seen. The costophrenic angles are sharp bilaterally. No pneumothoraces are noted. 1. No active pulmonary disease. Scheduled Medicines   Medications:    cefTRIAXone (ROCEPHIN) IV  1 g Intravenous Q24H    insulin lispro  15 Units Subcutaneous TID WC    insulin glargine  35 Units Subcutaneous Nightly    insulin lispro  0-6 Units Subcutaneous TID WC    insulin lispro  0-3 Units Subcutaneous 2 times per day    enoxaparin  40 mg Subcutaneous Daily    pantoprazole  40 mg Intravenous Daily      Infusions:    dextrose           Objective:   Vitals: /73   Pulse 101   Temp 97.9 °F (36.6 °C) (Oral)   Resp 16   Ht 5' 4\" (1.626 m)   Wt 116 lb (52.6 kg)   LMP 03/20/2020   SpO2 97%   BMI 19.91 kg/m²   General appearance: alert and cooperative with exam  Neck: no JVD or bruit  Thyroid : Normal lobes   Lungs: Has Vesicular Breath sounds   Heart:  regular rate and rhythm  Abdomen: soft, non-tender; bowel sounds normal; no masses,  no organomegaly  Musculoskeletal: Normal  Extremities: extremities normal, , no edema  Neurologic:  Awake, alert, oriented to name, place and time. Cranial nerves II-XII are grossly intact. Motor is  intact. Sensory i neuropathy. ,  and gait is normal.    Assessment:     Patient Active Problem List:      DKA (diabetic ketoacidoses) ([facility]    DM (diabetes mellitus) ([facility]    Proteinuria    Chronic kidney disease (CKD)            Plan:     1. Reviewed POC blood glucose . Labs and X ray results   2. Reviewed Current Medicines   3. On  meal/ Correction bolus Humalog/ Basal Lantus Insulin regime   4. Monitor tor Blood glucose frequently   5. Modified  the dose of Insulin/ other medicines as needed   6. Will follow     .      Jesus Monk MD

## 2020-04-23 NOTE — PROGRESS NOTES
Progress Note( Dr. Jayshree Ortega)  4/23/2020  Subjective:   Admit Date: 4/20/2020  PCP: Alisha Amaya MD    Admitted For : Nausea vomiting and DKA    Consulted For: Better control of blood glucose    Interval History: Feels somewhat better still nauseated      Denies any chest pains,   Denies SOB . Some nausea or vomiting. No new bowel or bladder symptoms. Intake/Output Summary (Last 24 hours) at 4/23/2020 0755  Last data filed at 4/22/2020 1311  Gross per 24 hour   Intake 240 ml   Output --   Net 240 ml       DATA    CBC:   Recent Labs     04/20/20  0811 04/21/20  0407 04/22/20  0333   WBC 17.2* 20.9* 12.0*   HGB 18.3* 15.0 13.7    305 259    CMP:  Recent Labs     04/20/20  0811  04/21/20  0715 04/21/20  1411 04/22/20  0333   *   < > 135 131* 133*   K 4.6   < > 3.4* 3.8 3.5   CL 96*   < > 111* 106 108   CO2 4*   < > 12* 11* 14*   BUN 8   < > 9 7 7   CREATININE 0.6   < > 0.4* 0.4* 0.4*   CALCIUM 9.8   < > 9.1 9.1 8.5   PROT 8.9*  --   --   --   --    LABALBU 4.8  --   --   --   --    BILITOT 0.5  --   --   --   --    ALKPHOS 86  --   --   --   --    AST 9*  --   --   --   --    ALT 6*  --   --   --   --     < > = values in this interval not displayed. Lipids:   Lab Results   Component Value Date    CHOL 205 12/11/2017    HDL 40 09/04/2019    TRIG 164 12/11/2017     Glucose:  Recent Labs     04/22/20  1707 04/22/20  2114 04/23/20  0227   POCGLU 95 302* 285*     [id number]:  Lab Results   Component Value Date    LABA1C 13.3 04/20/2020     High Sensitivity TSH: No results found for: TSHHS  Free T3: No results found for: FT3  Free T4:No results found for: T4FREE    Xr Chest Portable    Result Date: 4/20/2020  EXAMINATION: ONE XRAY VIEW OF THE CHEST 4/20/2020 9:34 am COMPARISON: 03/13/2020. HISTORY: ORDERING SYSTEM PROVIDED HISTORY: pain or SOB TECHNOLOGIST PROVIDED HISTORY: Reason for exam:->pain or SOB Acuity: Acute Type of Exam: Initial FINDINGS: The heart size is within normal limits.  The

## 2020-04-23 NOTE — PROGRESS NOTES
Hospitalist Progress Note      Name:  Misty Beaver /Age/Sex: 1995  (30 y.o. female)   MRN & CSN:  [id number] & [id number] Admission Date/Time: 2020  8:00 AM   Location:  [address] [place] [address] PCP: Peg Chopra MD         Hospital Day: 4    Assessment and Plan:   Misty Beaver is a 30 y.o.  female  who presents with:     DKA, uncontrolled type 2. Required insulin gtt initially then SSI prior to discharge. A1C 13.   Mild PCM   Hyponatremia, poor po intake   hypophos and hypokalemia, resolved after iv treatment    Oral intake low  Discussed BS adherence  RN to have her do her BS checks and insulin herself  DM educator consulted    Diet DIET CARB CONTROL; Dietary Nutrition Supplements: Diabetic Oral Supplement   DVT Prophylaxis [] Lovenox, []  Heparin, [] SCDs, []No VTE prophylaxis, patient ambulating   GI Prophylaxis [] PPI, [] H2 Blocker, [] No GI prophylaxis, patient is receiving diet/Tube Feeds   Code Status Full Code   Disposition Patient requires continued admission due to hyperglycemia    Dc to home when able   MDM [] Low, [x] Moderate,[]  High  Patient's risk as above due to     [x] One or more chronic illnesses with mild exacerbation progression    [] Two or more stable chronic illnesses    [] Undiagnosed new problem with uncertain prognosis    [] Elective major surgery    []Prescription drug management     History of Present Illness:     Pt S&E. Sitting up at edge of bed  Denies n/v and is eating her breakfast  Discussed goals after discharge  She understands how important it is to control her diabetes and the long term risks if she doesn't    10-14 point ROS reviewed negative, unless as noted above    Objective:        Intake/Output Summary (Last 24 hours) at 2020 0837  Last data filed at 2020 1311  Gross per 24 hour   Intake 240 ml   Output --   Net 240 ml      Vitals:   Vitals:    20 0610   BP: 122/73   Pulse: 96   Resp: 16   Temp: 97.9 °F (36.6 °C)   SpO2: 99%

## 2020-04-24 VITALS
TEMPERATURE: 97.9 F | HEIGHT: 64 IN | SYSTOLIC BLOOD PRESSURE: 116 MMHG | DIASTOLIC BLOOD PRESSURE: 75 MMHG | OXYGEN SATURATION: 95 % | RESPIRATION RATE: 16 BRPM | HEART RATE: 92 BPM | BODY MASS INDEX: 21.34 KG/M2 | WEIGHT: 125 LBS

## 2020-04-24 LAB
GLUCOSE BLD-MCNC: 198 MG/DL (ref 70–99)
GLUCOSE BLD-MCNC: 297 MG/DL (ref 70–99)
GLUCOSE BLD-MCNC: 96 MG/DL (ref 70–99)

## 2020-04-24 PROCEDURE — 6360000002 HC RX W HCPCS: Performed by: INTERNAL MEDICINE

## 2020-04-24 PROCEDURE — C9113 INJ PANTOPRAZOLE SODIUM, VIA: HCPCS | Performed by: INTERNAL MEDICINE

## 2020-04-24 PROCEDURE — 6360000002 HC RX W HCPCS: Performed by: HOSPITALIST

## 2020-04-24 PROCEDURE — 2580000003 HC RX 258: Performed by: HOSPITALIST

## 2020-04-24 PROCEDURE — 94761 N-INVAS EAR/PLS OXIMETRY MLT: CPT

## 2020-04-24 PROCEDURE — 82962 GLUCOSE BLOOD TEST: CPT

## 2020-04-24 RX ORDER — INSULIN GLARGINE 100 [IU]/ML
40 INJECTION, SOLUTION SUBCUTANEOUS NIGHTLY
Qty: 1 VIAL | Refills: 0 | Status: SHIPPED | OUTPATIENT
Start: 2020-04-24 | End: 2021-11-30 | Stop reason: SDUPTHER

## 2020-04-24 RX ADMIN — INSULIN LISPRO 15 UNITS: 100 INJECTION, SOLUTION INTRAVENOUS; SUBCUTANEOUS at 13:23

## 2020-04-24 RX ADMIN — CEFTRIAXONE SODIUM 1 G: 1 INJECTION, POWDER, FOR SOLUTION INTRAMUSCULAR; INTRAVENOUS at 08:06

## 2020-04-24 RX ADMIN — ENOXAPARIN SODIUM 40 MG: 40 INJECTION SUBCUTANEOUS at 08:06

## 2020-04-24 RX ADMIN — PANTOPRAZOLE SODIUM 40 MG: 40 INJECTION, POWDER, FOR SOLUTION INTRAVENOUS at 08:06

## 2020-04-25 LAB
CULTURE: NORMAL
CULTURE: NORMAL
Lab: NORMAL
Lab: NORMAL
SPECIMEN: NORMAL
SPECIMEN: NORMAL

## 2020-04-28 LAB
EKG ATRIAL RATE: 125 BPM
EKG DIAGNOSIS: NORMAL
EKG P AXIS: 81 DEGREES
EKG P-R INTERVAL: 96 MS
EKG Q-T INTERVAL: 326 MS
EKG QRS DURATION: 84 MS
EKG QTC CALCULATION (BAZETT): 470 MS
EKG R AXIS: 80 DEGREES
EKG T AXIS: 27 DEGREES
EKG VENTRICULAR RATE: 125 BPM

## 2020-06-23 ENCOUNTER — APPOINTMENT (OUTPATIENT)
Dept: GENERAL RADIOLOGY | Age: 25
DRG: 639 | End: 2020-06-23
Payer: COMMERCIAL

## 2020-06-23 ENCOUNTER — HOSPITAL ENCOUNTER (INPATIENT)
Age: 25
LOS: 3 days | Discharge: HOME | DRG: 639 | End: 2020-06-26
Attending: EMERGENCY MEDICINE | Admitting: HOSPITALIST
Payer: COMMERCIAL

## 2020-06-23 LAB
ALBUMIN SERPL-MCNC: 4.9 GM/DL (ref 3.4–5)
ALCOHOL SCREEN SERUM: <0.01 %WT/VOL
ALP BLD-CCNC: 86 IU/L (ref 40–129)
ALT SERPL-CCNC: 6 U/L (ref 10–40)
AMPHETAMINES: NEGATIVE
ANION GAP SERPL CALCULATED.3IONS-SCNC: 25 MMOL/L (ref 4–16)
ANION GAP SERPL CALCULATED.3IONS-SCNC: 27 MMOL/L (ref 4–16)
ANION GAP SERPL CALCULATED.3IONS-SCNC: 36 MMOL/L (ref 4–16)
AST SERPL-CCNC: 10 IU/L (ref 15–37)
BACTERIA: NEGATIVE /HPF
BARBITURATE SCREEN URINE: NEGATIVE
BASE EXCESS: 29 (ref 0–2.4)
BASOPHILS ABSOLUTE: 0.1 K/CU MM
BASOPHILS RELATIVE PERCENT: 0.4 % (ref 0–1)
BENZODIAZEPINE SCREEN, URINE: NEGATIVE
BETA-HYDROXYBUTYRATE: >20.8 MG/DL (ref 0–3)
BILIRUB SERPL-MCNC: 0.4 MG/DL (ref 0–1)
BILIRUBIN URINE: NEGATIVE MG/DL
BLOOD, URINE: NEGATIVE
BUN BLDV-MCNC: 10 MG/DL (ref 6–23)
BUN BLDV-MCNC: 11 MG/DL (ref 6–23)
BUN BLDV-MCNC: 9 MG/DL (ref 6–23)
CALCIUM SERPL-MCNC: 10 MG/DL (ref 8.3–10.6)
CALCIUM SERPL-MCNC: 9.5 MG/DL (ref 8.3–10.6)
CALCIUM SERPL-MCNC: 9.6 MG/DL (ref 8.3–10.6)
CANNABINOID SCREEN URINE: NEGATIVE
CHLORIDE BLD-SCNC: 106 MMOL/L (ref 99–110)
CHLORIDE BLD-SCNC: 108 MMOL/L (ref 99–110)
CHLORIDE BLD-SCNC: 93 MMOL/L (ref 99–110)
CHP ED QC CHECK: YES
CLARITY: CLEAR
CO2: 2 MMOL/L (ref 21–32)
CO2: 3 MMOL/L (ref 21–32)
CO2: 6 MMOL/L (ref 21–32)
COCAINE METABOLITE: NEGATIVE
COLOR: ABNORMAL
COMMENT: ABNORMAL
CREAT SERPL-MCNC: 0.5 MG/DL (ref 0.6–1.1)
CREAT SERPL-MCNC: 0.5 MG/DL (ref 0.6–1.1)
CREAT SERPL-MCNC: 0.7 MG/DL (ref 0.6–1.1)
D DIMER: <200 NG/ML(DDU)
DIFFERENTIAL TYPE: ABNORMAL
EKG ATRIAL RATE: 130 BPM
EKG DIAGNOSIS: NORMAL
EKG P-R INTERVAL: 116 MS
EKG Q-T INTERVAL: 436 MS
EKG QRS DURATION: 126 MS
EKG QTC CALCULATION (BAZETT): 641 MS
EKG R AXIS: 85 DEGREES
EKG T AXIS: 262 DEGREES
EKG VENTRICULAR RATE: 130 BPM
EOSINOPHILS ABSOLUTE: 0 K/CU MM
EOSINOPHILS RELATIVE PERCENT: 0 % (ref 0–3)
GFR AFRICAN AMERICAN: >60 ML/MIN/1.73M2
GFR NON-AFRICAN AMERICAN: >60 ML/MIN/1.73M2
GLUCOSE BLD-MCNC: 221 MG/DL (ref 70–99)
GLUCOSE BLD-MCNC: 232 MG/DL (ref 70–99)
GLUCOSE BLD-MCNC: 255 MG/DL (ref 70–99)
GLUCOSE BLD-MCNC: 288 MG/DL (ref 70–99)
GLUCOSE BLD-MCNC: 307 MG/DL (ref 70–99)
GLUCOSE BLD-MCNC: 381 MG/DL (ref 70–99)
GLUCOSE BLD-MCNC: 394 MG/DL (ref 70–99)
GLUCOSE BLD-MCNC: 464 MG/DL (ref 70–99)
GLUCOSE BLD-MCNC: 516 MG/DL
GLUCOSE BLD-MCNC: 516 MG/DL (ref 70–99)
GLUCOSE BLD-MCNC: 520 MG/DL (ref 70–99)
GLUCOSE BLD-MCNC: 525 MG/DL (ref 70–99)
GLUCOSE BLD-MCNC: 541 MG/DL (ref 70–99)
GLUCOSE, URINE: >500 MG/DL
HCO3 VENOUS: 3.1 MMOL/L (ref 19–25)
HCT VFR BLD CALC: 59.2 % (ref 37–47)
HEMOGLOBIN: 18.9 GM/DL (ref 12.5–16)
IMMATURE NEUTROPHIL %: 0.7 % (ref 0–0.43)
KETONES, URINE: ABNORMAL MG/DL
LACTATE: 2.1 MMOL/L (ref 0.4–2)
LACTATE: 2.6 MMOL/L (ref 0.4–2)
LEUKOCYTE ESTERASE, URINE: NEGATIVE
LYMPHOCYTES ABSOLUTE: 2.3 K/CU MM
LYMPHOCYTES RELATIVE PERCENT: 10.6 % (ref 24–44)
MAGNESIUM: 2.1 MG/DL (ref 1.8–2.4)
MCH RBC QN AUTO: 29.9 PG (ref 27–31)
MCHC RBC AUTO-ENTMCNC: 31.9 % (ref 32–36)
MCV RBC AUTO: 93.7 FL (ref 78–100)
MONOCYTES ABSOLUTE: 0.5 K/CU MM
MONOCYTES RELATIVE PERCENT: 2.2 % (ref 0–4)
MUCUS: ABNORMAL HPF
NITRITE URINE, QUANTITATIVE: NEGATIVE
NUCLEATED RBC %: 0 %
O2 SAT, VEN: 93.4 % (ref 50–70)
OPIATES, URINE: NEGATIVE
OXYCODONE: NEGATIVE
PCO2, VEN: 16 MMHG (ref 38–52)
PDW BLD-RTO: 11 % (ref 11.7–14.9)
PH VENOUS: 6.9 (ref 7.32–7.42)
PH, URINE: 5 (ref 5–8)
PHENCYCLIDINE, URINE: NEGATIVE
PHOSPHORUS: 4.1 MG/DL (ref 2.5–4.9)
PLATELET # BLD: 331 K/CU MM (ref 140–440)
PMV BLD AUTO: 10.6 FL (ref 7.5–11.1)
PO2, VEN: 103 MMHG (ref 28–48)
POTASSIUM SERPL-SCNC: 4.3 MMOL/L (ref 3.5–5.1)
POTASSIUM SERPL-SCNC: 4.9 MMOL/L (ref 3.5–5.1)
POTASSIUM SERPL-SCNC: 5.4 MMOL/L (ref 3.5–5.1)
PRO-BNP: 21.61 PG/ML
PROTEIN UA: 30 MG/DL
RBC # BLD: 6.32 M/CU MM (ref 4.2–5.4)
RBC URINE: 1 /HPF (ref 0–6)
SARS-COV-2, NAAT: NOT DETECTED
SEGMENTED NEUTROPHILS ABSOLUTE COUNT: 18.4 K/CU MM
SEGMENTED NEUTROPHILS RELATIVE PERCENT: 86.1 % (ref 36–66)
SODIUM BLD-SCNC: 131 MMOL/L (ref 135–145)
SODIUM BLD-SCNC: 136 MMOL/L (ref 135–145)
SODIUM BLD-SCNC: 139 MMOL/L (ref 135–145)
SOURCE: NORMAL
SPECIFIC GRAVITY UA: 1.02 (ref 1–1.03)
SQUAMOUS EPITHELIAL: <1 /HPF
TOTAL IMMATURE NEUTOROPHIL: 0.15 K/CU MM
TOTAL NUCLEATED RBC: 0 K/CU MM
TOTAL PROTEIN: 9.3 GM/DL (ref 6.4–8.2)
TRICHOMONAS: ABNORMAL /HPF
TROPONIN T: <0.01 NG/ML
TROPONIN T: <0.01 NG/ML
UROBILINOGEN, URINE: NORMAL MG/DL (ref 0.2–1)
WBC # BLD: 21.4 K/CU MM (ref 4–10.5)
WBC UA: 3 /HPF (ref 0–5)

## 2020-06-23 PROCEDURE — 2709999900 HC NON-CHARGEABLE SUPPLY

## 2020-06-23 PROCEDURE — 93005 ELECTROCARDIOGRAM TRACING: CPT | Performed by: HOSPITALIST

## 2020-06-23 PROCEDURE — C1894 INTRO/SHEATH, NON-LASER: HCPCS

## 2020-06-23 PROCEDURE — G0480 DRUG TEST DEF 1-7 CLASSES: HCPCS

## 2020-06-23 PROCEDURE — 94761 N-INVAS EAR/PLS OXIMETRY MLT: CPT

## 2020-06-23 PROCEDURE — 84484 ASSAY OF TROPONIN QUANT: CPT

## 2020-06-23 PROCEDURE — 2500000003 HC RX 250 WO HCPCS: Performed by: HOSPITALIST

## 2020-06-23 PROCEDURE — 85379 FIBRIN DEGRADATION QUANT: CPT

## 2020-06-23 PROCEDURE — 85025 COMPLETE CBC W/AUTO DIFF WBC: CPT

## 2020-06-23 PROCEDURE — 80048 BASIC METABOLIC PNL TOTAL CA: CPT

## 2020-06-23 PROCEDURE — C1769 GUIDE WIRE: HCPCS

## 2020-06-23 PROCEDURE — 80307 DRUG TEST PRSMV CHEM ANLYZR: CPT

## 2020-06-23 PROCEDURE — 2580000003 HC RX 258: Performed by: INTERNAL MEDICINE

## 2020-06-23 PROCEDURE — 81001 URINALYSIS AUTO W/SCOPE: CPT

## 2020-06-23 PROCEDURE — 93458 L HRT ARTERY/VENTRICLE ANGIO: CPT

## 2020-06-23 PROCEDURE — 2500000003 HC RX 250 WO HCPCS: Performed by: EMERGENCY MEDICINE

## 2020-06-23 PROCEDURE — 82010 KETONE BODYS QUAN: CPT

## 2020-06-23 PROCEDURE — C1887 CATHETER, GUIDING: HCPCS

## 2020-06-23 PROCEDURE — B2111ZZ FLUOROSCOPY OF MULTIPLE CORONARY ARTERIES USING LOW OSMOLAR CONTRAST: ICD-10-PCS | Performed by: INTERNAL MEDICINE

## 2020-06-23 PROCEDURE — 83735 ASSAY OF MAGNESIUM: CPT

## 2020-06-23 PROCEDURE — 99291 CRITICAL CARE FIRST HOUR: CPT

## 2020-06-23 PROCEDURE — 6370000000 HC RX 637 (ALT 250 FOR IP): Performed by: INTERNAL MEDICINE

## 2020-06-23 PROCEDURE — 2500000003 HC RX 250 WO HCPCS: Performed by: INTERNAL MEDICINE

## 2020-06-23 PROCEDURE — 2700000000 HC OXYGEN THERAPY PER DAY

## 2020-06-23 PROCEDURE — 6370000000 HC RX 637 (ALT 250 FOR IP): Performed by: EMERGENCY MEDICINE

## 2020-06-23 PROCEDURE — 83880 ASSAY OF NATRIURETIC PEPTIDE: CPT

## 2020-06-23 PROCEDURE — 84100 ASSAY OF PHOSPHORUS: CPT

## 2020-06-23 PROCEDURE — 36415 COLL VENOUS BLD VENIPUNCTURE: CPT

## 2020-06-23 PROCEDURE — 99291 CRITICAL CARE FIRST HOUR: CPT | Performed by: INTERNAL MEDICINE

## 2020-06-23 PROCEDURE — 2000000000 HC ICU R&B

## 2020-06-23 PROCEDURE — U0002 COVID-19 LAB TEST NON-CDC: HCPCS

## 2020-06-23 PROCEDURE — 2580000003 HC RX 258: Performed by: EMERGENCY MEDICINE

## 2020-06-23 PROCEDURE — 93010 ELECTROCARDIOGRAM REPORT: CPT | Performed by: INTERNAL MEDICINE

## 2020-06-23 PROCEDURE — 93005 ELECTROCARDIOGRAM TRACING: CPT | Performed by: INTERNAL MEDICINE

## 2020-06-23 PROCEDURE — 2500000003 HC RX 250 WO HCPCS

## 2020-06-23 PROCEDURE — 93005 ELECTROCARDIOGRAM TRACING: CPT | Performed by: EMERGENCY MEDICINE

## 2020-06-23 PROCEDURE — 2580000003 HC RX 258: Performed by: HOSPITALIST

## 2020-06-23 PROCEDURE — 82962 GLUCOSE BLOOD TEST: CPT

## 2020-06-23 PROCEDURE — 6360000004 HC RX CONTRAST MEDICATION

## 2020-06-23 PROCEDURE — 71045 X-RAY EXAM CHEST 1 VIEW: CPT

## 2020-06-23 PROCEDURE — 6360000002 HC RX W HCPCS

## 2020-06-23 PROCEDURE — 82805 BLOOD GASES W/O2 SATURATION: CPT

## 2020-06-23 PROCEDURE — 83605 ASSAY OF LACTIC ACID: CPT

## 2020-06-23 PROCEDURE — 80053 COMPREHEN METABOLIC PANEL: CPT

## 2020-06-23 PROCEDURE — B2151ZZ FLUOROSCOPY OF LEFT HEART USING LOW OSMOLAR CONTRAST: ICD-10-PCS | Performed by: INTERNAL MEDICINE

## 2020-06-23 PROCEDURE — 93458 L HRT ARTERY/VENTRICLE ANGIO: CPT | Performed by: INTERNAL MEDICINE

## 2020-06-23 PROCEDURE — 6360000002 HC RX W HCPCS: Performed by: HOSPITALIST

## 2020-06-23 RX ORDER — 0.9 % SODIUM CHLORIDE 0.9 %
2000 INTRAVENOUS SOLUTION INTRAVENOUS ONCE
Status: COMPLETED | OUTPATIENT
Start: 2020-06-23 | End: 2020-06-23

## 2020-06-23 RX ORDER — SODIUM CHLORIDE 0.9 % (FLUSH) 0.9 %
10 SYRINGE (ML) INJECTION EVERY 12 HOURS SCHEDULED
Status: DISCONTINUED | OUTPATIENT
Start: 2020-06-23 | End: 2020-06-26 | Stop reason: HOSPADM

## 2020-06-23 RX ORDER — SODIUM CHLORIDE 0.9 % (FLUSH) 0.9 %
10 SYRINGE (ML) INJECTION PRN
Status: DISCONTINUED | OUTPATIENT
Start: 2020-06-23 | End: 2020-06-26 | Stop reason: HOSPADM

## 2020-06-23 RX ORDER — DILTIAZEM HYDROCHLORIDE 5 MG/ML
10 INJECTION INTRAVENOUS ONCE
Status: DISCONTINUED | OUTPATIENT
Start: 2020-06-23 | End: 2020-06-23

## 2020-06-23 RX ORDER — ONDANSETRON 2 MG/ML
4 INJECTION INTRAMUSCULAR; INTRAVENOUS EVERY 6 HOURS PRN
Status: DISCONTINUED | OUTPATIENT
Start: 2020-06-23 | End: 2020-06-26 | Stop reason: HOSPADM

## 2020-06-23 RX ORDER — NICOTINE POLACRILEX 4 MG
15 LOZENGE BUCCAL PRN
Status: DISCONTINUED | OUTPATIENT
Start: 2020-06-23 | End: 2020-06-23

## 2020-06-23 RX ORDER — PREGABALIN 150 MG/1
150 CAPSULE ORAL 3 TIMES DAILY
Status: ON HOLD | COMMUNITY
End: 2020-06-26 | Stop reason: SDUPTHER

## 2020-06-23 RX ORDER — ACETAMINOPHEN 325 MG/1
650 TABLET ORAL EVERY 4 HOURS PRN
Status: DISCONTINUED | OUTPATIENT
Start: 2020-06-23 | End: 2020-06-26 | Stop reason: HOSPADM

## 2020-06-23 RX ORDER — DEXTROSE MONOHYDRATE 25 G/50ML
12.5 INJECTION, SOLUTION INTRAVENOUS PRN
Status: DISCONTINUED | OUTPATIENT
Start: 2020-06-23 | End: 2020-06-24

## 2020-06-23 RX ORDER — DEXTROSE AND SODIUM CHLORIDE 5; .45 G/100ML; G/100ML
INJECTION, SOLUTION INTRAVENOUS CONTINUOUS PRN
Status: DISCONTINUED | OUTPATIENT
Start: 2020-06-23 | End: 2020-06-25

## 2020-06-23 RX ORDER — METOPROLOL TARTRATE 5 MG/5ML
5 INJECTION INTRAVENOUS ONCE
Status: COMPLETED | OUTPATIENT
Start: 2020-06-23 | End: 2020-06-23

## 2020-06-23 RX ORDER — SODIUM CHLORIDE 9 MG/ML
INJECTION, SOLUTION INTRAVENOUS CONTINUOUS
Status: DISCONTINUED | OUTPATIENT
Start: 2020-06-23 | End: 2020-06-23

## 2020-06-23 RX ORDER — ASPIRIN 325 MG
325 TABLET ORAL DAILY
Status: DISCONTINUED | OUTPATIENT
Start: 2020-06-23 | End: 2020-06-26 | Stop reason: HOSPADM

## 2020-06-23 RX ORDER — MAGNESIUM SULFATE 1 G/100ML
1 INJECTION INTRAVENOUS PRN
Status: DISCONTINUED | OUTPATIENT
Start: 2020-06-23 | End: 2020-06-26 | Stop reason: HOSPADM

## 2020-06-23 RX ORDER — POTASSIUM CHLORIDE 7.45 MG/ML
10 INJECTION INTRAVENOUS PRN
Status: DISCONTINUED | OUTPATIENT
Start: 2020-06-23 | End: 2020-06-25

## 2020-06-23 RX ORDER — DEXTROSE MONOHYDRATE 25 G/50ML
12.5 INJECTION, SOLUTION INTRAVENOUS PRN
Status: DISCONTINUED | OUTPATIENT
Start: 2020-06-23 | End: 2020-06-23

## 2020-06-23 RX ORDER — DEXTROSE MONOHYDRATE 50 MG/ML
100 INJECTION, SOLUTION INTRAVENOUS PRN
Status: DISCONTINUED | OUTPATIENT
Start: 2020-06-23 | End: 2020-06-23

## 2020-06-23 RX ORDER — SODIUM CHLORIDE 450 MG/100ML
INJECTION, SOLUTION INTRAVENOUS CONTINUOUS
Status: DISCONTINUED | OUTPATIENT
Start: 2020-06-23 | End: 2020-06-23

## 2020-06-23 RX ADMIN — SODIUM CHLORIDE 6 UNITS/HR: 9 INJECTION, SOLUTION INTRAVENOUS at 17:08

## 2020-06-23 RX ADMIN — SODIUM CHLORIDE 0.1 UNITS/KG/HR: 9 INJECTION, SOLUTION INTRAVENOUS at 12:02

## 2020-06-23 RX ADMIN — ASPIRIN 325 MG ORAL TABLET 325 MG: 325 PILL ORAL at 19:21

## 2020-06-23 RX ADMIN — SODIUM BICARBONATE: 84 INJECTION, SOLUTION INTRAVENOUS at 13:56

## 2020-06-23 RX ADMIN — SODIUM CHLORIDE: 9 INJECTION, SOLUTION INTRAVENOUS at 13:56

## 2020-06-23 RX ADMIN — ENOXAPARIN SODIUM 40 MG: 40 INJECTION SUBCUTANEOUS at 13:58

## 2020-06-23 RX ADMIN — METOPROLOL TARTRATE 5 MG: 5 INJECTION INTRAVENOUS at 18:43

## 2020-06-23 RX ADMIN — DEXTROSE AND SODIUM CHLORIDE: 5; 450 INJECTION, SOLUTION INTRAVENOUS at 19:25

## 2020-06-23 RX ADMIN — SODIUM BICARBONATE 150 ML/HR: 84 INJECTION, SOLUTION INTRAVENOUS at 12:01

## 2020-06-23 RX ADMIN — SODIUM CHLORIDE, PRESERVATIVE FREE 10 ML: 5 INJECTION INTRAVENOUS at 21:36

## 2020-06-23 RX ADMIN — SODIUM CHLORIDE 2000 ML: 9 INJECTION, SOLUTION INTRAVENOUS at 08:33

## 2020-06-23 RX ADMIN — ONDANSETRON 4 MG: 2 INJECTION INTRAMUSCULAR; INTRAVENOUS at 13:58

## 2020-06-23 ASSESSMENT — PAIN SCALES - GENERAL
PAINLEVEL_OUTOF10: 0
PAINLEVEL_OUTOF10: 0

## 2020-06-23 NOTE — PROGRESS NOTES
Spoke with Dr. Sheryl Alston. Orders to prepare pt for heart cath. Orders to give aspirin now and complete another EKG. Orders placed. Pt updated on plan. Night RN notified of plan.  Elgin Siddiqi RN

## 2020-06-23 NOTE — PROGRESS NOTES
Pt to room 2117 via stretcher. Handoff report received from [facility]. Pt oriented to room and connected to monitors. Skin assessed with this RN and RN, Fiorella Posadas. No open wounds noted. Pt drowsy but oriented x4. RR elevated. SpO2 98% on 2 L NC. , bp 149/69. Dr. Yael Chang consulted. Waiting return response. Bed alarm set, call light within reach. Family updated on pt's status. Will continue to monitor.  Rudolph Medina RN

## 2020-06-23 NOTE — H&P
together: None     Attends Confucianism service: None     Active member of club or organization: None     Attends meetings of clubs or organizations: None     Relationship status: None    Intimate partner violence     Fear of current or ex partner: None     Emotionally abused: None     Physically abused: None     Forced sexual activity: None   Other Topics Concern    None   Social History Narrative    None       Medications:   Medications:   Prior to Admission medications    Medication Sig Start Date End Date Taking?  Authorizing Provider   insulin glargine (LANTUS) 100 UNIT/ML injection vial Inject 40 Units into the skin nightly 4/24/20   Jessie Search, DO   insulin lispro (HUMALOG) 100 UNIT/ML injection vial Inject 0-12 Units into the skin 3 times daily (with meals) 4/24/20   Jessie Search, DO   losartan (COZAAR) 25 MG tablet Take 1 tablet by mouth daily 12/3/19   Brian Griffin MD      Infusions:    dextrose      insulin       PRN Meds: glucose, 15 g, PRN  dextrose, 12.5 g, PRN  glucagon (rDNA), 1 mg, PRN  dextrose, 100 mL/hr, PRN          Electronically signed by Calderon Garcia MD on 6/23/2020 at 10:10 AM

## 2020-06-23 NOTE — PROGRESS NOTES
Lab notified of STAT D-Dimer draw. Sending phlebotomist. Dr. Loreatha Duverney also notified of lactic level of 2.6. Waiting return response.  Belem Dietz RN

## 2020-06-23 NOTE — PROGRESS NOTES
This RN contacted lab again in regards to blood draws. States will send someone ASAP.  Mario Dutta RN

## 2020-06-23 NOTE — PROGRESS NOTES
Lab contacted in regards to venous gas. States needs to be redrawn. [facility] will send phlebotomist ASAP.  Belem Dietz RN

## 2020-06-23 NOTE — PROGRESS NOTES
EKG complete. Results immediately paged to Dr. Gianna Garcia. No new orders at this time. Pt has no complaints of CP or pain in general. Will continue to monitor. Radha Bourgeois RN.

## 2020-06-23 NOTE — PLAN OF CARE
Problem: Falls - Risk of:  Goal: Will remain free from falls  Description: Will remain free from falls  Outcome: Met This Shift  Goal: Absence of physical injury  Description: Absence of physical injury  Outcome: Met This Shift     Problem: Discharge Planning:  Goal: Discharged to appropriate level of care  Description: Discharged to appropriate level of care  Outcome: Ongoing     Problem: Fluid Volume - Imbalance:  Goal: Will remain free of signs and symptoms of dehydration  Description: Will remain free of signs and symptoms of dehydration  Outcome: Ongoing  Goal: Absence of imbalanced fluid volume signs and symptoms  Description: Absence of imbalanced fluid volume signs and symptoms  Outcome: Ongoing     Problem: Serum Glucose Level - Abnormal:  Goal: Ability to maintain appropriate glucose levels will improve  Description: Ability to maintain appropriate glucose levels will improve  Outcome: Ongoing     Problem: Injury - Acid Base Imbalance:  Goal: Acid-base balance  Description: Acid-base balance  Outcome: Ongoing

## 2020-06-23 NOTE — ED TRIAGE NOTES
Patient to ER via ems for c/o sob and hyperglycemia. Patient states sob started yesterday, that she typically gets sob when her bg is high. Patient's bg is 520 on arrival to ed. Reports last dose of insulin being yesterday before work.  currently a/ox4

## 2020-06-23 NOTE — ED PROVIDER NOTES
Triage Chief Complaint:   Shortness of Breath and Hyperglycemia (bg for ems 527)    [place]:  Estela Townsend is a 30 y.o. female that presents with shortness of breath and concern for elevated blood sugar. Patient reports that she was doing well up until yesterday when the above started. Patient noticed her blood sugar was running a little high last night at \"230\". This morning she reports that her breathing rate had increased and she felt some shortness of breath which she has had in the past when her blood sugar is markedly elevated. EMS was called and patient was found to be hyperglycemic to 527. Patient reports adherence to some insulin but reports \"I like to take a little bit so that it does not drop\". Patient denies any recent illnesses other than some nausea and vomiting yesterday. No fevers. No coughing. Of note, patient was hospitalized twice this past spring for diabetic ketoacidosis. Patient is type I diabetic.     ROS:  General:  No fevers, no chills, + weakness, + fatigue  Eyes:  No recent vison changes, no discharge  ENT:  No sore throat, no nasal congestion, no hearing changes  Cardiovascular:  No chest pain, no palpitations  Respiratory:  + shortness of breath, no cough, no wheezing  Gastrointestinal:  No pain, + nausea, + vomiting, no diarrhea  Musculoskeletal:  No muscle pain, no joint pain  Skin:  No rash, no pruritis, no easy bruising  Neurologic:  No speech problems, no headache, no extremity numbness, no extremity tingling, no extremity weakness  Psychiatric:  No anxiety  Genitourinary:  No dysuria, no hematuria  Endocrine:  No unexpected weight gain, no unexpected weight loss  Extremities:  no edema, no pain    Past Medical History:   Diagnosis Date    Chronic kidney disease     Chronic kidney disease (CKD) stage G1/A1, glomerular filtration rate (GFR) equal to or greater than 90 mL/min/1.73 square meter and albuminuria creatinine ratio less than 30 mg/g 1/22/2018    Diabetes mellitus ([facility]     [facility]KA [facility]diabetic ketoacidoses) [facility]     DKA (diabetic ketoacidoses) ([facility]      History reviewed. No pertinent surgical history.   Family History   Problem Relation Age of Onset    Diabetes Mother     High Blood Pressure Mother     Kidney Disease Mother     Vision Loss Mother      Social History     Socioeconomic History    Marital status: Single     Spouse name: Not on file    Number of children: Not on file    Years of education: Not on file    Highest education level: Not on file   Occupational History    Not on file   Social Needs    Financial resource strain: Not on file    Food insecurity     Worry: Not on file     Inability: Not on file    Transportation needs     Medical: Not on file     Non-medical: Not on file   Tobacco Use    Smoking status: Never Smoker    Smokeless tobacco: Never Used   Substance and Sexual Activity    Alcohol use: No    Drug use: No    Sexual activity: Not on file   Lifestyle    Physical activity     Days per week: Not on file     Minutes per session: Not on file    Stress: Not on file   Relationships    Social connections     Talks on phone: Not on file     Gets together: Not on file     Attends Voodoo service: Not on file     Active member of club or organization: Not on file     Attends meetings of clubs or organizations: Not on file     Relationship status: Not on file    Intimate partner violence     Fear of current or ex partner: Not on file     Emotionally abused: Not on file     Physically abused: Not on file     Forced sexual activity: Not on file   Other Topics Concern    Not on file   Social History Narrative    Not on file     Current Facility-Administered Medications   Medication Dose Route Frequency Provider Last Rate Last Dose    0.9 % sodium chloride bolus  2,000 mL Intravenous Once Gavino Watson MD 1,000 mL/hr at 06/23/20 0833 2,000 mL at 06/23/20 0833    glucose (GLUTOSE) 40 % oral gel 15 g  15 g Oral PRN Gavino Watson MD  dextrose 50 % IV solution  12.5 g Intravenous PRN Ramandeep Sneed MD        glucagon (rDNA) injection 1 mg  1 mg Intramuscular PRN Ramandeep Sneed MD        dextrose 5 % solution  100 mL/hr Intravenous PRN Ramandeep Sneed MD        insulin regular (HUMULIN R;NOVOLIN R) 100 Units in sodium chloride 0.9 % 100 mL infusion  0.1 Units/kg/hr Intravenous Continuous Ramandeep Sneed MD        insulin lispro (HUMALOG) injection vial 15 Units  15 Units Subcutaneous Once Ramandeep Sneed MD         Current Outpatient Medications   Medication Sig Dispense Refill    insulin glargine (LANTUS) 100 UNIT/ML injection vial Inject 40 Units into the skin nightly 1 vial 0    insulin lispro (HUMALOG) 100 UNIT/ML injection vial Inject 0-12 Units into the skin 3 times daily (with meals) 1 vial 0    losartan (COZAAR) 25 MG tablet Take 1 tablet by mouth daily 30 tablet 5     No Known Allergies    Nursing Notes Reviewed    Physical Exam:  ED Triage Vitals [06/23/20 0801]   Enc Vitals Group      BP (!) 144/88      Pulse 132      Resp (!) 35      Temp 97.8 °F (36.6 °C)      Temp Source Oral      SpO2 100 %      Weight 125 lb (56.7 kg)      Height 5' 4\" (1.626 m)      Head Circumference       Peak Flow       Pain Score       Pain Loc       Pain Edu? Excl. in [address]? My pulse ox interpretation is - normal    General appearance: Appears chronically ill and very thin. Appears critically ill. Skin:  Warm. Dry. Eye:  Extraocular movements intact. Ears, nose, mouth and throat:   tacky mucous membranes. Neck:  Trachea midline. Extremity:  No swelling. Normal ROM     Heart: Tachycardic but regular, normal S1 & S2, no extra heart sounds. Perfusion:  Intact. Strong and symmetric bilateral radial and PT pulses. Respiratory: Tachypnea present with respiratory rate in the mid 30s. Smell of ketones on the breath. Speaking in short sentences with increased work of breathing. Abdominal:  Normal bowel sounds. Soft. Nontender.   Non (L) 38 - 52 mmHG    pO2, Michael 103 (H) 28 - 48 mmHG    Base Excess 29 (H) 0 - 2.4    HCO3, Venous 3.1 (L) 19 - 25 MMOL/L    O2 Sat, Michael 93.4 (H) 50 - 70 %    Comment VBG    Beta-Hydroxybutyrate   Result Value Ref Range    Beta-Hydroxybutyrate >20.8 (H) 0.0 - 3.0 MG/DL   ETOH Blood   Result Value Ref Range    Alcohol Scrn <0.01 <0.01 %WT/VOL   Troponin   Result Value Ref Range    Troponin T <0.010 <0.01 NG/ML   Brain Natriuretic Peptide   Result Value Ref Range    Pro-BNP 21.61 <300 PG/ML   POCT Glucose   Result Value Ref Range    POC Glucose 520 (H) 70 - 99 MG/DL      Radiographs (if obtained):  [] The following radiograph was interpreted by myself in the absence of a radiologist:   [x] Radiologist's Report Reviewed:  XR CHEST PORTABLE   Final Result   Unremarkable chest.               EKG (if obtained): (All EKG's are interpreted by myself in the absence of a cardiologist)  12 lead EKG per my interpretation:  Sinus Tachycardia at 130  Axis is   Normal  QTc is  prolonged  There is no specific T wave changes appreciated. There is no specific ST wave changes appreciated; nonspecific ST T scooping in the inferior leads. No STEMI    Prior EKG to compare with was available and sinus tachycardia seen on prior but the ST-T scooping was not present on that EKG when compared to today's. Chart review shows recent radiographs:  No results found. MDM:  Pt presents as above. Emergent conditions considered. Presentation prompted initial labs, EKG and imaging. IV was established and IV fluids were initiated. EKG with a sinus tachycardia as above. There are scooping ST segments in the inferior leads. CMP demonstrating severe metabolic acidosis with elevated anion gap consistent with diabetic ketoacidosis given patient's history and hyperglycemia. CBC with marked leukocytosis as well as likely hemoconcentration looking at labs. Beta hydroxybutyrate is undetectably high. VBG with marked acidemia.   BNP is not suggestive of volume overload. Troponin negative. Chest x-ray negative for acute cardiopulmonary process. Additional IV access was obtained. On reassessment the patient patient had not received much IV fluids at all she had her arms bent during early ED course. Blood sugar on recheck was essentially the same. Subcutaneous insulin at 15 units was ordered. Patient's heart rate is downtrending to 108 on recheck. Patient is critically ill and will need admission to the intensive care unit for the above. I did speak with the hospitalist and they are recommending sodium bicarb to be added to normal saline and I did call pharmacy for IV fluid builder and this will be infused. I discussed specific signs and symptoms on when to return to the emergency department as well as the need for close outpatient follow-up. Questions sought and answered with the patient. They voice understanding and agree with plan. CRITICAL CARE NOTE:  There was a high probability of clinically significant life-threatening deterioration of the patient's condition requiring my urgent intervention due to DKA. Insulin bolus and infusion management, IV fluid management, bicarb menstruation, consultation of pharmacy, telemetry monitoring, frequent reassessments and updating of the patient and chart review including most recent hospitalization was performed to address this. Total critical care time is AT LEAST 50 minutes. This includes vital sign monitoring, pulse oximetry monitoring, telemetry monitoring, clinical response to the IV medications, reviewing the nursing notes, consultation time, dictation/documentation time, and interpretation of the lab work. This time excludes time spent performing procedures and separately billable procedures and family discussion time. Clinical Impression:  1.  Type 1 diabetes mellitus with ketoacidosis, uncontrolled ([facility]      Disposition referral (if applicable):  No follow-up provider specified. Disposition medications (if applicable):  New Prescriptions    No medications on file       Comment: Please note this report has been produced using speech recognition software and may contain errors related to that system including errors in grammar, punctuation, and spelling, as well as words and phrases that may be inappropriate. If there are any questions or concerns please feel free to contact the dictating provider for clarification.        Josh Acevedo MD  06/23/20 1851

## 2020-06-23 NOTE — ED NOTES
Bed: ED-30  Expected date:   Expected time:   Means of arrival:   Comments:  EMS hold     Сергей Metz RN  06/23/20 6333

## 2020-06-24 PROBLEM — E43 SEVERE MALNUTRITION (HCC): Chronic | Status: ACTIVE | Noted: 2020-06-24

## 2020-06-24 LAB
ANION GAP SERPL CALCULATED.3IONS-SCNC: 11 MMOL/L (ref 4–16)
BUN BLDV-MCNC: 11 MG/DL (ref 6–23)
CALCIUM SERPL-MCNC: 9 MG/DL (ref 8.3–10.6)
CHLORIDE BLD-SCNC: 114 MMOL/L (ref 99–110)
CO2: 11 MMOL/L (ref 21–32)
CREAT SERPL-MCNC: 0.4 MG/DL (ref 0.6–1.1)
EKG ATRIAL RATE: 104 BPM
EKG ATRIAL RATE: 107 BPM
EKG ATRIAL RATE: 127 BPM
EKG DIAGNOSIS: NORMAL
EKG P AXIS: 73 DEGREES
EKG P AXIS: 75 DEGREES
EKG P AXIS: 76 DEGREES
EKG P-R INTERVAL: 100 MS
EKG Q-T INTERVAL: 356 MS
EKG Q-T INTERVAL: 362 MS
EKG Q-T INTERVAL: 370 MS
EKG QRS DURATION: 94 MS
EKG QRS DURATION: 94 MS
EKG QRS DURATION: 96 MS
EKG QTC CALCULATION (BAZETT): 483 MS
EKG QTC CALCULATION (BAZETT): 486 MS
EKG QTC CALCULATION (BAZETT): 517 MS
EKG R AXIS: 73 DEGREES
EKG R AXIS: 77 DEGREES
EKG R AXIS: 81 DEGREES
EKG T AXIS: 66 DEGREES
EKG T AXIS: 67 DEGREES
EKG T AXIS: 69 DEGREES
EKG VENTRICULAR RATE: 104 BPM
EKG VENTRICULAR RATE: 107 BPM
EKG VENTRICULAR RATE: 127 BPM
ESTIMATED AVERAGE GLUCOSE: 295 MG/DL
GFR AFRICAN AMERICAN: >60 ML/MIN/1.73M2
GFR NON-AFRICAN AMERICAN: >60 ML/MIN/1.73M2
GLUCOSE BLD-MCNC: 199 MG/DL (ref 70–99)
GLUCOSE BLD-MCNC: 209 MG/DL (ref 70–99)
GLUCOSE BLD-MCNC: 231 MG/DL (ref 70–99)
GLUCOSE BLD-MCNC: 231 MG/DL (ref 70–99)
GLUCOSE BLD-MCNC: 247 MG/DL (ref 70–99)
GLUCOSE BLD-MCNC: 252 MG/DL (ref 70–99)
GLUCOSE BLD-MCNC: 264 MG/DL (ref 70–99)
GLUCOSE BLD-MCNC: 330 MG/DL (ref 70–99)
HBA1C MFR BLD: 11.9 % (ref 4.2–6.3)
HCT VFR BLD CALC: 42.8 % (ref 37–47)
HEMOGLOBIN: 14.3 GM/DL (ref 12.5–16)
LACTATE: 1.1 MMOL/L (ref 0.4–2)
MAGNESIUM: 1.7 MG/DL (ref 1.8–2.4)
MCH RBC QN AUTO: 29.5 PG (ref 27–31)
MCHC RBC AUTO-ENTMCNC: 33.4 % (ref 32–36)
MCV RBC AUTO: 88.2 FL (ref 78–100)
PDW BLD-RTO: 11.4 % (ref 11.7–14.9)
PHOSPHORUS: 1.7 MG/DL (ref 2.5–4.9)
PLATELET # BLD: 253 K/CU MM (ref 140–440)
PMV BLD AUTO: 10.3 FL (ref 7.5–11.1)
POTASSIUM SERPL-SCNC: 3.5 MMOL/L (ref 3.5–5.1)
RBC # BLD: 4.85 M/CU MM (ref 4.2–5.4)
SODIUM BLD-SCNC: 136 MMOL/L (ref 135–145)
WBC # BLD: 22.9 K/CU MM (ref 4–10.5)

## 2020-06-24 PROCEDURE — 83735 ASSAY OF MAGNESIUM: CPT

## 2020-06-24 PROCEDURE — 6370000000 HC RX 637 (ALT 250 FOR IP): Performed by: INTERNAL MEDICINE

## 2020-06-24 PROCEDURE — 6360000002 HC RX W HCPCS: Performed by: INTERNAL MEDICINE

## 2020-06-24 PROCEDURE — 85027 COMPLETE CBC AUTOMATED: CPT

## 2020-06-24 PROCEDURE — 94761 N-INVAS EAR/PLS OXIMETRY MLT: CPT

## 2020-06-24 PROCEDURE — 83036 HEMOGLOBIN GLYCOSYLATED A1C: CPT

## 2020-06-24 PROCEDURE — 99232 SBSQ HOSP IP/OBS MODERATE 35: CPT | Performed by: INTERNAL MEDICINE

## 2020-06-24 PROCEDURE — 2500000003 HC RX 250 WO HCPCS: Performed by: INTERNAL MEDICINE

## 2020-06-24 PROCEDURE — 36415 COLL VENOUS BLD VENIPUNCTURE: CPT

## 2020-06-24 PROCEDURE — 82805 BLOOD GASES W/O2 SATURATION: CPT

## 2020-06-24 PROCEDURE — 93010 ELECTROCARDIOGRAM REPORT: CPT | Performed by: INTERNAL MEDICINE

## 2020-06-24 PROCEDURE — 6360000002 HC RX W HCPCS: Performed by: HOSPITALIST

## 2020-06-24 PROCEDURE — C9113 INJ PANTOPRAZOLE SODIUM, VIA: HCPCS | Performed by: INTERNAL MEDICINE

## 2020-06-24 PROCEDURE — 2000000000 HC ICU R&B

## 2020-06-24 PROCEDURE — 80048 BASIC METABOLIC PNL TOTAL CA: CPT

## 2020-06-24 PROCEDURE — 84100 ASSAY OF PHOSPHORUS: CPT

## 2020-06-24 PROCEDURE — 6370000000 HC RX 637 (ALT 250 FOR IP): Performed by: PHYSICIAN ASSISTANT

## 2020-06-24 PROCEDURE — 2580000003 HC RX 258: Performed by: HOSPITALIST

## 2020-06-24 PROCEDURE — 83605 ASSAY OF LACTIC ACID: CPT

## 2020-06-24 PROCEDURE — 2580000003 HC RX 258: Performed by: INTERNAL MEDICINE

## 2020-06-24 PROCEDURE — 87040 BLOOD CULTURE FOR BACTERIA: CPT

## 2020-06-24 RX ORDER — INSULIN GLARGINE 100 [IU]/ML
35 INJECTION, SOLUTION SUBCUTANEOUS NIGHTLY
Status: DISCONTINUED | OUTPATIENT
Start: 2020-06-24 | End: 2020-06-26

## 2020-06-24 RX ORDER — DEXTROSE MONOHYDRATE 50 MG/ML
100 INJECTION, SOLUTION INTRAVENOUS PRN
Status: DISCONTINUED | OUTPATIENT
Start: 2020-06-24 | End: 2020-06-26 | Stop reason: HOSPADM

## 2020-06-24 RX ORDER — SODIUM CHLORIDE 9 MG/ML
INJECTION, SOLUTION INTRAVENOUS CONTINUOUS
Status: DISCONTINUED | OUTPATIENT
Start: 2020-06-24 | End: 2020-06-26 | Stop reason: HOSPADM

## 2020-06-24 RX ORDER — POTASSIUM CHLORIDE 20 MEQ/1
40 TABLET, EXTENDED RELEASE ORAL ONCE
Status: COMPLETED | OUTPATIENT
Start: 2020-06-24 | End: 2020-06-24

## 2020-06-24 RX ORDER — LORAZEPAM 0.5 MG/1
0.5 TABLET ORAL ONCE
Status: COMPLETED | OUTPATIENT
Start: 2020-06-24 | End: 2020-06-24

## 2020-06-24 RX ORDER — DEXTROSE MONOHYDRATE 25 G/50ML
12.5 INJECTION, SOLUTION INTRAVENOUS PRN
Status: DISCONTINUED | OUTPATIENT
Start: 2020-06-24 | End: 2020-06-26 | Stop reason: HOSPADM

## 2020-06-24 RX ORDER — PANTOPRAZOLE SODIUM 40 MG/10ML
40 INJECTION, POWDER, LYOPHILIZED, FOR SOLUTION INTRAVENOUS DAILY
Status: DISCONTINUED | OUTPATIENT
Start: 2020-06-24 | End: 2020-06-25

## 2020-06-24 RX ORDER — MAGNESIUM SULFATE 1 G/100ML
1 INJECTION INTRAVENOUS ONCE
Status: COMPLETED | OUTPATIENT
Start: 2020-06-24 | End: 2020-06-24

## 2020-06-24 RX ORDER — NICOTINE POLACRILEX 4 MG
15 LOZENGE BUCCAL PRN
Status: DISCONTINUED | OUTPATIENT
Start: 2020-06-24 | End: 2020-06-26 | Stop reason: HOSPADM

## 2020-06-24 RX ADMIN — ENOXAPARIN SODIUM 40 MG: 40 INJECTION SUBCUTANEOUS at 22:10

## 2020-06-24 RX ADMIN — POTASSIUM CHLORIDE 40 MEQ: 1500 TABLET, EXTENDED RELEASE ORAL at 08:07

## 2020-06-24 RX ADMIN — POTASSIUM PHOSPHATE, MONOBASIC AND POTASSIUM PHOSPHATE, DIBASIC 15 MMOL: 224; 236 INJECTION, SOLUTION INTRAVENOUS at 08:08

## 2020-06-24 RX ADMIN — SODIUM CHLORIDE, PRESERVATIVE FREE 10 ML: 5 INJECTION INTRAVENOUS at 08:08

## 2020-06-24 RX ADMIN — SODIUM CHLORIDE: 9 INJECTION, SOLUTION INTRAVENOUS at 08:34

## 2020-06-24 RX ADMIN — ENOXAPARIN SODIUM 40 MG: 40 INJECTION SUBCUTANEOUS at 08:06

## 2020-06-24 RX ADMIN — DEXTROSE AND SODIUM CHLORIDE: 5; 450 INJECTION, SOLUTION INTRAVENOUS at 03:04

## 2020-06-24 RX ADMIN — MAGNESIUM SULFATE IN DEXTROSE 1 G: 10 INJECTION, SOLUTION INTRAVENOUS at 08:08

## 2020-06-24 RX ADMIN — INSULIN GLARGINE 35 UNITS: 100 INJECTION, SOLUTION SUBCUTANEOUS at 22:09

## 2020-06-24 RX ADMIN — SODIUM CHLORIDE, PRESERVATIVE FREE 10 ML: 5 INJECTION INTRAVENOUS at 22:11

## 2020-06-24 RX ADMIN — INSULIN LISPRO 4 UNITS: 100 INJECTION, SOLUTION INTRAVENOUS; SUBCUTANEOUS at 13:48

## 2020-06-24 RX ADMIN — ASPIRIN 325 MG ORAL TABLET 325 MG: 325 PILL ORAL at 08:07

## 2020-06-24 RX ADMIN — PANTOPRAZOLE SODIUM 40 MG: 40 INJECTION, POWDER, FOR SOLUTION INTRAVENOUS at 08:07

## 2020-06-24 RX ADMIN — INSULIN LISPRO 2 UNITS: 100 INJECTION, SOLUTION INTRAVENOUS; SUBCUTANEOUS at 09:25

## 2020-06-24 RX ADMIN — INSULIN LISPRO 4 UNITS: 100 INJECTION, SOLUTION INTRAVENOUS; SUBCUTANEOUS at 18:43

## 2020-06-24 RX ADMIN — LORAZEPAM 0.5 MG: 0.5 TABLET ORAL at 22:10

## 2020-06-24 ASSESSMENT — PAIN SCALES - GENERAL
PAINLEVEL_OUTOF10: 0
PAINLEVEL_OUTOF10: 0

## 2020-06-24 NOTE — PROGRESS NOTES
Hospitalist Progress Note      Name:  Stevan Lucas /Age/Sex: 1995  (30 y.o. female)   MRN & CSN:  [id number] & [id number] Admission Date/Time: 2020  7:55 AM   Location:  -A PCP: Chelsea Castro MD         Hospital Day: 2    History of Present Illness:     Chief Complaint: DKA, type 1, not at goal [facility])  Stevan Lucas is a 30 y.o.  female  who presents with Tachy apnea and DKA     The patient seen and examined AM at bed side. Says feeling better today, still has some nausea. Ten point ROS reviewed negative, unless as noted above    Objective:        Intake/Output Summary (Last 24 hours) at 2020 0908  Last data filed at 2020 3881  Gross per 24 hour   Intake 3475.6 ml   Output 2200 ml   Net 1275.6 ml      Vitals:   Vitals:    20 0600   BP: (!) 109/51   Pulse: 103   Resp: 21   Temp:    SpO2: 100%     Physical Exam:   General Appearance: alert and oriented to person, place and time, in no acute distress  Cardiovascular: normal rate, regular rhythm, normal S1 and S2, no murmurs, rubs, clicks, or gallops, distal pulses intact, no carotid bruits, no JVD  Pulmonary/Chest: clear to auscultation bilaterally- no wheezes, rales or rhonchi, normal air movement, no respiratory distress  Abdomen: soft, non-tender, non-distended, normal bowel sounds, no masses   Extremities: no cyanosis, clubbing or edema, pulse   Skin: warm and dry, no rash or erythema  Head: normocephalic and atraumatic  Eyes: pupils equal, round, and reactive to light  Neck: supple and non-tender without mass, no thyromegaly   Musculoskeletal: normal range of motion, no joint swelling, deformity or tenderness  Neurological: alert, oriented, normal speech, no focal findings or movement disorder noted    Medications:   Medications:    insulin lispro  15 Units Subcutaneous TID     insulin glargine  35 Units Subcutaneous Nightly    insulin lispro  0-12 Units Subcutaneous TID WC    insulin lispro  0-6 CALCIUM 9.0 06/24/2020    BILITOT 0.4 06/23/2020    ALKPHOS 86 06/23/2020    AST 10 06/23/2020    ALT 6 06/23/2020     Xr Chest Portable    Result Date: 6/23/2020  EXAMINATION: ONE XRAY VIEW OF THE CHEST 6/23/2020 8:11 am COMPARISON: None. HISTORY: Reason for Exam: sob Acuity: Acute Type of Exam: Initial Additional signs and symptoms: female that presents with shortness of breath and concern for elevated blood sugar. Patient reports that she was doing well up until yesterday when the above started. Patient noticed her blood sugar was running a little high last night at \"230\". This morning she reports that her breathing rate had increased and she felt some shortness of breath which she has had in the past when her blood sugar is markedly elevated FINDINGS: The lungs are without acute focal process. No effusion or pneumothorax. The cardiomediastinal silhouette is normal.  The osseous structures are intact without acute process.      Unremarkable chest.       Assessment and Plan:   Estela Islas is a 30 y.o.  female  who presents with DKA, type 1, not at goal [facility])    Diabetic ketoacidosis, severe hyperglycemia and elevated anion gap metabolic acidosis in the setting of insulin-dependent type 1 diabetes mellitus  Blood glucose 464 on presentation  Likely secondary to non compliance  HbA1c 13.3  Anion gap 27 on presentation  On Insulin drip, plan to bridge and change to sub q insulin per endocrine  Continue to Monitor BMP  Diabetic diet   Dr Greta De recommendations appreciated  Blood culture ordered  Had left heart cath for abnormal EKG but with no CAD, STEMI ruled out    Metabolic acidosis with elevated anion gap  pH 6.9 with CO2 3  on presentation  Likely from DKA  Lactic acid slightly elevated but improved with fluids  Given 2 Amp Bicarb initially  HCO3 is still 11, low  Will monitor BMP closely to make sure gap does not open soon    Leukocytosis  Likely reactive  Blood culture ordered  Chest X ray un remarkable  Monitor off antibiotics    Hypophosphatemia and Hypo Magnesemia  Likely with Insulin drip  Replacements ordered     Patient can be transferred out of unit later today if tolerating feeds and improvement on repeat BMP.   Diet DIET CARB CONTROL;   DVT Prophylaxis [x] Lovenox, []  Heparin, [] SCDs, [] Ambulation   GI Prophylaxis [x] PPI,  [] H2 Blocker,  [] Carafate,  [] Diet/Tube Feeds   Code Status Full Code   Disposition Patient requires continued admission due to DKA   MDM [] Low, [x] Moderate,[]  High     Electronically signed by Lupillo Bach MD on 6/24/2020 at 9:08 AM

## 2020-06-24 NOTE — FLOWSHEET NOTE
No answer with second attempt to reach grandmother. Dr Francisco Felix assessed patient at bedside, rapid COVID test ordered & sent, cath lab team now at bedside to transport patient.

## 2020-06-24 NOTE — PROGRESS NOTES
Cardiology Progress Note     Admit Date:  6/23/2020    Consult reason/ Seen today for :   Post CAth fp    Subjective and  Overnight Events : This elevation laterally with tachycardia cardiac cath did not reveal any significant disease, EKG still shows T wave inversions in lateral leads      Chief complain on admission : 30 y. o.year old who is admitted for  Chief Complaint   Patient presents with    Shortness of Breath    Hyperglycemia     bg for ems 527      Assessment / Plan:  Continue supportive care IV hydration  Cause of ST elevation?>  She has no symptoms no pain, I am not sure about etiology could have been spasm? Spontaneous resolution of clot? Or aberrancy? HTN: stable, continue To titrate up medication as needed  DVT Prophylaxis if no contraindication  We will see as needed basis    Past medical history:    has a past medical history of Chronic kidney disease, Chronic kidney disease (CKD) stage G1/A1, glomerular filtration rate (GFR) equal to or greater than 90 mL/min/1.73 square meter and albuminuria creatinine ratio less than 30 mg/g, Diabetes mellitus ([facility] DKA (diabetic ketoacidoses) ([facility] and DKA (diabetic ketoacidoses) ([facility] Past surgical history:   has no past surgical history on file. Social History:   reports that she has never smoked. She has never used smokeless tobacco. She reports that she does not drink alcohol or use drugs. Family history:  family history includes Diabetes in her mother; High Blood Pressure in her mother; Kidney Disease in her mother; Vision Loss in her mother.     No Known Allergies    Review of Systems:    All 14 systems were reviewed and are negative  Except for the positive findings  which as documented     /66   Pulse 102   Temp 98.6 °F (37 °C) (Oral)   Resp 21   Ht 5' (1.524 m)   Wt 97 lb (44 kg)   SpO2 100%   BMI 18.94 kg/m²       Intake/Output Summary (Last 24 hours) ondansetron, sodium chloride flush, acetaminophen, magnesium hydroxide    Lab Data:  CBC:   Recent Labs     06/23/20  0812 06/24/20  0914   WBC 21.4* 22.9*   HGB 18.9* 14.3   HCT 59.2* 42.8   MCV 93.7 88.2    253     BMP:   Recent Labs     06/23/20  1454 06/23/20  1802 06/24/20  0235    139 136   K 4.9 4.3 3.5    108 114*   CO2 3* 6* 11*   PHOS 4.1  --  1.7*   BUN 11 10 11   CREATININE 0.5* 0.5* 0.4*     PT/INR: No results for input(s): PROTIME, INR in the last 72 hours. BNP:    Recent Labs     06/23/20  0812   PROBNP 21.61     TROPONIN:   Recent Labs     06/23/20  0812 06/23/20  1802   TROPONINT <0.010 <0.010        ECHO :   echocardiogram    Assessment:  30 y. o.year old who is admitted for  Chief Complaint   Patient presents with    Shortness of Breath    Hyperglycemia     bg for ems 527    , active issues as noted below:  Impression:  Principal Problem:    DKA, type 1, not at goal [facility])  Active Problems:    Severe malnutrition ([facility]  Resolved Problems:    * No resolved hospital problems. *            All labs, medications and tests reviewed by myself , continue all other medications of all above medical condition listed as is except for changes mentioned above. Thank you very much for consult , please call with questions.     Caitlin Leiva MD 6/24/2020 4:17 PM

## 2020-06-24 NOTE — PROGRESS NOTES
Nutrition Assessment    Type and Reason for Visit: Initial, Positive Nutrition Screen(Weight loss)    Nutrition Recommendations:   · Continue current diet  · Provide Diabetic oral nutrition supplement TID, encourage between meals to improve intake  · Monitor for changes in weight and PO intake    Nutrition Assessment: Pt admitted for DKA and BG of 500+; 2 previous admissions for DKA earlier this year. DM1 non-compliant, states she takes insulin at home, but cautiously to prevent quick drops in BG. Pt has lost a significant amount of weight, -25% x 3 months (3/13/20). Will order Diabetic oral nutrition supplement TID and monitor for PO intake and changes in weight. Malnutrition Assessment:  · Malnutrition Status: Meets the criteria for severe malnutrition  · Context: Chronic illness  · Findings of the 6 clinical characteristics of malnutrition (Minimum of 2 out of 6 clinical characteristics is required to make the diagnosis of moderate or severe Protein Calorie Malnutrition based on AND/ASPEN Guidelines):  1. Energy Intake-Less than or equal to 50% of estimated energy requirement, Greater than or equal to 3 months    2. Weight Loss-20% loss or greater, in 3 months  3. Fat Loss-Unable to assess,    4. Muscle Loss-Unable to assess,    5. Fluid Accumulation-No significant fluid accumulation,    6.   Strength-Not measured    Nutrition Risk Level: High    Nutrient Needs:  · Estimated Daily Total Kcal: 5411-2229 ([place] [facility] Jeor)  · Estimated Daily Protein (g): 45-55 (1-1.2 gm/kg IBW)  · Estimated Daily Total Fluid (ml/day): 9477-1896 (1 mL/kcal)    Nutrition Diagnosis:   · Problem: Severe malnutrition, In context of chronic illness  · Etiology: related to Endocrine dysfunction, Insufficient energy/nutrient consumption     Signs and symptoms:  as evidenced by Weight loss greater than or equal to 7.5% in 3 months, Nausea, Lab values    Objective Information:  · Nutrition-Focused Physical Findings: Pt was curled up sleeping quietly in bed  · Wound Type: None  · Current Nutrition Therapies:  · Oral Diet Orders: Carb Control 4 Carbs/Meal   · Oral Diet intake: Unable to assess  · Oral Nutrition Supplement (ONS) Orders: None  · Anthropometric Measures:  · Ht: 5' (152.4 cm)   · Current Body Wt: 97 lb (44 kg)  · Admission Body Wt: 97 lb (44 kg)  · Usual Body Wt: (CLARISSA)  · % Weight Change:   -25% x 3 months(3/13/20)  · Ideal Body Wt: 100 lb (45.4 kg), % Ideal Body 97%  · BMI Classification: BMI 18.5 - 24.9 Normal Weight    Nutrition Interventions:   Continue current diet, Start ONS  Continued Inpatient Monitoring, Education not appropriate at this time, Coordination of Care    Nutrition Evaluation:   · Evaluation: Goals set   · Goals: Pt will consume >75% of all meals and supplements provided    · Monitoring: Nutrition Progression, Meal Intake, Supplement Intake, Weight, Pertinent Labs, Nausea or Vomiting      Electronically signed by Porter Mcelroy on 6/24/20 at 12:24 PM EDT    Contact Number: [id number]

## 2020-06-24 NOTE — CONSULTS
Endocrinology   Consult Note  Dear Doctor Saniya Monroy for the Consult     Pt. Was Admitted for : Nausea vomiting, hyperglycemia and DKA    Reason for Consult: Better control of blood glucose      History Obtained From:  Patient/ EMR       HISTORY OF PRESENT ILLNESS:                The patient is a 30 y.o. female with significant past medical history of type 1 diabetes mellitus, mild chronic kidney disease, peripheral neuropathy, less than desirable compliance comes in complaining of nausea vomiting dehydration and high blood glucose. I was  consulted for better control of blood glucose. ROS:   Pt's ROS done in detail. Abnormal ROS are noted in Medical and Surgical History Section below: Other Medical History:        Diagnosis Date    Chronic kidney disease     Chronic kidney disease (CKD) stage G1/A1, glomerular filtration rate (GFR) equal to or greater than 90 mL/min/1.73 square meter and albuminuria creatinine ratio less than 30 mg/g 1/22/2018    Diabetes mellitus ([address]     DKA (diabetic ketoacidoses) ([address]     [facility]acidoses) [facility]      Surgical History:    History reviewed. No pertinent surgical history. Allergies:  Patient has no known allergies. Family History:       Problem Relation Age of Onset    Diabetes Mother     High Blood Pressure Mother     Kidney Disease Mother     Vision Loss Mother      REVIEW OF SYSTEMS:  Review of System Done as noted above     PHYSICAL EXAM:      Vitals:    /75   Pulse 103   Temp 98.6 °F (37 °C) (Rectal)   Resp 28   Ht 5' (1.524 m)   Wt 97 lb 14.2 oz (44.4 kg)   SpO2 100%   BMI 19.12 kg/m²     CONSTITUTIONAL:  awake, alert, cooperative, appears stated age   EYES:  vision intact Fundoscopic Exam not performed   ENT:Normal  NECK:  Supple, No JVD.    Thyroid Exam:Normal   LUNGS:  Has Vesicular Breath Sounds,   CARDIOVASCULAR:  Normal apical impulse, regular rate and rhythm, normal S1 and S2, no S3 or S4, and has no murmur   ABDOMEN:  No scars, normal bowel sounds, soft, non-distended, non-tender, no masses palpated, no hepatolienomegaly  Musculoskeletal: Normal  Extremities: Normal, peripheral pulses normal, , has no edema   NEUROLOGIC:  Awake, alert, oriented to name, place and time. Cranial nerves II-XII are grossly intact. Motor is  intact. Sensory neuropathy,  and gait is normal.    DATA:    CBC:   Recent Labs     06/23/20  0812   WBC 21.4*   HGB 18.9*       CMP:  Recent Labs     06/23/20  0812 06/23/20  1454 06/23/20  1802   * 136 139   K 5.4* 4.9 4.3   CL 93* 106 108   CO2 2* 3* 6*   BUN 9 11 10   CREATININE 0.7 0.5* 0.5*   CALCIUM 10.0 9.5 9.6   PROT 9.3*  --   --    LABALBU 4.9  --   --    BILITOT 0.4  --   --    ALKPHOS 86  --   --    AST 10*  --   --    ALT 6*  --   --      Lipids:   Lab Results   Component Value Date    CHOL 205 12/11/2017    HDL 40 09/04/2019    TRIG 164 12/11/2017     Glucose:   Recent Labs     06/23/20  1511 06/23/20  1702 06/23/20  1902   POCGLU 381* 307* 232*     Hemoglobin A1C:   Lab Results   Component Value Date    LABA1C 13.3 04/20/2020     Free T4: No results found for: T4FREE  Free T3: No results found for: FT3  TSH High Sensitivity: No results found for: [facility]    Xr Chest Portable    Result Date: 6/23/2020  EXAMINATION: ONE XRAY VIEW OF THE CHEST 6/23/2020 8:11 am COMPARISON: None. HISTORY: Reason for Exam: sob Acuity: Acute Type of Exam: Initial Additional signs and symptoms: female that presents with shortness of breath and concern for elevated blood sugar. Patient reports that she was doing well up until yesterday when the above started. Patient noticed her blood sugar was running a little high last night at \"230\". This morning she reports that her breathing rate had increased and she felt some shortness of breath which she has had in the past when her blood sugar is markedly elevated FINDINGS: The lungs are without acute focal process. No effusion or pneumothorax. I will SWITCH the dose or number of times a day I take the medications listed below when I get home from the hospital:  None

## 2020-06-24 NOTE — PROGRESS NOTES
Progress Note( Dr. Satnam Barrientos)  6/24/2020  Subjective:   Admit Date: 6/23/2020  PCP: Jerome Wagner MD    Admitted For : Nausea vomiting abdominal pain and DKA    Consulted For: Better control of blood glucose    Interval History: Feels somewhat better not able to eat  Patient had EKG changes suggestive of ST MI by cardiology patient was taken to cardiac Cath Lab last night and cardiac cath was done and it was negative there is no significant coronary artery disease noted    Denies any chest pains,   Denies SOB . Denies nausea or vomiting. Likes to eat  No new bowel or bladder symptoms. Intake/Output Summary (Last 24 hours) at 6/24/2020 0627  Last data filed at 6/24/2020 0300  Gross per 24 hour   Intake 1151.6 ml   Output 2200 ml   Net -1048.4 ml       DATA    CBC:   Recent Labs     06/23/20  0812   WBC 21.4*   HGB 18.9*       CMP:  Recent Labs     06/23/20  0812 06/23/20  1454 06/23/20  1802 06/24/20  0235   * 136 139 136   K 5.4* 4.9 4.3 3.5   CL 93* 106 108 114*   CO2 2* 3* 6* 11*   BUN 9 11 10 11   CREATININE 0.7 0.5* 0.5* 0.4*   CALCIUM 10.0 9.5 9.6 9.0   PROT 9.3*  --   --   --    LABALBU 4.9  --   --   --    BILITOT 0.4  --   --   --    ALKPHOS 86  --   --   --    AST 10*  --   --   --    ALT 6*  --   --   --      Lipids:   Lab Results   Component Value Date    CHOL 205 12/11/2017    HDL 40 09/04/2019    TRIG 164 12/11/2017     Glucose:  Recent Labs     06/24/20  0132 06/24/20  0300 06/24/20  0603   POCGLU 252* 231* 231*     [id number]:  Lab Results   Component Value Date    LABA1C 13.3 04/20/2020     High Sensitivity TSH: No results found for: TSHHS  Free T3: No results found for: FT3  Free T4:No results found for: T4FREE    Xr Chest Portable    Result Date: 6/23/2020  EXAMINATION: ONE XRAY VIEW OF THE CHEST 6/23/2020 8:11 am COMPARISON: None.  HISTORY: Reason for Exam: sob Acuity: Acute Type of Exam: Initial Additional signs and symptoms: female that presents with shortness of breath and concern for elevated blood sugar. Patient reports that she was doing well up until yesterday when the above started. Patient noticed her blood sugar was running a little high last night at \"230\". This morning she reports that her breathing rate had increased and she felt some shortness of breath which she has had in the past when her blood sugar is markedly elevated FINDINGS: The lungs are without acute focal process. No effusion or pneumothorax. The cardiomediastinal silhouette is normal.  The osseous structures are intact without acute process. Unremarkable chest.       Scheduled Medicines   Medications:    insulin lispro  15 Units Subcutaneous TID WC    insulin glargine  35 Units Subcutaneous Nightly    insulin lispro  0-12 Units Subcutaneous TID WC    insulin lispro  0-6 Units Subcutaneous 2 times per day    pantoprazole  40 mg Intravenous Daily    enoxaparin  1 mg/kg Subcutaneous BID    aspirin  325 mg Oral Daily    sodium chloride flush  10 mL Intravenous 2 times per day      Infusions:    dextrose      dextrose 5 % and 0.45 % NaCl 150 mL/hr at 06/24/20 0304    insulin 4 Units/hr (06/24/20 0604)         Objective:   Vitals: BP (!) 109/51   Pulse 103   Temp 98.6 °F (37 °C) (Oral)   Resp 21   Ht 5' (1.524 m)   Wt 97 lb 14.2 oz (44.4 kg)   SpO2 100%   BMI 19.12 kg/m²   General appearance: alert and cooperative with exam  Neck: no JVD or bruit  Thyroid : Normal lobes   Lungs: Has Vesicular Breath sounds   Heart:  regular rate and rhythm  Abdomen: soft, non-tender; bowel sounds normal; no masses,  no organomegaly  Musculoskeletal: Normal  Extremities: extremities normal, , no edema  Neurologic:  Awake, alert, oriented to name, place and time. Cranial nerves II-XII are grossly intact. Motor is  intact. Sensory neuropathy. ,  and gait is normal.    Assessment:     Patient Active Problem List:     DKA (diabetic ketoacidoses) ([address]     Diabetic ketoacidosis ([facility]     DKA, type 1, not at goal ([facility]     DM (diabetes mellitus) ([facility]     Proteinuria     Chronic kidney disease (CKD) stage G1/A1, glomerular filtration rate (GFR) equal to or greater than 90 mL/min/1.73 square meter and albuminuria creatinine ratio less than 30 mg/g      Plan:     1. Reviewed POC blood glucose . Labs and X ray results   2. Reviewed Current Medicines   3. Will discontinue IV insulin infusion drip protocol  4. Will start on meal/ Correction bolus Humalog/ Basal Lantus Insulin regime    5. Monitor Blood glucose frequently   6. Modified  the dose of Insulin/ other medicines as needed   7. Will follow     .      Maddie Obando MD

## 2020-06-24 NOTE — CARE COORDINATION
Met with patient for discharge planning. Patient arouses to voice but remains drowsy during conversation. Patient stated that she lives at home with grandparents (since her mother passed away 8 years ago). Patient stated that she is independent except for transportation- gets ride from grandparents or [facility]. 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 She has insurance that assists with Rx and supplies. She has a working meter and all supplies. Questioned why A1C is 12 - patient stated that it's hard to manage. Discussed need for education (DM classes, etc) Patient stated  \"I've been thru those- it don't deal with real life\". Patient did express that she would like her Lyrica restarted as she is having pain in feet/legs. Discussed need for PCP- she is open to [address] for primary care. Discussed need for grief counseling- patient remains in mourning over her mother's death . She is open to counseling to help with sadness, loss and need for closure. CM to remain available.  Jewels Ruiz RN

## 2020-06-24 NOTE — PROGRESS NOTES
2115- Patient returned to 2117 from Cath lab s/p diagnostic [facility]. Tegaderm and gauze to right groin s/p removal of 6 fr arterial sheath. No bleeding nor hematoma noted. Bedside report and site check from Marisol Trevino RN to PETRONA Farris. Vitals stable. Call light within reach. Patient received 50 mcg kady and 250 NS in Cath Lab.

## 2020-06-25 LAB
ANION GAP SERPL CALCULATED.3IONS-SCNC: 9 MMOL/L (ref 4–16)
BASE EXCESS: 3 (ref 0–2.4)
BUN BLDV-MCNC: 6 MG/DL (ref 6–23)
CALCIUM SERPL-MCNC: 9 MG/DL (ref 8.3–10.6)
CHLORIDE BLD-SCNC: 111 MMOL/L (ref 99–110)
CO2: 19 MMOL/L (ref 21–32)
COMMENT: ABNORMAL
CREAT SERPL-MCNC: 0.4 MG/DL (ref 0.6–1.1)
EKG ATRIAL RATE: 99 BPM
EKG DIAGNOSIS: NORMAL
EKG P AXIS: 63 DEGREES
EKG P-R INTERVAL: 104 MS
EKG Q-T INTERVAL: 388 MS
EKG QRS DURATION: 94 MS
EKG QTC CALCULATION (BAZETT): 497 MS
EKG R AXIS: 68 DEGREES
EKG T AXIS: -79 DEGREES
EKG VENTRICULAR RATE: 99 BPM
GFR AFRICAN AMERICAN: >60 ML/MIN/1.73M2
GFR NON-AFRICAN AMERICAN: >60 ML/MIN/1.73M2
GLUCOSE BLD-MCNC: 114 MG/DL (ref 70–99)
GLUCOSE BLD-MCNC: 116 MG/DL (ref 70–99)
GLUCOSE BLD-MCNC: 139 MG/DL (ref 70–99)
GLUCOSE BLD-MCNC: 178 MG/DL (ref 70–99)
GLUCOSE BLD-MCNC: 207 MG/DL (ref 70–99)
GLUCOSE BLD-MCNC: 298 MG/DL (ref 70–99)
HCO3 VENOUS: 20.6 MMOL/L (ref 19–25)
HCT VFR BLD CALC: 37.6 % (ref 37–47)
HEMOGLOBIN: 13.3 GM/DL (ref 12.5–16)
MCH RBC QN AUTO: 29.4 PG (ref 27–31)
MCHC RBC AUTO-ENTMCNC: 35.4 % (ref 32–36)
MCV RBC AUTO: 83 FL (ref 78–100)
O2 SAT, VEN: 95.9 % (ref 50–70)
PCO2, VEN: 31 MMHG (ref 38–52)
PDW BLD-RTO: 11.4 % (ref 11.7–14.9)
PH VENOUS: 7.43 (ref 7.32–7.42)
PLATELET # BLD: 225 K/CU MM (ref 140–440)
PMV BLD AUTO: 10.2 FL (ref 7.5–11.1)
PO2, VEN: 132 MMHG (ref 28–48)
POTASSIUM SERPL-SCNC: 3.2 MMOL/L (ref 3.5–5.1)
RBC # BLD: 4.53 M/CU MM (ref 4.2–5.4)
SODIUM BLD-SCNC: 139 MMOL/L (ref 135–145)
WBC # BLD: 13.2 K/CU MM (ref 4–10.5)

## 2020-06-25 PROCEDURE — 6360000002 HC RX W HCPCS: Performed by: HOSPITALIST

## 2020-06-25 PROCEDURE — 82962 GLUCOSE BLOOD TEST: CPT

## 2020-06-25 PROCEDURE — 6360000002 HC RX W HCPCS: Performed by: INTERNAL MEDICINE

## 2020-06-25 PROCEDURE — 94761 N-INVAS EAR/PLS OXIMETRY MLT: CPT

## 2020-06-25 PROCEDURE — C9113 INJ PANTOPRAZOLE SODIUM, VIA: HCPCS | Performed by: INTERNAL MEDICINE

## 2020-06-25 PROCEDURE — 2580000003 HC RX 258: Performed by: INTERNAL MEDICINE

## 2020-06-25 PROCEDURE — 36415 COLL VENOUS BLD VENIPUNCTURE: CPT

## 2020-06-25 PROCEDURE — 6370000000 HC RX 637 (ALT 250 FOR IP): Performed by: PHYSICIAN ASSISTANT

## 2020-06-25 PROCEDURE — 6370000000 HC RX 637 (ALT 250 FOR IP): Performed by: INTERNAL MEDICINE

## 2020-06-25 PROCEDURE — 82805 BLOOD GASES W/O2 SATURATION: CPT

## 2020-06-25 PROCEDURE — 93010 ELECTROCARDIOGRAM REPORT: CPT | Performed by: INTERNAL MEDICINE

## 2020-06-25 PROCEDURE — 1200000000 HC SEMI PRIVATE

## 2020-06-25 PROCEDURE — 80048 BASIC METABOLIC PNL TOTAL CA: CPT

## 2020-06-25 PROCEDURE — 93005 ELECTROCARDIOGRAM TRACING: CPT | Performed by: INTERNAL MEDICINE

## 2020-06-25 PROCEDURE — 85027 COMPLETE CBC AUTOMATED: CPT

## 2020-06-25 PROCEDURE — 99231 SBSQ HOSP IP/OBS SF/LOW 25: CPT | Performed by: INTERNAL MEDICINE

## 2020-06-25 RX ORDER — POTASSIUM CHLORIDE 20 MEQ/1
40 TABLET, EXTENDED RELEASE ORAL PRN
Status: DISCONTINUED | OUTPATIENT
Start: 2020-06-25 | End: 2020-06-26 | Stop reason: HOSPADM

## 2020-06-25 RX ORDER — PANTOPRAZOLE SODIUM 40 MG/10ML
40 INJECTION, POWDER, LYOPHILIZED, FOR SOLUTION INTRAVENOUS 2 TIMES DAILY
Status: DISCONTINUED | OUTPATIENT
Start: 2020-06-25 | End: 2020-06-26 | Stop reason: HOSPADM

## 2020-06-25 RX ORDER — POTASSIUM CHLORIDE 7.45 MG/ML
10 INJECTION INTRAVENOUS PRN
Status: DISCONTINUED | OUTPATIENT
Start: 2020-06-25 | End: 2020-06-26 | Stop reason: HOSPADM

## 2020-06-25 RX ADMIN — ENOXAPARIN SODIUM 40 MG: 40 INJECTION SUBCUTANEOUS at 09:35

## 2020-06-25 RX ADMIN — SODIUM CHLORIDE, PRESERVATIVE FREE 10 ML: 5 INJECTION INTRAVENOUS at 09:36

## 2020-06-25 RX ADMIN — SODIUM CHLORIDE: 9 INJECTION, SOLUTION INTRAVENOUS at 03:46

## 2020-06-25 RX ADMIN — INSULIN GLARGINE 35 UNITS: 100 INJECTION, SOLUTION SUBCUTANEOUS at 20:34

## 2020-06-25 RX ADMIN — SODIUM CHLORIDE, PRESERVATIVE FREE 10 ML: 5 INJECTION INTRAVENOUS at 20:35

## 2020-06-25 RX ADMIN — SODIUM CHLORIDE: 9 INJECTION, SOLUTION INTRAVENOUS at 20:36

## 2020-06-25 RX ADMIN — ASPIRIN 325 MG ORAL TABLET 325 MG: 325 PILL ORAL at 09:35

## 2020-06-25 RX ADMIN — ACETAMINOPHEN 650 MG: 325 TABLET ORAL at 23:25

## 2020-06-25 RX ADMIN — PANTOPRAZOLE SODIUM 40 MG: 40 INJECTION, POWDER, FOR SOLUTION INTRAVENOUS at 20:36

## 2020-06-25 RX ADMIN — POTASSIUM CHLORIDE 40 MEQ: 1500 TABLET, EXTENDED RELEASE ORAL at 03:46

## 2020-06-25 RX ADMIN — ENOXAPARIN SODIUM 40 MG: 40 INJECTION SUBCUTANEOUS at 20:35

## 2020-06-25 RX ADMIN — PANTOPRAZOLE SODIUM 40 MG: 40 INJECTION, POWDER, FOR SOLUTION INTRAVENOUS at 09:35

## 2020-06-25 ASSESSMENT — PAIN SCALES - GENERAL
PAINLEVEL_OUTOF10: 0
PAINLEVEL_OUTOF10: 0
PAINLEVEL_OUTOF10: 2
PAINLEVEL_OUTOF10: 0
PAINLEVEL_OUTOF10: 5

## 2020-06-25 NOTE — PROGRESS NOTES
Hospitalist Progress Note      Name:  Elayne Torres /Age/Sex: 1995  (30 y.o. female)   MRN & CSN:  [id number] & [id number] Admission Date/Time: 2020  7:55 AM   Location:   PCP: Cliff Hutchins MD         Hospital Day: 3    History of Present Illness:     Chief Complaint: DKA, type 1, not at goal [facility])  Elayne Torres is a 30 y.o.  female  who presents with Tachy apnea and DKA     The patient seen and examined AM at bed side. Says feeling better today, still has some nausea, however has increased p.o. intake, states that her mouth is slightly dry, denies any fevers, states that she thinks she went to DKA because she was throwing up however she is not tender anywhere. Otherwise patient has no other complaints agrees with plan of IV hydration, does not feel any palpitations chest pain, shortness of breath. Understands her heart rate is Lovenox, given that she is slightly dehydrated. Ten point ROS reviewed negative, unless as noted above    Objective:        Intake/Output Summary (Last 24 hours) at 2020 0749  Last data filed at 2020 0700  Gross per 24 hour   Intake 4243.37 ml   Output 1850 ml   Net 2393.37 ml      Vitals:   Vitals:    20 0700   BP: (!) 132/50   Pulse: 99   Resp: 17   Temp:    SpO2: 99%     Physical Exam:   General Appearance: alert and oriented to person, place and time, in no acute distress  Cardiovascular: Tachycardia mild, regular rhythm, normal S1 and S2, no murmurs, rubs, clicks, or gallops, distal pulses intact, no carotid bruits, no JVD  Pulmonary/Chest: clear to auscultation bilaterally- no wheezes, rales or rhonchi, normal air movement, no respiratory distress  Abdomen: soft, non-tender, non-distended, normal bowel sounds, no masses   Extremities: no cyanosis, clubbing or edema, pulse   Skin: warm and dry, no rash or erythema; dry skin  Head: normocephalic and atraumatic; dry oral mucosa  Eyes: pupils equal, round, and reactive to light  Neck: supple and non-tender without mass, no thyromegaly   Musculoskeletal: normal range of motion, no joint swelling, deformity or tenderness  Neurological: alert, oriented, normal speech, no focal findings or movement disorder noted    Medications:   Medications:    insulin lispro  20 Units Subcutaneous TID     insulin glargine  35 Units Subcutaneous Nightly    insulin lispro  0-12 Units Subcutaneous TID     insulin lispro  0-6 Units Subcutaneous 2 times per day    pantoprazole  40 mg Intravenous Daily    enoxaparin  1 mg/kg Subcutaneous BID    aspirin  325 mg Oral Daily    sodium chloride flush  10 mL Intravenous 2 times per day      Infusions:    dextrose      sodium chloride 100 mL/hr at 06/25/20 0346    dextrose 5 % and 0.45 % NaCl 150 mL/hr at 06/24/20 0304     PRN Meds: potassium chloride, 40 mEq, PRN    Or  potassium alternative oral replacement, 40 mEq, PRN    Or  potassium chloride, 10 mEq, PRN  glucose, 15 g, PRN  dextrose, 12.5 g, PRN  glucagon (rDNA), 1 mg, PRN  dextrose, 100 mL/hr, PRN  magnesium sulfate, 1 g, PRN  sodium phosphate IVPB, 10 mmol, PRN    Or  sodium phosphate IVPB, 15 mmol, PRN    Or  sodium phosphate IVPB, 20 mmol, PRN  dextrose 5 % and 0.45 % NaCl, , Continuous PRN  ondansetron, 4 mg, Q6H PRN  sodium chloride flush, 10 mL, PRN  acetaminophen, 650 mg, Q4H PRN  magnesium hydroxide, 30 mL, Daily PRN            Pertinent New Labs & Imaging Studies     CBC with Differential:    Lab Results   Component Value Date    WBC 13.2 06/25/2020    RBC 4.53 06/25/2020    HGB 13.3 06/25/2020    HCT 37.6 06/25/2020     06/25/2020    MCV 83.0 06/25/2020    MCH 29.4 06/25/2020    MCHC 35.4 06/25/2020    RDW 11.4 06/25/2020    SEGSPCT 86.1 06/23/2020    BANDSPCT 2 12/28/2017    LYMPHOPCT 10.6 06/23/2020    MONOPCT 2.2 06/23/2020    EOSPCT 0.0 07/10/2011    BASOPCT 0.4 06/23/2020    MONOSABS 0.5 06/23/2020    LYMPHSABS 2.3 06/23/2020    EOSABS 0.0 06/23/2020    BASOSABS 0.1 06/23/2020    DIFFTYPE AUTOMATED DIFFERENTIAL 06/23/2020     CMP:    Lab Results   Component Value Date     06/25/2020    K 3.2 06/25/2020     06/25/2020    CO2 19 06/25/2020    BUN 6 06/25/2020    CREATININE 0.4 06/25/2020    GFRAA >60 06/25/2020    AGRATIO 1.6 09/04/2019    LABGLOM >60 06/25/2020    GLUCOSE 207 06/25/2020    PROT 9.3 06/23/2020    PROT 9.4 09/03/2012    LABALBU 4.9 06/23/2020    CALCIUM 9.0 06/25/2020    BILITOT 0.4 06/23/2020    ALKPHOS 86 06/23/2020    AST 10 06/23/2020    ALT 6 06/23/2020     Xr Chest Portable    Result Date: 6/23/2020  EXAMINATION: ONE XRAY VIEW OF THE CHEST 6/23/2020 8:11 am COMPARISON: None. HISTORY: Reason for Exam: sob Acuity: Acute Type of Exam: Initial Additional signs and symptoms: female that presents with shortness of breath and concern for elevated blood sugar. Patient reports that she was doing well up until yesterday when the above started. Patient noticed her blood sugar was running a little high last night at \"230\". This morning she reports that her breathing rate had increased and she felt some shortness of breath which she has had in the past when her blood sugar is markedly elevated FINDINGS: The lungs are without acute focal process. No effusion or pneumothorax. The cardiomediastinal silhouette is normal.  The osseous structures are intact without acute process.      Unremarkable chest.       Assessment and Plan:   Estela Mcdermott is a 30 y.o.  female  who presents with DKA, type 1, not at goal [facility])    Diabetic ketoacidosis, severe hyperglycemia and elevated anion gap metabolic acidosis in the setting of insulin-dependent type 1 diabetes mellitus  Blood glucose 464 on presentation  Likely secondary to non compliance  HbA1c 13.3  Anion gap 27 on presentation  On Insulin drip, plan to bridge and change to sub q insulin per endocrine  Continue to Monitor BMP  Diabetic diet   Dr Coleen Abreu recommendations appreciated  Blood culture ordered  Had left heart cath for abnormal EKG but with no CAD, STEMI ruled out    Metabolic acidosis with elevated anion gap  pH 6.9 with CO2 3  on presentation   Likely from DKA, will repeat VBG, much improved  Lactic acid slightly elevated but improved with fluids  Given 2 Amp Bicarb initially  HCO3 improved  Will monitor BMP closely to make sure gap does not open soon    Leukocytosis  Likely reactive  Blood culture ordered  Chest X ray un remarkable  Monitor off antibiotics    Hypophosphatemia and Hypo Magnesemia  Likely with Insulin drip  Replacements ordered     Patient okay to be downgraded, will continue admission for IV hydration    Patient can be transferred out of unit later today if tolerating feeds and improvement on repeat BMP. Diet DIET CARB CONTROL;   Dietary Nutrition Supplements: Diabetic Oral Supplement   DVT Prophylaxis [x] Lovenox, []  Heparin, [] SCDs, [] Ambulation   GI Prophylaxis [x] PPI,  [] H2 Blocker,  [] Carafate,  [] Diet/Tube Feeds   Code Status Full Code   Disposition Patient requires continued admission due to DKA   MDM [] Low, [x] Moderate,[]  High     Electronically signed by Rosy Donahue MD on 6/25/2020 at 7:49 AM

## 2020-06-25 NOTE — PROGRESS NOTES
Progress Note( Dr. Yael Chang)  6/25/2020  Subjective:   Admit Date: 6/23/2020  PCP: Nixon Keith MD    Admitted For : Nausea vomiting abdominal pain and DKA    Consulted For: Better control of blood glucose    Interval History: Feels somewhat better not able to eat  Patient had EKG changes suggestive of ST MI by cardiology patient was taken to cardiac Cath Lab last night and cardiac cath was done and it was negative there is no significant coronary artery disease noted    Denies any chest pains,   Denies SOB . Denies nausea or vomiting. Likes to eat  No new bowel or bladder symptoms. Intake/Output Summary (Last 24 hours) at 6/25/2020 0607  Last data filed at 6/24/2020 2100  Gross per 24 hour   Intake 3645.67 ml   Output 1850 ml   Net 1795.67 ml       DATA    CBC:   Recent Labs     06/23/20  0812 06/24/20  0914 06/25/20  0215   WBC 21.4* 22.9* 13.2*   HGB 18.9* 14.3 13.3    253 225    CMP:  Recent Labs     06/23/20  0812  06/23/20  1802 06/24/20  0235 06/25/20  0215   *   < > 139 136 139   K 5.4*   < > 4.3 3.5 3.2*   CL 93*   < > 108 114* 111*   CO2 2*   < > 6* 11* 19*   BUN 9   < > 10 11 6   CREATININE 0.7   < > 0.5* 0.4* 0.4*   CALCIUM 10.0   < > 9.6 9.0 9.0   PROT 9.3*  --   --   --   --    LABALBU 4.9  --   --   --   --    BILITOT 0.4  --   --   --   --    ALKPHOS 86  --   --   --   --    AST 10*  --   --   --   --    ALT 6*  --   --   --   --     < > = values in this interval not displayed.      Lipids:   Lab Results   Component Value Date    CHOL 205 12/11/2017    HDL 40 09/04/2019    TRIG 164 12/11/2017     Glucose:  Recent Labs     06/24/20  1742 06/24/20  2056 06/25/20  0222   POCGLU 247* 330* 178*     [id number]:  Lab Results   Component Value Date    LABA1C 11.9 06/24/2020     High Sensitivity TSH: No results found for: TSHHS  Free T3: No results found for: FT3  Free T4:No results found for: T4FREE    Xr Chest Portable    Result Date: 6/23/2020  EXAMINATION: ONE XRAY VIEW OF THE intact. Sensory neuropathy. ,  and gait is normal.    Assessment:     Patient Active Problem List:     DKA (diabetic ketoacidoses) ([facility]     Diabetic ketoacidosis ([facility]     DKA, type 1, not at goal [facility])     DM (diabetes mellitus) ([facility]     Proteinuria     Chronic kidney disease (CKD) stage G1/A1, glomerular filtration rate (GFR) equal to or greater than 90 mL/min/1.73 square meter and albuminuria creatinine ratio less than 30 mg/g      Plan:     1. Reviewed POC blood glucose . Labs and X ray results   2. Reviewed Current Medicines   3. Will discontinue IV insulin infusion drip protocol  4. Will start on meal/ Correction bolus Humalog/ Basal Lantus Insulin regime    5. Monitor Blood glucose frequently   6. Modified  the dose of Insulin/ other medicines as needed   7. Will follow     .      Bonita Lima MD

## 2020-06-25 NOTE — PLAN OF CARE
Problem: Falls - Risk of:  Goal: Will remain free from falls  Description: Will remain free from falls  Outcome: Ongoing  Goal: Absence of physical injury  Description: Absence of physical injury  Outcome: Ongoing     Problem: Discharge Planning:  Goal: Discharged to appropriate level of care  Description: Discharged to appropriate level of care  Outcome: Ongoing     Problem: Fluid Volume - Imbalance:  Goal: Will remain free of signs and symptoms of dehydration  Description: Will remain free of signs and symptoms of dehydration  Outcome: Ongoing  Goal: Absence of imbalanced fluid volume signs and symptoms  Description: Absence of imbalanced fluid volume signs and symptoms  Outcome: Ongoing     Problem: Serum Glucose Level - Abnormal:  Goal: Ability to maintain appropriate glucose levels will improve  Description: Ability to maintain appropriate glucose levels will improve  Outcome: Ongoing     Problem: Injury - Acid Base Imbalance:  Goal: Acid-base balance  Description: Acid-base balance  Outcome: Ongoing

## 2020-06-26 VITALS
RESPIRATION RATE: 16 BRPM | HEIGHT: 60 IN | BODY MASS INDEX: 24.35 KG/M2 | SYSTOLIC BLOOD PRESSURE: 120 MMHG | DIASTOLIC BLOOD PRESSURE: 75 MMHG | TEMPERATURE: 98.1 F | HEART RATE: 106 BPM | OXYGEN SATURATION: 99 % | WEIGHT: 124 LBS

## 2020-06-26 LAB
ANION GAP SERPL CALCULATED.3IONS-SCNC: 7 MMOL/L (ref 4–16)
ANION GAP SERPL CALCULATED.3IONS-SCNC: 7 MMOL/L (ref 4–16)
BASE EXCESS MIXED: 0.7 (ref 0–2.3)
BUN BLDV-MCNC: 3 MG/DL (ref 6–23)
BUN BLDV-MCNC: 3 MG/DL (ref 6–23)
CALCIUM SERPL-MCNC: 8.6 MG/DL (ref 8.3–10.6)
CALCIUM SERPL-MCNC: 8.7 MG/DL (ref 8.3–10.6)
CHLORIDE BLD-SCNC: 104 MMOL/L (ref 99–110)
CHLORIDE BLD-SCNC: 105 MMOL/L (ref 99–110)
CO2: 23 MMOL/L (ref 21–32)
CO2: 24 MMOL/L (ref 21–32)
COMMENT: ABNORMAL
CREAT SERPL-MCNC: 0.3 MG/DL (ref 0.6–1.1)
CREAT SERPL-MCNC: 0.4 MG/DL (ref 0.6–1.1)
GFR AFRICAN AMERICAN: >60 ML/MIN/1.73M2
GFR AFRICAN AMERICAN: >60 ML/MIN/1.73M2
GFR NON-AFRICAN AMERICAN: >60 ML/MIN/1.73M2
GFR NON-AFRICAN AMERICAN: >60 ML/MIN/1.73M2
GLUCOSE BLD-MCNC: 178 MG/DL (ref 70–99)
GLUCOSE BLD-MCNC: 73 MG/DL (ref 70–99)
GLUCOSE BLD-MCNC: 84 MG/DL (ref 70–99)
GLUCOSE BLD-MCNC: 90 MG/DL (ref 70–99)
GLUCOSE BLD-MCNC: 91 MG/DL (ref 70–99)
GLUCOSE BLD-MCNC: 97 MG/DL (ref 70–99)
HCO3 VENOUS: 24.3 MMOL/L (ref 19–25)
HCT VFR BLD CALC: 37.8 % (ref 37–47)
HEMOGLOBIN: 13.3 GM/DL (ref 12.5–16)
MAGNESIUM: 1.6 MG/DL (ref 1.8–2.4)
MCH RBC QN AUTO: 29.6 PG (ref 27–31)
MCHC RBC AUTO-ENTMCNC: 35.2 % (ref 32–36)
MCV RBC AUTO: 84 FL (ref 78–100)
O2 SAT, VEN: 96.4 % (ref 50–70)
PCO2, VEN: 35 MMHG (ref 38–52)
PDW BLD-RTO: 10.9 % (ref 11.7–14.9)
PH VENOUS: 7.45 (ref 7.32–7.42)
PLATELET # BLD: 209 K/CU MM (ref 140–440)
PMV BLD AUTO: 10.3 FL (ref 7.5–11.1)
PO2, VEN: 166 MMHG (ref 28–48)
POTASSIUM SERPL-SCNC: 2.6 MMOL/L (ref 3.5–5.1)
POTASSIUM SERPL-SCNC: 3.3 MMOL/L (ref 3.5–5.1)
RBC # BLD: 4.5 M/CU MM (ref 4.2–5.4)
SODIUM BLD-SCNC: 135 MMOL/L (ref 135–145)
SODIUM BLD-SCNC: 135 MMOL/L (ref 135–145)
WBC # BLD: 9.2 K/CU MM (ref 4–10.5)

## 2020-06-26 PROCEDURE — C9113 INJ PANTOPRAZOLE SODIUM, VIA: HCPCS | Performed by: INTERNAL MEDICINE

## 2020-06-26 PROCEDURE — 6360000002 HC RX W HCPCS: Performed by: HOSPITALIST

## 2020-06-26 PROCEDURE — 83735 ASSAY OF MAGNESIUM: CPT

## 2020-06-26 PROCEDURE — 6370000000 HC RX 637 (ALT 250 FOR IP): Performed by: INTERNAL MEDICINE

## 2020-06-26 PROCEDURE — 94761 N-INVAS EAR/PLS OXIMETRY MLT: CPT

## 2020-06-26 PROCEDURE — 6360000002 HC RX W HCPCS: Performed by: INTERNAL MEDICINE

## 2020-06-26 PROCEDURE — 6360000002 HC RX W HCPCS: Performed by: PHYSICIAN ASSISTANT

## 2020-06-26 PROCEDURE — 82805 BLOOD GASES W/O2 SATURATION: CPT

## 2020-06-26 PROCEDURE — 2580000003 HC RX 258: Performed by: INTERNAL MEDICINE

## 2020-06-26 PROCEDURE — 82962 GLUCOSE BLOOD TEST: CPT

## 2020-06-26 PROCEDURE — 80048 BASIC METABOLIC PNL TOTAL CA: CPT

## 2020-06-26 PROCEDURE — 85027 COMPLETE CBC AUTOMATED: CPT

## 2020-06-26 RX ORDER — LOSARTAN POTASSIUM 25 MG/1
25 TABLET ORAL DAILY
Qty: 30 TABLET | Refills: 2 | Status: SHIPPED | OUTPATIENT
Start: 2020-06-26 | End: 2021-08-10

## 2020-06-26 RX ORDER — INSULIN GLARGINE 100 [IU]/ML
30 INJECTION, SOLUTION SUBCUTANEOUS NIGHTLY
Status: DISCONTINUED | OUTPATIENT
Start: 2020-06-26 | End: 2020-06-26 | Stop reason: HOSPADM

## 2020-06-26 RX ORDER — POTASSIUM CHLORIDE 20 MEQ/1
40 TABLET, EXTENDED RELEASE ORAL ONCE
Status: COMPLETED | OUTPATIENT
Start: 2020-06-26 | End: 2020-06-26

## 2020-06-26 RX ORDER — GLUCOSAMINE HCL/CHONDROITIN SU 500-400 MG
CAPSULE ORAL
Qty: 100 STRIP | Refills: 1 | Status: SHIPPED | OUTPATIENT
Start: 2020-06-26

## 2020-06-26 RX ORDER — PREGABALIN 150 MG/1
150 CAPSULE ORAL 3 TIMES DAILY
Qty: 42 CAPSULE | Refills: 0 | Status: SHIPPED | OUTPATIENT
Start: 2020-06-26 | End: 2021-03-03

## 2020-06-26 RX ORDER — LANCETS 30 GAUGE
1 EACH MISCELLANEOUS 3 TIMES DAILY
Qty: 300 EACH | Refills: 2 | Status: SHIPPED | OUTPATIENT
Start: 2020-06-26

## 2020-06-26 RX ADMIN — POTASSIUM CHLORIDE 10 MEQ: 7.46 INJECTION, SOLUTION INTRAVENOUS at 13:49

## 2020-06-26 RX ADMIN — Medication 800 MG: at 11:38

## 2020-06-26 RX ADMIN — ASPIRIN 325 MG ORAL TABLET 325 MG: 325 PILL ORAL at 09:11

## 2020-06-26 RX ADMIN — POTASSIUM CHLORIDE 40 MEQ: 1500 TABLET, EXTENDED RELEASE ORAL at 15:24

## 2020-06-26 RX ADMIN — POTASSIUM CHLORIDE 10 MEQ: 7.46 INJECTION, SOLUTION INTRAVENOUS at 12:49

## 2020-06-26 RX ADMIN — SODIUM CHLORIDE, PRESERVATIVE FREE 10 ML: 5 INJECTION INTRAVENOUS at 09:12

## 2020-06-26 RX ADMIN — PANTOPRAZOLE SODIUM 40 MG: 40 INJECTION, POWDER, FOR SOLUTION INTRAVENOUS at 09:11

## 2020-06-26 RX ADMIN — POTASSIUM CHLORIDE 10 MEQ: 7.46 INJECTION, SOLUTION INTRAVENOUS at 15:19

## 2020-06-26 RX ADMIN — POTASSIUM CHLORIDE 10 MEQ: 7.46 INJECTION, SOLUTION INTRAVENOUS at 11:39

## 2020-06-26 RX ADMIN — ENOXAPARIN SODIUM 40 MG: 40 INJECTION SUBCUTANEOUS at 09:11

## 2020-06-26 RX ADMIN — POTASSIUM CHLORIDE 10 MEQ: 7.46 INJECTION, SOLUTION INTRAVENOUS at 09:11

## 2020-06-26 RX ADMIN — POTASSIUM CHLORIDE 40 MEQ: 1500 TABLET, EXTENDED RELEASE ORAL at 09:10

## 2020-06-26 RX ADMIN — INSULIN LISPRO 2 UNITS: 100 INJECTION, SOLUTION INTRAVENOUS; SUBCUTANEOUS at 18:39

## 2020-06-26 RX ADMIN — POTASSIUM CHLORIDE 10 MEQ: 7.46 INJECTION, SOLUTION INTRAVENOUS at 10:14

## 2020-06-26 ASSESSMENT — PAIN SCALES - GENERAL: PAINLEVEL_OUTOF10: 0

## 2020-06-26 NOTE — PROGRESS NOTES
Progress Note( Dr. Jennifer Cordon)  6/26/2020  Subjective:   Admit Date: 6/23/2020  PCP: Nathan Martin MD    Admitted For : Nausea vomiting abdominal pain and DKA    Consulted For: Better control of blood glucose    Interval History: Feels somewhat better not able to eat  Patient had EKG changes suggestive of ST MI by cardiology patient was taken to cardiac Cath Lab last night and cardiac cath was done and it was negative there is no significant coronary artery disease noted    Denies any chest pains,   Denies SOB . Denies nausea or vomiting. Likes to eat  No new bowel or bladder symptoms. Intake/Output Summary (Last 24 hours) at 6/26/2020 0648  Last data filed at 6/25/2020 1857  Gross per 24 hour   Intake 3401.7 ml   Output 980 ml   Net 2421.7 ml       DATA    CBC:   Recent Labs     06/23/20  0812 06/24/20  0914 06/25/20  0215   WBC 21.4* 22.9* 13.2*   HGB 18.9* 14.3 13.3    253 225    CMP:  Recent Labs     06/23/20  0812  06/23/20  1802 06/24/20  0235 06/25/20  0215   *   < > 139 136 139   K 5.4*   < > 4.3 3.5 3.2*   CL 93*   < > 108 114* 111*   CO2 2*   < > 6* 11* 19*   BUN 9   < > 10 11 6   CREATININE 0.7   < > 0.5* 0.4* 0.4*   CALCIUM 10.0   < > 9.6 9.0 9.0   PROT 9.3*  --   --   --   --    LABALBU 4.9  --   --   --   --    BILITOT 0.4  --   --   --   --    ALKPHOS 86  --   --   --   --    AST 10*  --   --   --   --    ALT 6*  --   --   --   --     < > = values in this interval not displayed.      Lipids:   Lab Results   Component Value Date    CHOL 205 12/11/2017    HDL 40 09/04/2019    TRIG 164 12/11/2017     Glucose:  Recent Labs     06/25/20  1636 06/25/20  2028 06/26/20  0210   POCGLU 139* 298* 97     [id number]:  Lab Results   Component Value Date    LABA1C 11.9 06/24/2020     High Sensitivity TSH: No results found for: TSHHS  Free T3: No results found for: FT3  Free T4:No results found for: T4FREE    Xr Chest Portable    Result Date: 6/23/2020  EXAMINATION: ONE XRAY VIEW OF THE CHEST

## 2020-06-26 NOTE — DISCHARGE SUMMARY
Discharge Summary Note  Patient ID:  Stevan Lucas  [id number]  30 y.o.  1995    Admit date: 6/23/2020    Discharge date and time: No discharge date for patient encounter. Admitting Physician: Queta Lopes MD     Discharge Physician: Royal Benja MD    Admission Diagnoses and Hospital Course:   Type 1 diabetes mellitus with ketoacidosis, uncontrolled ([address] [E10.10]  DKA, type 1, not at goal [facility]) [E10.10]    Discharge Diagnoses and Hospital Course:   Diabetic ketoacidosis, severe hyperglycemia and elevated anion gap metabolic acidosis in the setting of insulin-dependent type 1 diabetes mellitus  Blood glucose 464 on presentation  Likely secondary to non compliance  HbA1c 13.3  Anion gap 27 on presentation  On Insulin drip, plan to bridge and change to sub q insulin per endocrine  Continue to Monitor BMP  Diabetic diet   Dr Emmy Palencia recommendations appreciated  Blood culture ordered  Had left heart cath for abnormal EKG but with no CAD, STEMI ruled out     Metabolic acidosis with elevated anion gap  pH 6.9 with CO2 3  on presentation   Likely from DKA, will repeat VBG, much improved  Lactic acid slightly elevated but improved with fluids  Given 2 Amp Bicarb initially  HCO3 improved     Leukocytosis-Improved  Likely reactive  Blood culture ordered  Chest X ray un remarkable  Monitor off antibiotics     Hypertension  Continue Cozaar    Hypokalemia  Replacements ordered, being discharged after completing replacements    Hypophosphatemia and Hypo Magnesemia  Likely with Insulin drip  Replacements ordered    Complicated grief  With recnet loss of her mom, asked to follow up Psych as out patient.   Denies homicidal or suicidal ideations     Admission Condition: poor    Discharged Condition: stable    Consults: Endocrine    Significant Diagnostic Studies:   CBC with Differential:    Lab Results   Component Value Date    WBC 9.2 06/26/2020    RBC 4.50 06/26/2020    HGB 13.3 06/26/2020    HCT 37.8 06/26/2020     06/26/2020    MCV 84.0 06/26/2020    MCH 29.6 06/26/2020    MCHC 35.2 06/26/2020    RDW 10.9 06/26/2020    SEGSPCT 86.1 06/23/2020    BANDSPCT 2 12/28/2017    LYMPHOPCT 10.6 06/23/2020    MONOPCT 2.2 06/23/2020    EOSPCT 0.0 07/10/2011    BASOPCT 0.4 06/23/2020    MONOSABS 0.5 06/23/2020    LYMPHSABS 2.3 06/23/2020    EOSABS 0.0 06/23/2020    BASOSABS 0.1 06/23/2020    DIFFTYPE AUTOMATED DIFFERENTIAL 06/23/2020     CMP:    Lab Results   Component Value Date     06/26/2020    K 2.6 06/26/2020     06/26/2020    CO2 24 06/26/2020    BUN 3 06/26/2020    CREATININE 0.4 06/26/2020    GFRAA >60 06/26/2020    AGRATIO 1.6 09/04/2019    LABGLOM >60 06/26/2020    GLUCOSE 84 06/26/2020    PROT 9.3 06/23/2020    PROT 9.4 09/03/2012    LABALBU 4.9 06/23/2020    CALCIUM 8.6 06/26/2020    BILITOT 0.4 06/23/2020    ALKPHOS 86 06/23/2020    AST 10 06/23/2020    ALT 6 06/23/2020     Xr Chest Portable    Result Date: 6/23/2020  EXAMINATION: ONE XRAY VIEW OF THE CHEST 6/23/2020 8:11 am COMPARISON: None. HISTORY: Reason for Exam: sob Acuity: Acute Type of Exam: Initial Additional signs and symptoms: female that presents with shortness of breath and concern for elevated blood sugar. Patient reports that she was doing well up until yesterday when the above started. Patient noticed her blood sugar was running a little high last night at \"230\". This morning she reports that her breathing rate had increased and she felt some shortness of breath which she has had in the past when her blood sugar is markedly elevated FINDINGS: The lungs are without acute focal process. No effusion or pneumothorax. The cardiomediastinal silhouette is normal.  The osseous structures are intact without acute process.      Unremarkable chest.     Discharge Exam:  General Appearance: alert and oriented to person, place and time, in no acute distress  Cardiovascular: Tachycardia mild, regular rhythm, normal S1 and S2  Pulmonary/Chest: clear to auscultation bilaterally- no wheezes, rales or rhonchi, normal air movement, no respiratory distress  Abdomen: soft, non-tender, non-distended, normal bowel sounds, no masses   Extremities: no cyanosis, clubbing or edema, pulse   Skin: warm and dry, no rash or erythema; dry skin  Head: normocephalic and atraumatic; dry oral mucosa  Eyes: pupils equal, round, and reactive to light  Neck: supple and non-tender without mass, no thyromegaly   Musculoskeletal: normal range of motion, no joint swelling, deformity or tenderness  Neurological: alert, oriented, normal speech, no focal findings or movement disorder noted    Disposition: home    Patient Instructions:     Discharge Medications:   RodgerLinus Kendall   Home Medication Instructions FOA:[id number]    Printed on:06/26/20 5919   Medication Information                      blood glucose monitor strips  Test 3 times/d& as needed for symptoms of irregular blood glucose. Dispense sufficient amount to accommodate needs. insulin glargine (LANTUS) 100 UNIT/ML injection vial  Inject 40 Units into the skin nightly             insulin lispro (HUMALOG) 100 UNIT/ML injection vial  Inject 0-12 Units into the skin 3 times daily (with meals)             Lancets MISC  1 each by Does not apply route 3 times daily             losartan (COZAAR) 25 MG tablet  Take 1 tablet by mouth daily             pregabalin (LYRICA) 150 MG capsule  Take 1 capsule by mouth 3 times daily for 14 days.                  Activity: activity as tolerated    Diet: cardiac diet    Wound Care: none needed    Follow-up:  PCP 1-2 weeks, to establish, CM given list  Dr Elayne Bishop 2 weeks    Time Spent Doing discharge 35 min  Electronically signed by Corine Mcdaniel MD  on 6/26/20 at 11:41 AM EDT

## 2020-06-27 ENCOUNTER — CARE COORDINATION (OUTPATIENT)
Dept: CASE MANAGEMENT | Age: 25
End: 2020-06-27

## 2020-06-29 ENCOUNTER — CARE COORDINATION (OUTPATIENT)
Dept: CASE MANAGEMENT | Age: 25
End: 2020-06-29

## 2020-06-29 LAB
CULTURE: NORMAL
CULTURE: NORMAL
Lab: NORMAL
Lab: NORMAL
SPECIMEN: NORMAL
SPECIMEN: NORMAL

## 2020-07-16 ENCOUNTER — CARE COORDINATION (OUTPATIENT)
Dept: CASE MANAGEMENT | Age: 25
End: 2020-07-16

## 2020-07-16 NOTE — CARE COORDINATION
[facility] Transitions Follow Up Call    2020    Patient: Son Sanders  Patient : 1995   MRN: [id number]  Reason for Admission: DM-DKA  Discharge Date: 20 RARS: Readmission Risk Score: 15    2nd unsuccessful attempt to reach for Covid19 Risk Monitoring follow up call; message left requesting call back. Per protocol, episode closed and no further outreach plannned.       Yoel Stein RN

## 2020-10-12 NOTE — CONSULTS
CARDIOLOGY CONSULT NOTE   Reason for consultation:  STEMI    Referring physician:  Lissa Ortega MD     Primary care physician: Clara De MD      Dear Dr. Cande Marc  Thanks for the consult. History of present illness:Estela is a 30 y. o.year old who  presents with for shortness of breath which is moderate for 1 day,  intermittent, self limiting, not associated with cough or fever, gets worse with activity and better with rest,she has uncontrolled diabetes and is diagnosed with DKA, she has been tachycardic and received 2.5mg IVP lopressor, her EKG had dynamic changes in the precordial leads, she was admitted to ICU for DKA, on telemonitoring, her T waves were peaked, her EKG showed tall t wave in the precordial lead V3 AND THE developed st elevation in v3 and deep T wave inversion in v4-v6, SHE ALSO has possible LVH based on EKG criterion, although echo is not done. Chief Complaint   Patient presents with    Shortness of Breath    Hyperglycemia     bg for ems 527     Blood pressure, cholesterol, blood glucose and weight are well controlled. Past medical history:    has a past medical history of Chronic kidney disease, Chronic kidney disease (CKD) stage G1/A1, glomerular filtration rate (GFR) equal to or greater than 90 mL/min/1.73 square meter and albuminuria creatinine ratio less than 30 mg/g, Diabetes mellitus ([facility] [address] DKA (diabetic ketoacidoses) ([facility] [address] and DKA (diabetic ketoacidoses) ([facility] [address] Past surgical history:   has no past surgical history on file. Social History:   reports that she has never smoked. She has never used smokeless tobacco. She reports that she does not drink alcohol or use drugs.   Family history:   no family history of CAD, STROKE of DM    No Known Allergies    dextrose 50 % IV solution, PRN  potassium chloride 10 mEq/100 mL IVPB (Peripheral Line), PRN  magnesium sulfate 1 g in dextrose 5% 100 mL IVPB, PRN  sodium phosphate 10 mmol in dextrose 5 % 250 mL IVPB, PRN No pulsatile masses, no hepatosplenomegally, no bruits  : External genitalia appear normal, No masses or lesions. No discharge. No CVA tenderness. Musculoskeletal:  Intact distal pulses, No edema, No tenderness, No cyanosis, No clubbing. Good range of motion in all major joints. No tenderness to palpation or major deformities noted. Back- No tenderness. Integument:  Warm, Dry, No erythema, No rash. Skin: no rash, no ulcers  Lymphatic:  No lymphadenopathy noted. Neurologic:  Alert & oriented x 3, Normal motor function, Normal sensory function, No focal deficits noted. Psychiatric:  Affect normal, Judgment normal, Mood normal.   Lab Review   Recent Labs     06/23/20  0812   WBC 21.4*   HGB 18.9*   HCT 59.2*         Recent Labs     06/23/20  1454 06/23/20  1802    139   K 4.9 4.3    108   CO2 3* 6*   PHOS 4.1  --    BUN 11 10   CREATININE 0.5* 0.5*     Recent Labs     06/23/20  0812   AST 10*   ALT 6*   BILITOT 0.4   ALKPHOS 86     No results for input(s): TROPONINI in the last 72 hours. No results found for: BNP  Lab Results   Component Value Date    INR 0.97 05/31/2012    PROTIME 10.6 05/31/2012         EKG:sinus tachycardia, short CT, ST elevation in v3, T wave inversion in v4-v6    Chest Xray:NAD    ECHO:pending  Labs, echo, meds reviewed  Assessment: 30 y. o.year old with PMH of  has a past medical history of Chronic kidney disease, Chronic kidney disease (CKD) stage G1/A1, glomerular filtration rate (GFR) equal to or greater than 90 mL/min/1.73 square meter and albuminuria creatinine ratio less than 30 mg/g, Diabetes mellitus ([facility] [address] DKA (diabetic ketoacidoses) ([facility] [address] and DKA (diabetic ketoacidoses) ([facility] [address] Recommendations:    1. STEMI: cath lab activated, rapid covid test is sent, she has elevated WBC which could be from DKA and stress as well,further treatment as per angiographic findings of [facility], consent obtained  2. DKA: recommend insulin drip, IVF  3.  Critical care 39 OR

## 2020-11-06 ENCOUNTER — OFFICE VISIT (OUTPATIENT)
Dept: PHYSICAL MEDICINE AND REHAB | Age: 25
End: 2020-11-06
Payer: COMMERCIAL

## 2020-11-06 VITALS — TEMPERATURE: 97.5 F

## 2020-11-06 PROCEDURE — 95886 MUSC TEST DONE W/N TEST COMP: CPT | Performed by: PHYSICAL MEDICINE & REHABILITATION

## 2020-11-06 PROCEDURE — 95910 NRV CNDJ TEST 7-8 STUDIES: CPT | Performed by: PHYSICAL MEDICINE & REHABILITATION

## 2020-11-06 NOTE — PROGRESS NOTES
Normal None Normal Normal None Normal   Rect fem Normal None Normal Normal None Normal   Vast Med Normal None Normal Normal None Normal   Ant Tibialis Normal None Normal Normal None Normal   EHL Normal None Normal Normal None Normal   FDL Normal None Normal Normal None Normal   Gastroc Normal None Normal Normal None Normal   EDB Increased Occ Dec #, Polys Increased Occ Dec #, Polys   1st dors int Increased ++ Larger Increased ++ Normal, Larger       FINDINGS:   EMG of the lumbar paraspinals and both lower limbs demonstrated no paraspinal abnormalities but she was unable to relax those muscles well for the exam.  In the lower limbs, there were positive waves and fibrillations noted in the foot intrinsic muscles with greater changes distally. Mildly neuropathic motor unit changes were seen in the foot intrinsic muscles. She struggled with cooperating with more proximal needle exam, but no other muscle membrane irritability was clearly identified. Motor latencies were normal.  The left peroneal motor evoked amplitude was less than expected and the motor conduction velocity was slowed. The left tibial motor evoked amplitude and conduction velocity was more normal.  Surprisingly, I was able to record sensory responses in both lower limbs. Tibial H reflexes were asymmetric. IMPRESSION:      1. Abnormal EMG. Today's study was very poorly tolerated and I believe that impacted some of our recordings. My overall impression is that there is a peripheral neuropathy present, most clearly with motor involvement (but obviously sensory symptoms). Her preserved sensory responses during the testing today surprised me considering her presenting symptoms. 2.  I do not believe she has a focal spinal nerve root injury (radiculopathy) or plexopathy. 3.  No evidence of primary muscle disease.         Thank you for this interesting referral.

## 2021-03-03 ENCOUNTER — OFFICE VISIT (OUTPATIENT)
Dept: FAMILY MEDICINE CLINIC | Age: 26
End: 2021-03-03
Payer: COMMERCIAL

## 2021-03-03 VITALS
WEIGHT: 108.6 LBS | DIASTOLIC BLOOD PRESSURE: 70 MMHG | HEART RATE: 117 BPM | OXYGEN SATURATION: 98 % | BODY MASS INDEX: 21.32 KG/M2 | HEIGHT: 60 IN | SYSTOLIC BLOOD PRESSURE: 110 MMHG | TEMPERATURE: 98.7 F

## 2021-03-03 DIAGNOSIS — E10.42 TYPE 1 DIABETES MELLITUS WITH DIABETIC POLYNEUROPATHY (HCC): Primary | ICD-10-CM

## 2021-03-03 LAB
CHP ED QC CHECK: ABNORMAL
GLUCOSE BLD-MCNC: 412 MG/DL
HBA1C MFR BLD: 14 %

## 2021-03-03 PROCEDURE — 82962 GLUCOSE BLOOD TEST: CPT | Performed by: FAMILY MEDICINE

## 2021-03-03 PROCEDURE — 83036 HEMOGLOBIN GLYCOSYLATED A1C: CPT | Performed by: FAMILY MEDICINE

## 2021-03-03 PROCEDURE — 99203 OFFICE O/P NEW LOW 30 MIN: CPT | Performed by: FAMILY MEDICINE

## 2021-03-03 RX ORDER — GABAPENTIN 800 MG/1
800 TABLET ORAL DAILY
COMMUNITY
Start: 2021-01-21

## 2021-03-03 ASSESSMENT — PATIENT HEALTH QUESTIONNAIRE - PHQ9
SUM OF ALL RESPONSES TO PHQ QUESTIONS 1-9: 2
2. FEELING DOWN, DEPRESSED OR HOPELESS: 2
SUM OF ALL RESPONSES TO PHQ9 QUESTIONS 1 & 2: 2
SUM OF ALL RESPONSES TO PHQ QUESTIONS 1-9: 2
1. LITTLE INTEREST OR PLEASURE IN DOING THINGS: 0

## 2021-03-03 ASSESSMENT — ENCOUNTER SYMPTOMS
COUGH: 0
SHORTNESS OF BREATH: 0

## 2021-03-03 NOTE — PATIENT INSTRUCTIONS
Need help scheduling your covid vaccine? Call [facility] hotline: [phone] or go to [email or web address]    PLEASE BRING YOUR MEDICATIONS TO ALL APPOINTMENTS    The diagnoses and medications listed in this after visit summary may not be accurate at the time of check out. Please check MY CHART in 28-48 hours for possible corrections. Late cancellation policy: So that we can better accommodate people who are sick, please give our office 24 hour notice for an appointment cancellation. Thank you. Missed appointments: Your care is very important to us. It is important that you keep your scheduled appointments. Multiple missed appointments will lead to a dismissal from the office. Later arrival policy: If you are more than 10 minutes late for your appointment, you will be asked to reschedule. Please allow 5-7 business days for paperwork to be processed. It is important that you check your MY Chart messages, as they include appointment reminders, test results, and other important information. If you have forgotten your password, please call [phone]. HERE ARE SOME LIFE CHANGING TIPS      1. Take your blood pressure medications at bedtime. This reduces your chance of cardiovascular event by half  2. Fever in kids: It's best to give both Tylenol and Ibuprofen at the same time rather than staggering them which is confusing  3. Follow these tips to reduce childhood obesity: Reduce unnecessary exposure to antibiotics, consume whole milk instead of skim milk, watch public TV instead of regular TV (less exposure to junk food commercials), and reduce traumatic experiences. 4. 1 egg per day is good for your heart  5. Alternate day fasting does promote weight loss. 6. Skipping breakfast increases your risk of obesity  7. Artificially sweetened drinks increase all cause mortality (strokes, BMI, cardiovascular)  8.  Kale consumption can reduce onset of

## 2021-03-03 NOTE — LETTER
[facility]  [address]  Phone: [phone]  Fax: [phone]    Spencer Tubbs MD        March 3, 2021    Estela Perales  [facility]      Dear [facility] Severe:    Please get your covid vaccine ASAP. Estela Perales has Type 1 diabetes. If you have any questions or concerns, please don't hesitate to call.     Sincerely,        Spencer Tubbs MD

## 2021-03-03 NOTE — PROGRESS NOTES
Patient ID: Estela Perales 1995    . Chief Complaint   Patient presents with    [facility]    Diabetes    Weight Loss     losing weight         Diabetes  She presents for her initial diabetic visit. She has type 1 diabetes mellitus. Her disease course has been fluctuating. Pertinent negatives for hypoglycemia include no headaches. There are no hypoglycemic complications. Symptoms are stable. Diabetic complications include peripheral neuropathy. Current diabetic treatment includes insulin injections. Her weight is decreasing steadily. Her overall blood glucose range is 110-130 mg/dl. She sees a podiatrist.Eye exam is not current. WEight loss: used to weight 250 lbs  Review of Systems   Constitutional: Positive for unexpected weight change. Negative for chills, diaphoresis and fever. HENT:        No loss of taste or smell sensation   Respiratory: Negative for cough (no unusual) and shortness of breath (no unusual). Musculoskeletal: Negative for myalgias. Neurological: Negative for headaches. Patient Active Problem List   Diagnosis    [facility] (diabetic [facility]acidoses) [facility] [address]    Diabetic ketoacidosis ([facility] [address]    DKA, type 1, not at goal [facility])    DM (diabetes mellitus) ([facility] [address]    Proteinuria    Chronic kidney disease (CKD) stage G1/A1, glomerular filtration rate (GFR) equal to or greater than 90 mL/min/1.73 square meter and albuminuria creatinine ratio less than 30 mg/g    Severe malnutrition ([facility] [address]       No past surgical history on file.     Family History   Problem Relation Age of Onset    Diabetes Mother     High Blood Pressure Mother     Kidney Disease Mother     Vision Loss Mother     Diabetes Type 1  Father     Diabetes Type 1  Brother        Current Outpatient Medications on File Prior to Visit   Medication Sig Dispense Refill    gabapentin (NEURONTIN) 800 MG tablet       CINNAMON PO Take by mouth      Lancets MISC 1 each by Does not apply route 3 times daily 300 each 2    blood glucose monitor strips Test 3 times/d& as needed for symptoms of irregular blood glucose. Dispense sufficient amount to accommodate needs. 100 strip 1    insulin glargine (LANTUS) 100 UNIT/ML injection vial Inject 40 Units into the skin nightly 1 vial 0    insulin lispro (HUMALOG) 100 UNIT/ML injection vial Inject 0-12 Units into the skin 3 times daily (with meals) 1 vial 0    losartan (COZAAR) 25 MG tablet Take 1 tablet by mouth daily (Patient not taking: Reported on 3/3/2021) 30 tablet 2     No current facility-administered medications on file prior to visit. Objective:         Physical Exam  Vitals signs and nursing note reviewed. Constitutional:       General: She is not in acute distress. Appearance: Normal appearance. She is well-developed. HENT:      Head: Normocephalic and atraumatic. Right Ear: Hearing and external ear normal. There is impacted cerumen. Left Ear: Hearing, tympanic membrane and external ear normal.      Nose: Nose normal. No nasal deformity, laceration, mucosal edema or rhinorrhea. Mouth/Throat:      Pharynx: No oropharyngeal exudate or posterior oropharyngeal erythema. Eyes:      General: Lids are normal.      Conjunctiva/sclera: Conjunctivae normal.      Pupils: Pupils are equal, round, and reactive to light. Neck:      Musculoskeletal: Neck supple. Thyroid: No thyroid mass or thyromegaly. Trachea: No tracheal deviation. Cardiovascular:      Rate and Rhythm: Normal rate and regular rhythm. Heart sounds: Normal heart sounds, S1 normal and S2 normal. No friction rub. No gallop. Pulmonary:      Effort: Pulmonary effort is normal. No respiratory distress. Breath sounds: Normal breath sounds. No wheezing or rales. Abdominal:      General: There is no distension. Palpations: Abdomen is soft. There is no mass. Tenderness: There is no abdominal tenderness. Musculoskeletal:      Right lower leg: No edema. Left lower leg: No edema. Lymphadenopathy:      Cervical:      Right cervical: No superficial, deep or posterior cervical adenopathy. Left cervical: No superficial, deep or posterior cervical adenopathy. Skin:     General: Skin is warm and dry. Neurological:      Mental Status: She is alert and oriented to person, place, and time. Psychiatric:         Attention and Perception: She is attentive. Speech: Speech normal.         Behavior: Behavior is slowed. Thought Content: Thought content normal.         Judgment: Judgment normal.       Vitals:    03/03/21 1606   BP: 110/70   Site: Left Upper Arm   Position: Sitting   Cuff Size: Medium Adult   Pulse: 117   Temp: 98.7 °F (37.1 °C)   TempSrc: Infrared   SpO2: 98%   Weight: 108 lb 9.6 oz (49.3 kg)   Height: 5' (1.524 m)     Body mass index is 21.21 kg/m². Wt Readings from Last 3 Encounters:   03/03/21 108 lb 9.6 oz (49.3 kg)   06/26/20 124 lb (56.2 kg)   04/24/20 125 lb (56.7 kg)     BP Readings from Last 3 Encounters:   03/03/21 110/70   06/26/20 120/75   04/24/20 116/75          Results for orders placed or performed in visit on 03/03/21   POCT glycosylated hemoglobin (Hb A1C)   Result Value Ref Range    Hemoglobin A1C 14.0 %   POCT Glucose   Result Value Ref Range    Glucose 412 mg/dL    QC OK? The ASCVD Risk score (Elisabet Herrera, et al., 2013) failed to calculate for the following reasons: The 2013 ASCVD risk score is only valid for ages 36 to 78  Lab Review   No visits with results within 6 Month(s) from this visit. Latest known visit with results is:   No results displayed because visit has over 200 results. Assessment:       Diagnosis Orders   1. Type 1 diabetes mellitus with diabetic polyneuropathy (HCC)  POCT glycosylated hemoglobin (Hb A1C)    POCT Glucose    Ambulatory referral to Ophthalmology           Plan:      Diabetes is not controlled.   F/u endocrinologist ASAP!!!    See orders  Re-check prn    Reviewed chart. Patient weighed 116 lb las year, so has not lost significant amount of weight. Gave letter for her to get Mckenna Ozzie and Mckenna Ozzie vaccine. Return if symptoms worsen or fail to improve.

## 2021-03-29 LAB — DIABETIC RETINOPATHY: NEGATIVE

## 2021-08-10 ENCOUNTER — OFFICE VISIT (OUTPATIENT)
Dept: FAMILY MEDICINE CLINIC | Age: 26
End: 2021-08-10
Payer: COMMERCIAL

## 2021-08-10 VITALS
WEIGHT: 106.8 LBS | HEIGHT: 60 IN | SYSTOLIC BLOOD PRESSURE: 94 MMHG | HEART RATE: 109 BPM | OXYGEN SATURATION: 97 % | DIASTOLIC BLOOD PRESSURE: 62 MMHG | BODY MASS INDEX: 20.97 KG/M2 | TEMPERATURE: 97.3 F

## 2021-08-10 DIAGNOSIS — N91.1 AMENORRHEA, SECONDARY: Primary | ICD-10-CM

## 2021-08-10 LAB
CONTROL: NORMAL
PREGNANCY TEST URINE, POC: NEGATIVE

## 2021-08-10 PROCEDURE — 99213 OFFICE O/P EST LOW 20 MIN: CPT | Performed by: FAMILY MEDICINE

## 2021-08-10 PROCEDURE — G8420 CALC BMI NORM PARAMETERS: HCPCS | Performed by: FAMILY MEDICINE

## 2021-08-10 PROCEDURE — 36415 COLL VENOUS BLD VENIPUNCTURE: CPT | Performed by: FAMILY MEDICINE

## 2021-08-10 PROCEDURE — 81025 URINE PREGNANCY TEST: CPT | Performed by: FAMILY MEDICINE

## 2021-08-10 PROCEDURE — 1036F TOBACCO NON-USER: CPT | Performed by: FAMILY MEDICINE

## 2021-08-10 PROCEDURE — G8427 DOCREV CUR MEDS BY ELIG CLIN: HCPCS | Performed by: FAMILY MEDICINE

## 2021-08-10 RX ORDER — MEDROXYPROGESTERONE ACETATE 10 MG/1
10 TABLET ORAL DAILY
Qty: 10 TABLET | Refills: 0 | Status: SHIPPED | OUTPATIENT
Start: 2021-08-10 | End: 2021-10-12 | Stop reason: ALTCHOICE

## 2021-08-10 NOTE — PATIENT INSTRUCTIONS
Need help scheduling your covid vaccine? [address] [phone]       PLEASE BRING YOUR MEDICATIONS TO ALL APPOINTMENTS    The diagnoses and medications listed in this after visit summary may not be accurate at the time of check out. Please check MY CHART in 28-48 hours for possible corrections. Late cancellation policy: So that we can better accommodate people who are sick, please give our office 24 hour notice for an appointment cancellation. Thank you. Missed appointments: Your care is very important to us. It is important that you keep your scheduled appointments. Multiple missed appointments will lead to a dismissal from the office. Later arrival policy: If you are more than 10 minutes late for your appointment, you will be asked to re-schedule. Please allow 5-7 business days for paperwork to be processed. It is important that you check your MY Chart messages, as they include appointment reminders, test results, and other important information. If you have forgotten your password, please call [phone]. HERE ARE SOME LIFE CHANGING TIPS      1. Take your blood pressure medications at bedtime. This reduces your chance of cardiovascular event by half  2. Fever in kids:  Give both Tylenol and Ibuprofen at the same time rather than staggering them which is confusing  3. Follow these tips to reduce childhood obesity: Reduce unnecessary exposure to antibiotics, consume whole milk instead of skim milk, watch public TV instead of regular TV (less exposure to junk food commercials), and reduce traumatic experiences. 4. 1 egg per day is good for your heart  5. Alternate day fasting does promote weight loss. Skipping breakfast increases your risk of obesity  6. Artificially sweetened drinks increase all cause mortality (strokes, BMI, cardiovascular)  7. Kale consumption can reduce onset of dementia  8.  Walking at least 8000 steps per day and resistance exercise 2-3 x per week are good for your heart  9.  Brushing teeth 3 times per day can decrease chance of getting diabetes

## 2021-08-10 NOTE — PROGRESS NOTES
Patient ID: Crow Perales 1995    Chief Complaint   Patient presents with    Menstrual Problem     has not had a period since 2017, and she had one in 2018         HPI     Amenorrhea: No menstrual cycle since 2018. She relates this to when she got sick and was in the hospital.  Menarche was age 13. She had regular cycles up until 2017. She does have a history of type 1 diabetes and has not been controlled. Review of Systems    Patient Active Problem List   Diagnosis    [facility]) [facility]    Diabetic ketoacidosis [facility]    DKA, type 1, not at goal [facility])    DM (diabetes mellitus) ([facility]    Proteinuria    Chronic kidney disease (CKD) stage G1/A1, glomerular filtration rate (GFR) equal to or greater than 90 mL/min/1.73 square meter and albuminuria creatinine ratio less than 30 mg/g    Severe malnutrition ([facility] [facility]       No past surgical history on file. Family History   Problem Relation Age of Onset    Diabetes Mother     High Blood Pressure Mother     Kidney Disease Mother     Vision Loss Mother     Diabetes Type 1  Father     Diabetes Type 1  Brother        Current Outpatient Medications on File Prior to Visit   Medication Sig Dispense Refill    gabapentin (NEURONTIN) 800 MG tablet Take 800 mg by mouth 3 times daily. Dr. Joaquin Chan / [place] 1 each by Does not apply route 3 times daily 300 each 2    blood glucose monitor strips Test 3 times/d& as needed for symptoms of irregular blood glucose. Dispense sufficient amount to accommodate needs. 100 strip 1    insulin glargine (LANTUS) 100 UNIT/ML injection vial Inject 40 Units into the skin nightly 1 vial 0    insulin lispro (HUMALOG) 100 UNIT/ML injection vial Inject 0-12 Units into the skin 3 times daily (with meals) 1 vial 0     No current facility-administered medications on file prior to visit. Objective:           Physical Exam  Vitals and nursing note reviewed.    Constitutional: Comments: Thin build  Crying when informed she would be having a pelvic exam.  She is never had one before. She has never had intercourse. HENT:      Head: Normocephalic and atraumatic. Mouth/Throat:      Mouth: Mucous membranes are moist.      Comments: Face is not hirsute  Cardiovascular:      Rate and Rhythm: Normal rate and regular rhythm. Heart sounds: Normal heart sounds, S1 normal and S2 normal.   Pulmonary:      Effort: Pulmonary effort is normal. No respiratory distress. Breath sounds: Normal breath sounds. No wheezing. Musculoskeletal:      Cervical back: Neck supple. Skin:     General: Skin is warm and dry. Neurological:      Mental Status: She is alert. Vitals:    08/10/21 1220   BP: 94/62   Pulse: 109   Temp: 97.3 °F (36.3 °C)   TempSrc: Infrared   SpO2: 97%   Weight: 106 lb 12.8 oz (48.4 kg)   Height: 5' (1.524 m)     Body mass index is 20.86 kg/m². Wt Readings from Last 3 Encounters:   08/10/21 106 lb 12.8 oz (48.4 kg)   03/03/21 108 lb 9.6 oz (49.3 kg)   06/26/20 124 lb (56.2 kg)     BP Readings from Last 3 Encounters:   08/10/21 94/62   03/03/21 110/70   06/26/20 120/75          Results for orders placed or performed in visit on 08/10/21   POCT urine pregnancy   Result Value Ref Range    Preg Test, Ur negative     Control             Assessment:       Diagnosis Orders   1. Amenorrhea, secondary  POCT urine pregnancy    TSH with Reflex    Prolactin    FOLLICLE STIMULATING HORMONE    LUTEINIZING HORMONE    medroxyPROGESTERone (PROVERA) 10 MG tablet    GLUCOSE           Plan:      Pelvic exam deferred. Patient has secondary amenorrhea. We will give her Provera challenge. She may be amenorrheic because of her weight. Have encouraged her to talk to her endocrinologist who she sees for her diabetes about her amenorrhea as well. She likely needs improved diabetes control    Recheck in 2 weeks    Return in about 2 weeks (around 8/24/2021) for amenorrhea.

## 2021-08-11 LAB
FOLLICLE STIMULATING HORMONE: 4.8 MIU/ML
GLUCOSE BLD-MCNC: 355 MG/DL (ref 70–99)
LUTEINIZING HORMONE: 1.4 MIU/ML
PROLACTIN: 7 NG/ML
TSH REFLEX: 0.83 UIU/ML (ref 0.27–4.2)

## 2021-09-13 ENCOUNTER — OFFICE VISIT (OUTPATIENT)
Dept: FAMILY MEDICINE CLINIC | Age: 26
End: 2021-09-13
Payer: COMMERCIAL

## 2021-09-13 VITALS
SYSTOLIC BLOOD PRESSURE: 90 MMHG | HEART RATE: 109 BPM | HEIGHT: 60 IN | OXYGEN SATURATION: 96 % | TEMPERATURE: 98.6 F | BODY MASS INDEX: 21.36 KG/M2 | WEIGHT: 108.8 LBS | DIASTOLIC BLOOD PRESSURE: 58 MMHG

## 2021-09-13 DIAGNOSIS — E10.42 TYPE 1 DIABETES MELLITUS WITH DIABETIC POLYNEUROPATHY (HCC): ICD-10-CM

## 2021-09-13 DIAGNOSIS — N91.2 AMENORRHEA: Primary | ICD-10-CM

## 2021-09-13 DIAGNOSIS — R19.5 LOOSE STOOLS: ICD-10-CM

## 2021-09-13 DIAGNOSIS — R11.2 NAUSEA AND VOMITING, INTRACTABILITY OF VOMITING NOT SPECIFIED, UNSPECIFIED VOMITING TYPE: ICD-10-CM

## 2021-09-13 DIAGNOSIS — R11.0 NAUSEA: ICD-10-CM

## 2021-09-13 PROCEDURE — G8427 DOCREV CUR MEDS BY ELIG CLIN: HCPCS | Performed by: FAMILY MEDICINE

## 2021-09-13 PROCEDURE — 3046F HEMOGLOBIN A1C LEVEL >9.0%: CPT | Performed by: FAMILY MEDICINE

## 2021-09-13 PROCEDURE — 2022F DILAT RTA XM EVC RTNOPTHY: CPT | Performed by: FAMILY MEDICINE

## 2021-09-13 PROCEDURE — G8420 CALC BMI NORM PARAMETERS: HCPCS | Performed by: FAMILY MEDICINE

## 2021-09-13 PROCEDURE — 1036F TOBACCO NON-USER: CPT | Performed by: FAMILY MEDICINE

## 2021-09-13 PROCEDURE — 99213 OFFICE O/P EST LOW 20 MIN: CPT | Performed by: FAMILY MEDICINE

## 2021-09-13 RX ORDER — FAMOTIDINE 40 MG/1
40 TABLET, FILM COATED ORAL 2 TIMES DAILY
Qty: 60 TABLET | Refills: 0 | Status: SHIPPED | OUTPATIENT
Start: 2021-09-13 | End: 2021-11-30 | Stop reason: SDUPTHER

## 2021-09-13 RX ORDER — CALCIUM POLYCARBOPHIL 625 MG
625 TABLET ORAL DAILY
Qty: 60 TABLET | Refills: 0 | Status: SHIPPED | OUTPATIENT
Start: 2021-09-13 | End: 2021-11-30

## 2021-09-13 NOTE — PATIENT INSTRUCTIONS
Need help scheduling your covid vaccine? [address] [phone]       PLEASE BRING YOUR MEDICATIONS TO ALL APPOINTMENTS    The diagnoses and medications listed in this after visit summary may not be accurate at the time of check out. Please check MY CHART in 28-48 hours for possible corrections. Late cancellation policy: So that we can better accommodate people who are sick, please give our office 24 hour notice for an appointment cancellation. Thank you. Missed appointments: Your care is very important to us. It is important that you keep your scheduled appointments. Multiple missed appointments will lead to a dismissal from the office. Later arrival policy: If you are more than 10 minutes late for your appointment, you will be asked to re-schedule. Please allow 5-7 business days for paperwork to be processed. It is important that you check your MY Chart messages, as they include appointment reminders, test results, and other important information. If you have forgotten your password, please call [phone]. HERE ARE SOME LIFE CHANGING TIPS      1. Take your blood pressure medications at bedtime. This reduces your chance of cardiovascular event by half  2. Fever in kids:  Give both Tylenol and Ibuprofen at the same time rather than staggering them which is confusing  3. Follow these tips to reduce childhood obesity: Reduce unnecessary exposure to antibiotics, consume whole milk instead of skim milk, watch public TV instead of regular TV (less exposure to junk food commercials), and reduce traumatic experiences. 4. 1 egg per day is good for your heart  5. Alternate day fasting does promote weight loss. Skipping breakfast increases your risk of obesity  6. Artificially sweetened drinks increase all cause mortality (strokes, body mass index, cardiovascular disease)  7. Kale consumption can reduce onset of dementia  8.  Walking at least 8000 steps per day and resistance exercise 2-3 x per week are good for your heart  9. Brushing teeth 3 times per day can decrease chance of getting diabetes  10. Treatment of acute cough: honey for kids over the age of 3. Naproxen 500 mg 3 times per day in adults. Otherwise NOTHING ELSE WORKS for cough  11. Antibiotic use is associated with a lifetime increased risk of breast cancer and heart disease.

## 2021-09-13 NOTE — PROGRESS NOTES
Patient ID: Estela Perales 1995    . Chief Complaint   Patient presents with    Amenorrhea    Nausea & Vomiting     whenever she eats something. It does not agree with her stomach.  Diarrhea     whenever she eats         HPI     Amenorrhea: Patient says she is still taking her pills every day. She has not had a menstrual cycle    Nausea vomiting and diarrhea: On and off for about a month. Last week she had 1 day where she vomited 3 times. Sometimes after she eats she does not feel well. She also has loose stools with each meal.    Review of Systems    Patient Active Problem List   Diagnosis    [facility]di[facility]acidoses) [facility] [address] [facility]    Diabetic ketoacidosis ([facility] [facility]    Type 1 diabetes mellitus with diabetic polyneuropathy ([facility] [facility]    DM (diabetes mellitus) ([facility] [address] [facility]    Proteinuria    Chronic kidney disease (CKD) stage G1/A1, glomerular filtration rate (GFR) equal to or greater than 90 mL/min/1.73 square meter and albuminuria creatinine ratio less than 30 mg/g    Severe malnutrition (HCC)       No past surgical history on file. Family History   Problem Relation Age of Onset    Diabetes Mother     High Blood Pressure Mother     Kidney Disease Mother     Vision Loss Mother     Diabetes Type 1  Father     Diabetes Type 1  Brother        Current Outpatient Medications on File Prior to Visit   Medication Sig Dispense Refill    gabapentin (NEURONTIN) 800 MG tablet Take 800 mg by mouth 3 times daily. Dr. Luis A Hinton / Ramandeep [place] 1 each by Does not apply route 3 times daily 300 each 2    blood glucose monitor strips Test 3 times/d& as needed for symptoms of irregular blood glucose. Dispense sufficient amount to accommodate needs.  100 strip 1    insulin glargine (LANTUS) 100 UNIT/ML injection vial Inject 40 Units into the skin nightly 1 vial 0    insulin lispro (HUMALOG) 100 UNIT/ML injection vial Inject 0-12 Units into the skin 3 times daily (with meals) 1 vial 0    medroxyPROGESTERone (PROVERA) 10 MG tablet Take 1 tablet by mouth daily 10 tablet 0     No current facility-administered medications on file prior to visit. Objective:         Physical Exam  Vitals and nursing note reviewed. Constitutional:       Appearance: She is well-developed. HENT:      Head: Normocephalic and atraumatic. Neck:      Thyroid: No thyromegaly. Trachea: No tracheal deviation. Cardiovascular:      Rate and Rhythm: Normal rate and regular rhythm. Heart sounds: Normal heart sounds, S1 normal and S2 normal.   Pulmonary:      Effort: Pulmonary effort is normal. No respiratory distress. Breath sounds: Normal breath sounds. No wheezing. Abdominal:      General: Bowel sounds are increased. There is no distension. Palpations: Abdomen is soft. There is no mass. Tenderness: There is no abdominal tenderness. Musculoskeletal:      Cervical back: Neck supple. Skin:     General: Skin is warm and dry. Neurological:      Mental Status: She is alert. Vitals:    09/13/21 1556   BP: (!) 90/58   Pulse: 109   Temp: 98.6 °F (37 °C)   TempSrc: Infrared   SpO2: 96%   Weight: 108 lb 12.8 oz (49.4 kg)   Height: 5' (1.524 m)     Body mass index is 21.25 kg/m². Wt Readings from Last 3 Encounters:   09/13/21 108 lb 12.8 oz (49.4 kg)   08/10/21 106 lb 12.8 oz (48.4 kg)   03/03/21 108 lb 9.6 oz (49.3 kg)     BP Readings from Last 3 Encounters:   09/13/21 (!) 90/58   08/10/21 94/62   03/03/21 110/70          No results found for this visit on 09/13/21. The ASCVD Risk score (Brittany Joyce et al., 2013) failed to calculate for the following reasons:     The 2013 ASCVD risk score is only valid for ages 36 to 78  Lab Review   Abstract on 08/17/2021   Component Date Value    Sodium 03/01/2021 138     Chloride 03/01/2021 99     Potassium 03/01/2021 4.2     BUN 03/01/2021 7     CREATININE 03/01/2021 0.57     Glucose 03/01/2021 405     AST 03/01/2021 16     ALT 03/01/2021 11     Calcium 03/01/2021 9.5     Total Protein 03/01/2021 7.2     CO2 03/01/2021 26     Albumin 03/01/2021 4.1     Alkaline Phosphatase 03/01/2021 67     Total Bilirubin 03/01/2021 0.5     Anion Gap 03/01/2021 13    Abstract on 08/17/2021   Component Date Value    Cholesterol, Total 03/01/2021 193     HDL 03/01/2021 4*    LDL Calculated 03/01/2021 128     Triglycerides 03/01/2021 92     Chol/HDL Ratio 03/01/2021 2.7     VLDL 03/01/2021 18     Hemoglobin A1C 03/01/2021 15.6    Abstract on 08/17/2021   Component Date Value    Hemoglobin A1C 05/04/2021 15.5    Office Visit on 08/10/2021   Component Date Value    Preg Test, Ur 08/10/2021 negative     TSH 08/10/2021 0.83     Prolactin 08/10/2021 7.0     FSH 08/10/2021 4.8     LH 08/10/2021 1.4     Glucose 08/10/2021 355*     Lab Review   Abstract on 08/17/2021   Component Date Value    Sodium 03/01/2021 138     Chloride 03/01/2021 99     Potassium 03/01/2021 4.2     BUN 03/01/2021 7     CREATININE 03/01/2021 0.57     Glucose 03/01/2021 405     AST 03/01/2021 16     ALT 03/01/2021 11     Calcium 03/01/2021 9.5     Total Protein 03/01/2021 7.2     CO2 03/01/2021 26     Albumin 03/01/2021 4.1     Alkaline Phosphatase 03/01/2021 67     Total Bilirubin 03/01/2021 0.5     Anion Gap 03/01/2021 13    Abstract on 08/17/2021   Component Date Value    Cholesterol, Total 03/01/2021 193     HDL 03/01/2021 4*    LDL Calculated 03/01/2021 128     Triglycerides 03/01/2021 92     Chol/HDL Ratio 03/01/2021 2.7     VLDL 03/01/2021 18     Hemoglobin A1C 03/01/2021 15.6    Abstract on 08/17/2021   Component Date Value    Hemoglobin A1C 05/04/2021 15.5    Office Visit on 08/10/2021   Component Date Value    Preg Test, Ur 08/10/2021 negative     TSH 08/10/2021 0.83     Prolactin 08/10/2021 7.0     FSH 08/10/2021 4.8     LH 08/10/2021 1.4     Glucose 08/10/2021 355*             Assessment:       Diagnosis Orders   1.  [address] Obstetrics [facility] Gynecology   2. Nausea  famotidine (PEPCID) 40 MG tablet   3. Nausea and vomiting, intractability of vomiting not specified, unspecified vomiting type     4. Loose stools  Calcium Polycarbophil (FIBER) 625 MG TABS   5. Type 1 diabetes mellitus with diabetic polyneuropathy ([facility] [address]             Plan:      See orders. Think patient needs to see gynecologist.  I questioned her in regards to the Provera. I only gave her 10 pills. She should not still be taking this medication at this time. Patient seems to be uncertain about her medication    We will give Pepcid for the nausea and fiber for the loose stools. Recheck in 1 month. F/u endocrinologist for the diabetes 1  Return in about 1 month (around 10/13/2021) for nausea, vomiting, diarrhea.

## 2021-10-11 LAB — DIABETIC RETINOPATHY: POSITIVE

## 2021-10-12 ENCOUNTER — OFFICE VISIT (OUTPATIENT)
Dept: FAMILY MEDICINE CLINIC | Age: 26
End: 2021-10-12
Payer: COMMERCIAL

## 2021-10-12 VITALS
TEMPERATURE: 98.1 F | SYSTOLIC BLOOD PRESSURE: 110 MMHG | DIASTOLIC BLOOD PRESSURE: 60 MMHG | WEIGHT: 106.8 LBS | OXYGEN SATURATION: 96 % | BODY MASS INDEX: 20.86 KG/M2 | HEART RATE: 113 BPM

## 2021-10-12 DIAGNOSIS — E10.42 TYPE 1 DIABETES MELLITUS WITH DIABETIC POLYNEUROPATHY (HCC): Primary | ICD-10-CM

## 2021-10-12 LAB — HBA1C MFR BLD: 14 %

## 2021-10-12 PROCEDURE — 3046F HEMOGLOBIN A1C LEVEL >9.0%: CPT | Performed by: FAMILY MEDICINE

## 2021-10-12 PROCEDURE — G8427 DOCREV CUR MEDS BY ELIG CLIN: HCPCS | Performed by: FAMILY MEDICINE

## 2021-10-12 PROCEDURE — 1036F TOBACCO NON-USER: CPT | Performed by: FAMILY MEDICINE

## 2021-10-12 PROCEDURE — 99214 OFFICE O/P EST MOD 30 MIN: CPT | Performed by: FAMILY MEDICINE

## 2021-10-12 PROCEDURE — 83036 HEMOGLOBIN GLYCOSYLATED A1C: CPT | Performed by: FAMILY MEDICINE

## 2021-10-12 PROCEDURE — G8484 FLU IMMUNIZE NO ADMIN: HCPCS | Performed by: FAMILY MEDICINE

## 2021-10-12 PROCEDURE — G8420 CALC BMI NORM PARAMETERS: HCPCS | Performed by: FAMILY MEDICINE

## 2021-10-12 PROCEDURE — 2022F DILAT RTA XM EVC RTNOPTHY: CPT | Performed by: FAMILY MEDICINE

## 2021-10-12 RX ORDER — FLASH GLUCOSE SENSOR
KIT MISCELLANEOUS
Qty: 1 EACH | Refills: 11 | Status: SHIPPED | OUTPATIENT
Start: 2021-10-12 | End: 2021-12-17 | Stop reason: SDUPTHER

## 2021-10-12 RX ORDER — FLASH GLUCOSE SCANNING READER
EACH MISCELLANEOUS
Qty: 1 EACH | Refills: 11 | Status: SHIPPED | OUTPATIENT
Start: 2021-10-12

## 2021-10-12 NOTE — PATIENT INSTRUCTIONS
PLEASE BRING YOUR MEDICATIONS TO ALL APPOINTMENTS    The diagnoses and medications listed in this after visit summary may not be accurate at the time of check out. Please check MY CHART in 28-48 hours for possible corrections. Late cancellation policy: So that we can better accommodate people who are sick, please give our office 24 hour notice for an appointment cancellation. Thank you. Missed appointments: Your care is very important to us. It is important that you keep your scheduled appointments. Multiple missed appointments will lead to a dismissal from the office. Later arrival policy: If you are more than 10 minutes late for your appointment, you will be asked to re-schedule. Please allow 5-7 business days for paperwork to be processed. It is important that you check your MY Chart messages, as they include appointment reminders, test results, and other important information. If you have forgotten your password, please call [phone]. HERE ARE SOME LIFE CHANGING TIPS      1. Take your blood pressure medications at bedtime to reduce your chance of heart attack or stroke  2. Fever in kids:  Give both Tylenol and Ibuprofen at the same time rather than staggering them   3. Follow these tips to reduce childhood obesity: Reduce unnecessary exposure to antibiotics, consume whole milk instead of skim milk, watch public TV instead of regular TV (less exposure to junk food commercials), and reduce traumatic experiences. 4. 1 egg per day is good for your heart  5. Alternate day fasting does promote weight loss. Skipping breakfast increases your risk of obesity  6. Artificially sweetened drinks increase all cause mortality (strokes, body mass index, cardiovascular disease)  7. Kale consumption can reduce onset of dementia  8. Walking at least 8000 steps per day and resistance exercise 2-3 x per week are good for your heart  9.  Brushing teeth 3 times per day can decrease chance of

## 2021-10-12 NOTE — Clinical Note
This is patient is begging for me to address her type 1 diabetes. She is wanting to switch from the endocrinologist in town who she is currently seeing. She has begged him for a CGM. I have referred her to you.

## 2021-10-12 NOTE — PROGRESS NOTES
Patient ID: Shakira Hurtado 1995    Chief Complaint   Patient presents with    Nausea & Vomiting     1 mth follow up     Diarrhea         HPI     Diarrhea resolved    Nausea vomiting: Has resolved with Pepcid    Type 1 diabetes: She has had diabetes since she was a child. She currently sees an endocrinologist.  She feels like she does not get enough attention from him. She is frustrated and is wanting to switch doctors. Her sugars are typically in the 300s. She cannot recall her last hemoglobin A1c. Yesterday her blood sugar was over 300. She typically checks 4 times a day. She is on Lantus and she takes mealtime insulin. She adjust the insulin when it needs to be adjusted. Her father told her to drink more shakes and eat potatatoes since she wants to gain weight and can't. She has been unable to gain weight and is concerned that her diabetes is making her underweight. Review of Systems    Patient Active Problem List   Diagnosis    DKA (diabetic ketoacidoses)    Diabetic ketoacidosis (HCC)    Type 1 diabetes mellitus with diabetic polyneuropathy (HCC)    DM (diabetes mellitus) ([address]    Proteinuria    Chronic kidney disease (CKD) stage G1/A1, glomerular filtration rate (GFR) equal to or greater than 90 mL/min/1.73 square meter and albuminuria creatinine ratio less than 30 mg/g    Severe malnutrition ([address]       No past surgical history on file.     Family History   Problem Relation Age of Onset    Diabetes Mother     High Blood Pressure Mother     Kidney Disease Mother     Vision Loss Mother     Diabetes Type 1  Father     Diabetes Type 1  Brother        Current Outpatient Medications on File Prior to Visit   Medication Sig Dispense Refill    famotidine (PEPCID) 40 MG tablet Take 1 tablet by mouth 2 times daily 60 tablet 0    Calcium Polycarbophil (FIBER) 625 MG TABS Take 1 tablet by mouth daily 60 tablet 0    gabapentin (NEURONTIN) 800 MG tablet Take 800 mg by mouth 3 times daily. Dr. Naomi Hagen / [place] 1 each by Does not apply route 3 times daily 300 each 2    blood glucose monitor strips Test 3 times/d& as needed for symptoms of irregular blood glucose. Dispense sufficient amount to accommodate needs. 100 strip 1    insulin glargine (LANTUS) 100 UNIT/ML injection vial Inject 40 Units into the skin nightly 1 vial 0    insulin lispro (HUMALOG) 100 UNIT/ML injection vial Inject 0-12 Units into the skin 3 times daily (with meals) 1 vial 0     No current facility-administered medications on file prior to visit. Objective:           Physical Exam  Vitals and nursing note reviewed. Constitutional:       Appearance: She is well-developed. HENT:      Head: Normocephalic and atraumatic. Cardiovascular:      Rate and Rhythm: Normal rate and regular rhythm. Heart sounds: Normal heart sounds, S1 normal and S2 normal.   Pulmonary:      Effort: Pulmonary effort is normal. No respiratory distress. Breath sounds: Normal breath sounds. No wheezing. Musculoskeletal:      Cervical back: Neck supple. Skin:     General: Skin is warm and dry. Neurological:      Mental Status: She is alert. Vitals:    10/12/21 1546   BP: 110/60   Site: Left Upper Arm   Position: Sitting   Cuff Size: Medium Adult   Pulse: 113   Temp: 98.1 °F (36.7 °C)   TempSrc: Temporal   SpO2: 96%   Weight: 106 lb 12.8 oz (48.4 kg)     Body mass index is 20.86 kg/m².      Wt Readings from Last 3 Encounters:   10/12/21 106 lb 12.8 oz (48.4 kg)   09/13/21 108 lb 12.8 oz (49.4 kg)   08/10/21 106 lb 12.8 oz (48.4 kg)     BP Readings from Last 3 Encounters:   10/12/21 110/60   09/13/21 (!) 90/58   08/10/21 94/62          Results for orders placed or performed in visit on 10/12/21   POCT glycosylated hemoglobin (Hb A1C)   Result Value Ref Range    Hemoglobin A1C 14.0 %       Lab Results   Component Value Date    LABA1C 14.0 10/12/2021    LABA1C 15.5 05/04/2021    LABA1C 14.0 03/03/2021     Lab Results   Component Value Date    GLUF 397 (H) 09/04/2019    LABMICR Not Indicated 03/11/2019    [address] 03/01/2021    CREATININE 0.57 03/01/2021       Assessment:       Diagnosis Orders   1. Type 1 diabetes mellitus with diabetic polyneuropathy ([facility] [address]  [facility] (Ambulatory)    POCT glycosylated hemoglobin (Hb A1C)    glucose 4 g chewable tablet           Plan:      Documentation: check sugars 4 times per day  Treated with insulin: insulin 3 times per day or on insulin using an on insulin treatment plan that requires frequent adjustment of insulin dosing. Patient will return in 1 week with a list of her blood sugar readings. She is definitely interested in meeting with the diabetic educator. Patient does not mentioning any low sugar reactions so I am wondering if she recognizes when she is hypoglycemic. I believe she is cognitively able to use the continuous glucose monitor. Diabetes is definitely out-of-control patient is at risk of premature death from her diabetes. Type of CGM ordered: Freestyle Sahil 2 14 day    Documentation of patient necessity is as follows:    Number of daily insulin injection(s): 4  OR Patient has an insulin pump No    Patient is prescribed to check home glucose 4 times per day. Most recent A1C value: 14    Range of glucose levels: 300+    How many hypoglycemia events has occurred in the last month: none  Is the patient hypo unaware? Yes    Is the patient actively participating in a care plan to manage their diabetes: Yes    This patient is cognitively able to use this technology? Yes    Does this patient have any other medical issues that would require a CGM vs traditional glucometer testing at home (dexterity, amputation, visual deficit, etc)? no    Will obtain the last consult note from her endocrinologist    Re-check in 1 week.   She is to continue checking her sugars 4 times per day and bring the readings with her.    Time writing note and researching CGM and seeing patient: 30 minutes    Return in about 1 month (around 11/12/2021) for DM, Bring sugars.

## 2021-10-27 ENCOUNTER — OFFICE VISIT (OUTPATIENT)
Dept: FAMILY MEDICINE CLINIC | Age: 26
End: 2021-10-27

## 2021-10-27 DIAGNOSIS — E10.42 TYPE 1 DIABETES MELLITUS WITH DIABETIC POLYNEUROPATHY (HCC): Primary | ICD-10-CM

## 2021-10-27 NOTE — PROGRESS NOTES
Patient here for instruction on how to set up and use CGM. Dr. Chary Partida has prescribed the [facility] 2. Patient has been testing up to 4x daily in addition to multiple daily injections. Patient reports she was diagnosed as Type1 at 5yrs old. 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 Patient reports hypoglycemic events as recently as this past week occurring 4x. She admits that she often skips breakfast.  Patient would like to attend diabetic education courses. She would consider an insulin pump (OmniPod) if covered by her insurance. Pt admits that she is genetically predisposed to diabetes, her mother and sister also with DM. Suspect that patient may have a learning disability, she was having difficulty comprehending when to change the [facility] sensor. Follow up appt made to complete new patient assessment for diabetes education. JESSICA Sorenson RN  Diabetes Educator  [address] [place] [address]  [phone] office  [phone] cell  [phone] fax  [email or web address]

## 2021-10-29 ENCOUNTER — OFFICE VISIT (OUTPATIENT)
Dept: FAMILY MEDICINE CLINIC | Age: 26
End: 2021-10-29

## 2021-10-29 DIAGNOSIS — E10.42 TYPE 1 DIABETES MELLITUS WITH DIABETIC POLYNEUROPATHY (HCC): Primary | ICD-10-CM

## 2021-10-29 NOTE — PROGRESS NOTES
Met with patient to remove and replace defective Libre2 sensor. Provided verbal cues for the patient to remove the defective sensor and re-educated on proper disposal.  Provided verbal cues to prepare the new sensor site and walked patient through how to prepare the sensor for application. Patient applied new sensor under supervision of this nurse. Patient was educated to call if this sensor is not functioning properly or has additional questions or concerns. Total time: 20 minutes    JESSICA Pitt RN  Diabetes Educator  [address] [place] [address]  [phone] office  [phone] cell  [phone] fax  [email or web address]

## 2021-11-30 ENCOUNTER — OFFICE VISIT (OUTPATIENT)
Dept: FAMILY MEDICINE CLINIC | Age: 26
End: 2021-11-30

## 2021-11-30 ENCOUNTER — OFFICE VISIT (OUTPATIENT)
Dept: FAMILY MEDICINE CLINIC | Age: 26
End: 2021-11-30
Payer: COMMERCIAL

## 2021-11-30 VITALS
HEIGHT: 60 IN | BODY MASS INDEX: 22.38 KG/M2 | DIASTOLIC BLOOD PRESSURE: 78 MMHG | WEIGHT: 114 LBS | SYSTOLIC BLOOD PRESSURE: 110 MMHG | HEART RATE: 113 BPM

## 2021-11-30 VITALS — TEMPERATURE: 97.1 F

## 2021-11-30 DIAGNOSIS — E10.42 TYPE 1 DIABETES MELLITUS WITH DIABETIC POLYNEUROPATHY (HCC): Primary | ICD-10-CM

## 2021-11-30 DIAGNOSIS — R11.0 NAUSEA: ICD-10-CM

## 2021-11-30 DIAGNOSIS — Z55.0 ILLITERATE: ICD-10-CM

## 2021-11-30 DIAGNOSIS — N18.1 TYPE 1 DIABETES MELLITUS WITH STAGE 1 CHRONIC KIDNEY DISEASE (HCC): ICD-10-CM

## 2021-11-30 DIAGNOSIS — E10.22 TYPE 1 DIABETES MELLITUS WITH STAGE 1 CHRONIC KIDNEY DISEASE (HCC): ICD-10-CM

## 2021-11-30 DIAGNOSIS — Z23 NEEDS FLU SHOT: ICD-10-CM

## 2021-11-30 DIAGNOSIS — Z23 NEED FOR 23-POLYVALENT PNEUMOCOCCAL POLYSACCHARIDE VACCINE: ICD-10-CM

## 2021-11-30 DIAGNOSIS — F79 INTELLECTUAL DISABILITY: ICD-10-CM

## 2021-11-30 LAB
CREATININE URINE: 121.2 MG/DL (ref 28–259)
MICROALBUMIN UR-MCNC: 1.4 MG/DL
MICROALBUMIN/CREAT UR-RTO: 11.6 MG/G (ref 0–30)

## 2021-11-30 PROCEDURE — 3046F HEMOGLOBIN A1C LEVEL >9.0%: CPT | Performed by: FAMILY MEDICINE

## 2021-11-30 PROCEDURE — G8482 FLU IMMUNIZE ORDER/ADMIN: HCPCS | Performed by: FAMILY MEDICINE

## 2021-11-30 PROCEDURE — 90472 IMMUNIZATION ADMIN EACH ADD: CPT | Performed by: FAMILY MEDICINE

## 2021-11-30 PROCEDURE — 99214 OFFICE O/P EST MOD 30 MIN: CPT | Performed by: FAMILY MEDICINE

## 2021-11-30 PROCEDURE — G8427 DOCREV CUR MEDS BY ELIG CLIN: HCPCS | Performed by: FAMILY MEDICINE

## 2021-11-30 PROCEDURE — 2022F DILAT RTA XM EVC RTNOPTHY: CPT | Performed by: FAMILY MEDICINE

## 2021-11-30 PROCEDURE — 1036F TOBACCO NON-USER: CPT | Performed by: FAMILY MEDICINE

## 2021-11-30 PROCEDURE — 90674 CCIIV4 VAC NO PRSV 0.5 ML IM: CPT | Performed by: FAMILY MEDICINE

## 2021-11-30 PROCEDURE — 90471 IMMUNIZATION ADMIN: CPT | Performed by: FAMILY MEDICINE

## 2021-11-30 PROCEDURE — 90732 PPSV23 VACC 2 YRS+ SUBQ/IM: CPT | Performed by: FAMILY MEDICINE

## 2021-11-30 PROCEDURE — G8420 CALC BMI NORM PARAMETERS: HCPCS | Performed by: FAMILY MEDICINE

## 2021-11-30 RX ORDER — INSULIN GLARGINE 100 [IU]/ML
40 INJECTION, SOLUTION SUBCUTANEOUS NIGHTLY
Qty: 2 EACH | Refills: 2 | Status: SHIPPED | OUTPATIENT
Start: 2021-11-30 | End: 2022-01-11 | Stop reason: SDUPTHER

## 2021-11-30 RX ORDER — FAMOTIDINE 40 MG/1
40 TABLET, FILM COATED ORAL 2 TIMES DAILY
Qty: 60 TABLET | Refills: 2 | Status: SHIPPED | OUTPATIENT
Start: 2021-11-30 | End: 2022-02-22 | Stop reason: SDUPTHER

## 2021-11-30 RX ORDER — IBUPROFEN 200 MG
1 TABLET ORAL
Qty: 200 EACH | Refills: 11 | Status: SHIPPED | OUTPATIENT
Start: 2021-11-30 | End: 2022-01-31 | Stop reason: SDUPTHER

## 2021-11-30 SDOH — EDUCATIONAL SECURITY - EDUCATION ATTAINMENT: ILITERACY AND LOW LEVEL LITERACY: Z55.0

## 2021-11-30 NOTE — PROGRESS NOTES
[facility] Diabetes Health  Diabetes Self-Management Education & Support Program    Reason for Referral: uncontrolled Type 1 diabetes  Referral Source: Dr. Dana Zamora requested: Individual & group SELF MGT & DIET education, assessment & referring provider contacted to manage medications and Continuous glucose monitor (CGM)    ASSESSMENT    From my perspective, the participant would benefit from [facility] specifically related to Disease Process, Healthy Eating, Being Active, Monitoring, Taking Medications, Healthy Coping, Reducing Risks and Problem Solving. Will adapt DSMES program to build on participant's current knowledge as noted in the Diabetes Skills, Confidence, and Preparedness Index. During the program, we will focus on providing DSMES that specifically addresses participant's interest in Healthy Eating, Monitoring, Taking Medications, Healthy Coping, Reducing Risks and Problem Solving, as shown by their reported readiness to change. The participant would be best served by attending individual sessions. Diabetes Self-Management Education Follow-up Visit: TBS       Clinical Presentation  Estela Ruiz is a 27 y.o.  female referred for diabetes self-management education. Participant has Type 1 uncontrolled for 18 years. Family history positive for diabetes. Patient reports DSMES services was received in the past. 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  Patient does not take any insulin if she doesn't eat. With lunch, she usually takes 20 units. With dinner, she usually takes 25 units. Lantus injection vial 40 units nightly    Blood Pressure Management  n/a    Lipid Management   N/a    Clot Prevention  n/a    Learning Assessment  Learning objectives Educator assessment (11/30/2021)   Diabetes Disease Process  The participant can   A) describe diabetes in basic terms;   B) state the type of diabetes they have; &   C) state accepted blood glucose targets. Healthy Eating  The participant can   A) identify carbohydrate foods; &   B) accurately read food labels. Being Active  The participant can  A) state the benefits of physical activity;  B) report their current PA practices;  C) identify PA they would consider incorporating in their lives; &  D) develop an implementation plan. Monitoring  The participant can  A) operate their blood glucose meter; &  B) describe how they log their blood glucoses to share with their provider. Taking Medications  The participant can  A) name their diabetes medications;  B) state the purpose and dose;  C) note side effects; &  D) describe proper storage, disposal & transport (if appropriate). Healthy Coping  The participant can    A) describe their response to diabetes diagnosis; B) describe their specific coping mechanisms;  C) identify supportive people and/or other resources that positively support their diabetes self-care and health. Reducing Risks  The participant can describe the preventive measures used by providers to promote health and prevent diabetes complications. Problem Solving  The participant can   A) identify signs, symptoms & treatment of hypoglycemia;   B) identify signs, symptoms & treatment of hyperglycemia;  C) describe their sick day plan; &  D) identify BG patterns to discuss with their provider. No  Yes  No        No  No        No  No  No  No        No  No        Yes  No  No  No        No  No  No        No          No  No  No  No     Characteristics to Learning   Barriers to Learning   [] Cognitive loss  [x] Mental retardation       [] Psychiatric disorder  [] Visually impaired  [] Hearing loss                 [x] Low literacy  [x] Low health literacy  [] Language  [] Functional limitation  [] Pain   [x] Financial  [x] Transportation  [] None   Favorite Ways to Learn   [x] Lecture  [] Slides  [] Reading [x] Video-Internet  [] Cassettes/CDs/MP3's  [] Interactive Small Groups [] Other       Behavioral Assessment  Current self-care practices  Educator assessment (11/30/2021)   Healthy Eating  Current practices  24-hour Dietary Recall:  Breakfast: usually skips or sometimes will eat a sausage sandwich  Lunch: salad from where she works  Dinner: varies, chicken, greens  Snacks: not assessed  Beverages: not assessed  Alcohol: not assessed     Would benefit from [facility] related to Healthy Eating: Yes      Eats a carbohydrate controlled diet: No      Stage of change: action - ready to set action plan and implement. Being Active  Current practices  How many days during the past week have you performed physical activity where your heart beats faster and your breathing is harder than normal for 30 minutes or more?  0    How many days in a typical week do you perform activity such as this? 0     Would benefit from DSMES related to Being Active: Yes      Exercises 150 minutes/week: No      Stage of change: contemplation - ambivalent about change. 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 Monitoring  Current practices  Do you monitor your blood sugar? With difficulty, pt is trying to learn to use the Libre2    How often do you monitor? Other continuous monitoring with Sahil 2    What are the range of readings? + mg/dL      Do you know your last A1c measurement?    Yes    Do you know the meaning of the A1c? No     Would benefit from [facility] related to Monitoring: Yes      Uses BG readings to establish trends and understand BG patterns: No      Stage of change: action - ready to set action plan and implement. Taking Medication  Current practices  Do you understand what your diabetes medications do? No    How often do you miss doses of your diabetes medications? Sometimes    Can you afford your diabetes medications? Yes   Would benefit from [facility] related to Taking Medication: Yes      Takes medications consistently to receive full benefit: No      Stage of change: action - ready to set action plan and implement. Healthy Coping   Current state  Diabetes Skills, Confidence and Preparedness Index: Total score: 3.7  Skills: 2.3  Confidence: 4.3  Preparedness: 4.7   Would benefit from DSMES related to Healthy Coping: Yes      Identifies specific people, organizations,etc, that actively support their diabetes self-care efforts:   No      Stage of change: contemplation - ambivalent about change     Reducing Risks  Current state  Vaccines:  Influenza: not current    Pneumococcal: not current    Hepatitis: HepB-completed 1995    COVID19: 4/21/2021    Examinations:  Eye Exam: not assessed    Dental exam: not assessed    Foot exam: not assessed    Heart Protection:  BP Readings from Last 3 Encounters:   11/30/21 110/78   10/12/21 110/60   09/13/21 (!) 90/58         LDL Calculated (mg/dL)   Date Value   03/01/2021 128     LDL Direct (MG/DL)   Date Value   12/11/2017 158 (H)     Triglyceride, Fasting (mg/dL)   Date Value   09/04/2019 191 (H)     Triglycerides (mg/dL)   Date Value   03/01/2021 92     HDL (mg/dL)   Date Value   03/01/2021 4 (A)     Cholesterol, Total (mg/dL)   Date Value   03/01/2021 193         Kidney Protection:  Microscopic Examination (no units)   Date Value   03/11/2019 Not Indicated          Would benefit from [facility] related to Reducing Risks:  Yes      Actively participates in decision-making with provider regarding secondary prevention:  No        Stage of change: action - ready to set action plan and implement. Problem Solving  Current state  Hypoglycemia Management:  What are signs and symptoms of hypoglycemia that you experience? Shaky, dizzy, hungry and Patient is educated on these s/s of hypoglycemia    How do you prevent hypoglycemia? Take medications as directed, Eat regularly and Patient is educated on these suggested preventative measures    How do you treat hypoglycemia? 1/2 cup juice, 1/2 cup soda, 3-4 glucose tabs, glucose gel, hard candy and Patient is educated on these suggested treatment options    Hyperglycemia Management:  What are signs and symptoms of hyperglycemia that you experience? Patient is unable to identify s/s, excessive thirst, excessive urination, very hungry, sleepy/tired, vision changes, recurrent infections, slow to heal and Patient is educated on these s/s of hyperglycemia    How can you prevent hyperglycemia? Avoid skipping/missing doses of medications, Avoid eating more than usual, Avoid eating more carbs/concentrated sweets, Be more physically active, Use a sick day plan, Manage stress and Patient is educated on these suggested preventative measures    Sick Day Management:  What do you do differently on sick days? Pt does not have a current sick day plan    Pattern Management:  Do you notice blood glucose patterns when you look at the readings in your meter or logbook? No    How do you use the blood glucose readings from your meter or logbook? Pt is not able to identify a pattern or trend in glucose readings     Would benefit from [facility] related to Problem Solving: Yes      Articulates appropriate strategies to address hypoglycemia, hyperglycemia, sick day care and BG pattern: No    Stage of change: action - ready to set action plan and implement.    Note: Content derived from the American Association of Diabetes Educators' Diabetes Education Curriculum: A Guide to Successful Self-Management (2nd edition)      Discussed patient case with PCP in person this date. Confirmed that patient has a learning disability and is not able to read & comprehend health information other than the most basic of information I.e. a sliding scale for insulin. Concern for patient who can not verbalize any supportive people at home that can attend education sessions with her to reinforce diabetes management at home. She does work at a local cafe 6 days a week, she reported that she is currently dependent upon rides as her car is broke at this time. She understands the need for intensive education to manage her diabetes as previous education attempts were unsuccessful due to her learning disability.       Butch Ortega RN on 11/30/2021 at 3:32 PM    Time in appointment: 60 minutes

## 2021-11-30 NOTE — PROGRESS NOTES
Sahil 2 sensor inserted on back of right arm. Sensor initiated via [facility] 2 . Reviewed interstitial glucose versus capillary glucose. Instructed on  setting and set up the appropriate alarms. Time and date set on   70 for Low Alarm   250 for High Alarm. Explained the arrows and the meaning of their blood sugar. Patient instructed the system is water resistant and can shower. To reinforce sensor with tape as needed if it begins to peel away from the skin. Encouraged Estela to call Yusef Anderson or the Diabetes Nurse Educator with any questions. Estela voiced understanding of Sahil 2 instructions via teach back. RAMSES DaleyN RN  Diabetes Educator  [address] [facility]  [phone] office  [phone] cell  [phone] fax  [email or web address]

## 2021-11-30 NOTE — PATIENT INSTRUCTIONS
PLEASE BRING YOUR MEDICATIONS TO ALL APPOINTMENTS    The diagnoses and medications listed in this after visit summary may not be accurate at the time of check out. Please check MY CHART in 28-48 hours for possible corrections. Late cancellation policy: So that we can better accommodate people who are sick, please give our office 24 hour notice for an appointment cancellation. Thank you. Missed appointments: Your care is very important to us. It is important that you keep your scheduled appointments. Multiple missed appointments will lead to a dismissal from the office. Later arrival policy: If you are more than 10 minutes late for your appointment, you will be asked to re-schedule. Please allow 5-7 business days for paperwork to be processed. It is important that you check your MY Chart messages, as they include appointment reminders, test results, and other important information. If you have forgotten your password, please call [phone]. HERE ARE SOME LIFE CHANGING TIPS      1. Take your blood pressure medications at bedtime to reduce your chance of heart attack or stroke  2. Fever in kids:  Give both Tylenol and Ibuprofen at the same time rather than staggering them   3. Follow these tips to reduce childhood obesity: Reduce unnecessary exposure to antibiotics, consume whole milk instead of skim milk, watch public TV instead of regular TV (less exposure to junk food commercials), and reduce traumatic experiences. 4. 1 egg per day is good for your heart  5. Alternate day fasting does promote weight loss. Skipping breakfast increases your risk of obesity  6. Artificially sweetened drinks increase all cause mortality (strokes, body mass index, cardiovascular disease)  7. Kale consumption can reduce onset of dementia  8. Walking at least 8000 steps per day and resistance exercise 2-3 x per week are good for your heart  9.  Brushing teeth 3 times per day can decrease chance of getting diabetes  10. Antibiotic use is associated with a lifetime increased risk of breast cancer and heart disease.

## 2021-11-30 NOTE — PROGRESS NOTES
disability    Illiterate       No past surgical history on file. Family History   Problem Relation Age of Onset    Diabetes Mother     High Blood Pressure Mother     Kidney Disease Mother     Vision Loss Mother     Diabetes Type 1  Father     Diabetes Type 1  Brother        Current Outpatient Medications on File Prior to Visit   Medication Sig Dispense Refill    Continuous Blood Gluc Sensor (FREESTYLE LOU 2 SENSOR) MISC Use 6 times per day 1 each 11    Continuous Blood Gluc  (FREESTYLE LOU 2 READER) KELVIN Check 6 times per day 1 each 11    gabapentin (NEURONTIN) 800 MG tablet Take 800 mg by mouth 3 times daily. Dr. Noemi Salazar / Jaja Almaraz      glucose 4 g chewable tablet Take 4 tablets by mouth as needed for Low blood sugar (Patient not taking: Reported on 11/30/2021) 30 tablet 0    Lancets MISC 1 each by Does not apply route 3 times daily 300 each 2    blood glucose monitor strips Test 3 times/d& as needed for symptoms of irregular blood glucose. Dispense sufficient amount to accommodate needs. 100 strip 1     No current facility-administered medications on file prior to visit. Objective:         Physical Exam  Vitals and nursing note reviewed. Constitutional:       Appearance: She is well-developed. HENT:      Head: Normocephalic and atraumatic. Cardiovascular:      Rate and Rhythm: Normal rate and regular rhythm. Heart sounds: Normal heart sounds, S1 normal and S2 normal.   Pulmonary:      Effort: Pulmonary effort is normal. No respiratory distress. Breath sounds: Normal breath sounds. No wheezing. Musculoskeletal:      Cervical back: Neck supple. Skin:     General: Skin is warm and dry. Neurological:      Mental Status: She is alert. Mental status is at baseline. Psychiatric:         Mood and Affect: Mood normal.         Behavior: Behavior is slowed. Cognition and Memory: Cognition is impaired.        Vitals:    11/30/21 1456   BP: 110/78 Site: Left Upper Arm   Position: Sitting   Cuff Size: Medium Adult   Pulse: 113   Weight: 114 lb (51.7 kg)   Height: 5' (1.524 m)     Body mass index is 22.26 kg/m². Wt Readings from Last 3 Encounters:   21 114 lb (51.7 kg)   10/12/21 106 lb 12.8 oz (48.4 kg)   21 108 lb 12.8 oz (49.4 kg)     BP Readings from Last 3 Encounters:   21 110/78   10/12/21 110/60   21 (!) 90/58          Results for orders placed or performed in visit on 21   MICROALBUMIN / CREATININE URINE RATIO   Result Value Ref Range    Microalbumin, Random Urine 1.40 <2.0 mg/dL    Creatinine, Ur 121.2 28.0 - 259.0 mg/dL    Microalbumin Creatinine Ratio 11.6 0.0 - 30.0 mg/g     The ASCVD Risk score (Chitra Bhat, et al., 2013) failed to calculate for the following reasons: The 2013 ASCVD risk score is only valid for ages 36 to 78  Lab Review   Orders Only on 10/26/2021   Component Date Value    Diabetic Retinopathy 10/11/2021 Positive    Office Visit on 10/12/2021   Component Date Value    Hemoglobin A1C 10/12/2021 14.0            Assessment:       Diagnosis Orders   1. Type 1 diabetes mellitus with diabetic polyneuropathy (HCC)  MICROALBUMIN / CREATININE URINE RATIO     DIABETES FOOT EXAM    PNEUMOVAX 23 subcutaneous/IM (Pneumococcal polysaccharide vaccine 23-valent >= 1yo)    INSULIN SYRINGE .5CC/29G ([employer] INS SYR .5CC/29G) 29G X 1/2\" 0.5 ML MISC   2. Needs flu shot  INFLUENZA, MDCK QUADV, 2 YRS AND OLDER, IM, PF, PREFILL SYR OR SDV, 0.5ML (FLUCELVAX QUADV, PF)   3. Need for 23-polyvalent pneumococcal polysaccharide vaccine  PNEUMOVAX 23 subcutaneous/IM (Pneumococcal polysaccharide vaccine 23-valent >= 1yo)   4. Nausea  famotidine (PEPCID) 40 MG tablet   5. Illiterate     6. Intellectual disability             Plan:      Agree with diabetic educator. I did not realize patient was illiterate.   We have given her a mealtime sliding scale for the followin to 150 : No insulin    151-200: 5 units    201-249: 7 units    250-229: 9 units    300-349: 11 units    352 399: 13 units    400 and higher: Call     I spent significant amount of time discussing case with diabetic educator prior to seeing patient. I extensively quizzed patient on how much insulin to take 4 different blood sugar readings. She struggled with answering properly in the beginning however after 10-15 examples, she caught on. The plan is to have her to continue meeting with diabetic educator. We will see her in 6 weeks to go over blood sugar readings. Return in about 6 weeks (around 1/11/2022) for DM.

## 2021-12-01 PROBLEM — F79 INTELLECTUAL DISABILITY: Status: ACTIVE | Noted: 2021-12-01

## 2021-12-01 PROBLEM — Z55.0 ILLITERATE: Status: ACTIVE | Noted: 2021-12-01

## 2021-12-17 ENCOUNTER — FOLLOWUP TELEPHONE ENCOUNTER (OUTPATIENT)
Dept: FAMILY MEDICINE CLINIC | Age: 26
End: 2021-12-17

## 2021-12-17 RX ORDER — FLASH GLUCOSE SENSOR
KIT MISCELLANEOUS
Qty: 2 EACH | Refills: 11 | Status: SHIPPED | OUTPATIENT
Start: 2021-12-17

## 2021-12-17 NOTE — TELEPHONE ENCOUNTER
Patient is having difficulty getting her Lenora [facility] 2 sensors from the pharmacy. They are only giving her 1 at a time. I reviewed the order and we should write to dispense 2 sensors with 11 refills so she has enough for the whole month. If you could send a new script to the [facility] that would help. Thank you. RAMSES RodriguesN RN  Diabetes Educator  [address] [place] [address]  [phone] office  [phone] cell  [phone] fax  [email or web address]

## 2022-01-03 ENCOUNTER — TELEPHONE (OUTPATIENT)
Dept: FAMILY MEDICINE CLINIC | Age: 27
End: 2022-01-03

## 2022-01-03 NOTE — TELEPHONE ENCOUNTER
Patient stopped by the office today asking if she could speak with Dr. Cezar Romero. Patient was told that the doctor was busy with other patients right now. Patient stated that she wanted to hear from her that if it was okay to get the [place] booster. I told patient that Dr. Cezar Romero recommends that everyone should have the COVID shot and booster. Patient stated that was fine but she would rather hear it from the doctor it will make her feel better.  Please advise

## 2022-01-11 ENCOUNTER — OFFICE VISIT (OUTPATIENT)
Dept: FAMILY MEDICINE CLINIC | Age: 27
End: 2022-01-11
Payer: COMMERCIAL

## 2022-01-11 ENCOUNTER — NURSE ONLY (OUTPATIENT)
Dept: FAMILY MEDICINE CLINIC | Age: 27
End: 2022-01-11
Payer: COMMERCIAL

## 2022-01-11 VITALS
BODY MASS INDEX: 21.99 KG/M2 | TEMPERATURE: 97.8 F | HEART RATE: 97 BPM | SYSTOLIC BLOOD PRESSURE: 98 MMHG | HEIGHT: 60 IN | OXYGEN SATURATION: 98 % | DIASTOLIC BLOOD PRESSURE: 64 MMHG | WEIGHT: 112 LBS

## 2022-01-11 DIAGNOSIS — M26.609 TMJ (TEMPOROMANDIBULAR JOINT SYNDROME): ICD-10-CM

## 2022-01-11 DIAGNOSIS — E10.42 TYPE 1 DIABETES MELLITUS WITH DIABETIC POLYNEUROPATHY (HCC): Primary | ICD-10-CM

## 2022-01-11 PROCEDURE — 1036F TOBACCO NON-USER: CPT | Performed by: FAMILY MEDICINE

## 2022-01-11 PROCEDURE — 99214 OFFICE O/P EST MOD 30 MIN: CPT | Performed by: FAMILY MEDICINE

## 2022-01-11 PROCEDURE — 3046F HEMOGLOBIN A1C LEVEL >9.0%: CPT | Performed by: FAMILY MEDICINE

## 2022-01-11 PROCEDURE — G0108 DIAB MANAGE TRN  PER INDIV: HCPCS | Performed by: FAMILY MEDICINE

## 2022-01-11 PROCEDURE — G8420 CALC BMI NORM PARAMETERS: HCPCS | Performed by: FAMILY MEDICINE

## 2022-01-11 PROCEDURE — 2022F DILAT RTA XM EVC RTNOPTHY: CPT | Performed by: FAMILY MEDICINE

## 2022-01-11 PROCEDURE — 95251 CONT GLUC MNTR ANALYSIS I&R: CPT | Performed by: FAMILY MEDICINE

## 2022-01-11 PROCEDURE — G8427 DOCREV CUR MEDS BY ELIG CLIN: HCPCS | Performed by: FAMILY MEDICINE

## 2022-01-11 PROCEDURE — G8482 FLU IMMUNIZE ORDER/ADMIN: HCPCS | Performed by: FAMILY MEDICINE

## 2022-01-11 RX ORDER — INSULIN GLARGINE 100 [IU]/ML
50 INJECTION, SOLUTION SUBCUTANEOUS NIGHTLY
Qty: 2000 ML | Refills: 0 | Status: SHIPPED | OUTPATIENT
Start: 2022-01-11 | End: 2022-01-13 | Stop reason: SDUPTHER

## 2022-01-11 RX ORDER — IBUPROFEN 400 MG/1
400 TABLET ORAL DAILY PRN
Qty: 60 TABLET | Refills: 0 | Status: SHIPPED | OUTPATIENT
Start: 2022-01-11 | End: 2022-09-07 | Stop reason: SDUPTHER

## 2022-01-11 NOTE — PROGRESS NOTES
Patient ID: Alfonso Parrish 1995    Chief Complaint   Patient presents with    Diabetes         HPI     Diabetes: Her average sugar per the freestyle lou is 333.  87% of the time her sugar is very high which is above 256% of the time it is high at 181-250. She was at the target range 7% of the time which is between 70 and 180.  0% of the time she was low or very low. She does admit to not eating breakfast.  She admits to eating a snack at bedtime because she is hungry. She thinks this causes her sugar to go up in the morning. Per the freestyle [place], she had a low sugar of 69 1 afternoon. Around 3 PM    Jaw pain: Left jaw. Worse with chewing. She denies grinding her teeth at night. She does not eat a lot of gum or taffy or carrots. She tried Tylenol which did not help. Foot pain: her feet hurt her a lot. Is asking for disability papers. It's hard to stand continuously at work. If she could sit ever once in a while, it would really help  Review of Systems    Patient Active Problem List   Diagnosis    DKA (diabetic ketoacidoses)    Diabetic ketoacidosis ([address]    Type 1 diabetes mellitus with diabetic polyneuropathy ([address]    DM (diabetes mellitus) ([address]    Proteinuria    Chronic kidney disease (CKD) stage G1/A1, glomerular filtration rate (GFR) equal to or greater than 90 mL/min/1.73 square meter and albuminuria creatinine ratio less than 30 mg/g    Severe malnutrition (HCC)    Intellectual disability    Illiterate       No past surgical history on file.     Family History   Problem Relation Age of Onset    Diabetes Mother     High Blood Pressure Mother     Kidney Disease Mother     Vision Loss Mother     Diabetes Type 1  Father     Diabetes Type 1  Brother        Current Outpatient Medications on File Prior to Visit   Medication Sig Dispense Refill    Continuous Blood Gluc Sensor (FREESTYLE LOU 2 SENSOR) MISC Use 6 times per day 2 each 11    famotidine (PEPCID) 40 MG tablet Take 1 tablet by mouth 2 times daily 60 tablet 2    insulin lispro (HUMALOG) 100 UNIT/ML injection vial   0 units 201-249 7 units. 250-299 9 units. 300-349 11  units. 350-399 13 units. Greater than 400 call physician 2 each 2    INSULIN SYRINGE .5CC/29G ([employer] INS SYR .5CC/29G) 29G X 1/2\" 0.5 ML MISC 1 each by Does not apply route 5 times daily 200 each 11    glucose 4 g chewable tablet Take 4 tablets by mouth as needed for Low blood sugar 30 tablet 0    Continuous Blood Gluc  (FREESTYLE LOU 2 READER) KELVIN Check 6 times per day 1 each 11    gabapentin (NEURONTIN) 800 MG tablet Take 800 mg by mouth 3 times daily. Dr. Karen Pablo / Regina Younger 1 each by Does not apply route 3 times daily 300 each 2    blood glucose monitor strips Test 3 times/d& as needed for symptoms of irregular blood glucose. Dispense sufficient amount to accommodate needs. 100 strip 1     No current facility-administered medications on file prior to visit. Objective:         Physical Exam  HENT:      Head:     Psychiatric:         Cognition and Memory: Cognition is impaired. Vitals:    01/11/22 1544   BP: 98/64   Site: Left Upper Arm   Position: Sitting   Cuff Size: Medium Adult   Pulse: 97   Temp: 97.8 °F (36.6 °C)   TempSrc: Infrared   SpO2: 98%   Weight: 112 lb (50.8 kg)   Height: 5' (1.524 m)     Body mass index is 21.87 kg/m². Wt Readings from Last 3 Encounters:   01/11/22 112 lb (50.8 kg)   11/30/21 114 lb (51.7 kg)   10/12/21 106 lb 12.8 oz (48.4 kg)     BP Readings from Last 3 Encounters:   01/11/22 98/64   11/30/21 110/78   10/12/21 110/60          No results found for this visit on 01/11/22. Assessment:       Diagnosis Orders   1.  Type 1 diabetes mellitus with diabetic polyneuropathy (HCC)  ME CONTINUOUS GLUCOSE MONITORING ANALYSIS I&R   2. TMJ (temporomandibular joint syndrome)  ibuprofen (ADVIL;MOTRIN) 400 MG tablet           Plan:      See letter for details for work    Sugars are overall running very high. I talked the patient along with diabetic educator. We will increase the Lantus from 40 units to 45 units. I have written the prescription for 50 units because I anticipate she will need a higher dose in a week or 2. She will reassess with the diabetic educator soon    Also asking her to take 2 units at nighttime of the mealtime insulin. She can take this when she has her late snack. I have also urged her to have a small breakfast every morning. It is best that she not skip her insulin in the morning. Ibuprofen for the TMJ    Recheck in 6 weeks. Will try to get consult report from podiatry    30 minutes to see patient, write note, talk with diabetic educator, download CGM info  Return in about 6 weeks (around 2/22/2022) for DM, Bring sugars.

## 2022-01-11 NOTE — LETTER
[facility]  [id number]  Phone: [phone]  Fax: [phone]    Gilbert Estrada MD        January 11, 2022     Patient: Blaze Zimmerman   YOB: 1995   Date of Visit: 1/11/2022       To Whom It May Concern: It is my medical opinion that Wolf Finnegan may return to light duty immediately with the following restrictions: Duration of restrictions (days):  1 YEAR. Please allow to sit for up to 5 minutes every 30 minutes. If you have any questions or concerns, please don't hesitate to call.     Sincerely,        Gilbert Estrada MD

## 2022-01-11 NOTE — PROGRESS NOTES
[facility]  Diabetes Self-Management Education & Support Program    SUMMARY  Diabetes self-care management training was completed related to Being Active, Monitoring and Taking Medications. The participant will return on 1/17/2022 to continue DSMES related to Healthy Eating and Taking Medications. The participant did identify SMART Goal(s) as indicated, and will practice knowledge and skills related to Monitoring and Taking Medications to improve diabetes self-management. EVALUATION:  Sahil reader downloaded and report is reviewed with pt & PCP. Pt blood glucose remains largely uncontrolled. Provided education about Type 1 diagnosis & stressed importance of taking insulins exactly as prescribed and introducing a sliding scale insulin today to help prevent hypoglycemic occurrences, yet ensuring enough coverage for meals. Injection technique is reviewed with patient today. Pt is physically active at work but does not engage in regular exercise outside of work, part of this is because of the diabetic neuropathy. We discussed importance of eating at regular intervals and making good food choices. Pt is asked to take pictures of her food intake until our next visit. RECOMMENDATIONS:  Keep record of food intake. Follow new sliding scale guidelines for meal-time corrective insulin. Next provider visit is scheduled for February 2022       SMART GOAL(S)  1. Check blood glucose 6 times per day at least 7 of 7 days over the next week  ACHIEVEMENT OF GOAL(S)  [] 0-24%     [x] 25-49%     [] 50-74%     [] %  2. Be able to indicate if blood glucose results are in within target range of 70 to 180  ACHIEVEMENT OF GOAL(S)  [] 0-24%     [x] 25-49%     [] 50-74%     [] %  3.    Take medications as prescribed at the correct times at least 7 days over the next week  ACHIEVEMENT OF GOAL(S)  [] 0-24%     [x] 25-49%     [] 50-74%     [] %     DATE DSMES TOPIC EVALUATION 1/11/2022 HOW CAN BLOOD GLUCOSE MONITORING HELP ME?   a. Value of blood glucose monitoring   b. Realistic expectations   c. Blood glucose monitoring targets   d. Target adjustments   e. Setting A1C & blood glucose targets with provider   f. Meter selection    g. Technique for obtaining blood droplet   h. Blood glucose testing sites   i. Determining best times to test   j. Pregnancy recommendations   k. Data sharing with provider        The participant    Can demonstrate their glucometer procedure No   Logs their BG readings Yes    The participant needs to address : patient will be proficient with [employer] system including changing sensors and troubleshooting. DATE DSMES TOPIC EVALUATION     1/11/2022 HOW DO MY DIABETES MEDICATIONS WORK?   a. Type 1 medications & methods    Insulin injections    Injection sites   b. Type 2 medications    Oral agents    GLP-1 agonists   c. Hypoglycemia symptoms & treatment    Glucagon emergency kits   d. General guidance regarding insulin use whether Type 1, 2 or gestational diabetes    Storage of insulin    Disposal     Traveling with medications   e. Barriers to medication adherence      The participant    Can describe the expected action & side effects of prescribed diabetes medications No.   Can demonstrate injection technique (if applicable) Yes. The participant needs to address : take medications as directed consistently. DATE DSMES TOPIC EVALUATION     1/11/2022 HOW DOES PHYSICAL ACTIVITY AFFECT MY DIABETES?   a. Benefits of physical activity   b. Beginning a program of physical activity    Walking    Pedometers    Goal setting   c. Structured physical activity program    Aerobic activity    Resistance    Flexibility    Balance   d. Physical activity program progression   e. Safety issues   f. Barriers to physical activity   g.  Facilitators of physical activity        The participant has established a regular physical activity plan No    The participant needs to address : consider physical activity options for days when not at work. Time spent: 60 minutes    RAMSES GonzalezN RN  Diabetes Educator  [address] [place] [address]  [phone] office  [phone] cell  [phone] fax  [email or web address]

## 2022-01-11 NOTE — PATIENT INSTRUCTIONS
PLEASE BRING YOUR MEDICATIONS TO ALL APPOINTMENTS    The diagnoses and medications listed in this after visit summary may not be accurate at the time of check out. Please check MY CHART in 28-48 hours for possible corrections. Late cancellation policy: So that we can better accommodate people who are sick, please give our office 24 hour notice for an appointment cancellation. Missed appointments: Your care is very important to us. It is important that you keep your scheduled appointments. Multiple missed appointments will lead to a dismissal from the office. Patients arriving late will be worked into the schedule as time permits, with patients arriving on time taken as scheduled. Late arriving patients are more than welcome to wait or reschedule their appointments. Please allow 5-7 business days for paperwork to be processed. It is important that you check your MY Chart messages, as they include appointment reminders, test results, and other important information. If you have forgotten your password, please call [phone]. HERE ARE SOME LIFE CHANGING TIPS      1. Take your blood pressure medications at bedtime to reduce your chance of heart attack or stroke  2. Fever in kids:  Give both Tylenol and Ibuprofen at the same time rather than staggering them   3. Follow these tips to reduce childhood obesity: Reduce unnecessary exposure to antibiotics, consume whole milk instead of skim milk, watch public TV instead of regular TV (less exposure to junk food commercials), and reduce traumatic experiences. 4. 1 egg per day is good for your heart  5. Alternate day fasting does promote weight loss. Skipping breakfast increases your risk of obesity  6. Artificially sweetened drinks increase all cause mortality (strokes, body mass index, cardiovascular disease)  7. Kale consumption can reduce onset of dementia  8.  Walking at least 8000 steps per day and resistance exercise 2-3 x per week are good for your heart  9. Brushing teeth 3 times per day can decrease chance of getting diabetes  10. Antibiotic use is associated with a lifetime increased risk of breast cancer and heart disease.

## 2022-01-13 DIAGNOSIS — E10.42 TYPE 1 DIABETES MELLITUS WITH DIABETIC POLYNEUROPATHY (HCC): Primary | ICD-10-CM

## 2022-01-13 RX ORDER — INSULIN GLARGINE 100 [IU]/ML
50 INJECTION, SOLUTION SUBCUTANEOUS NIGHTLY
Qty: 2 EACH | Refills: 0 | Status: SHIPPED | OUTPATIENT
Start: 2022-01-13 | End: 2022-02-22 | Stop reason: SDUPTHER

## 2022-01-17 ENCOUNTER — NURSE ONLY (OUTPATIENT)
Dept: FAMILY MEDICINE CLINIC | Age: 27
End: 2022-01-17
Payer: COMMERCIAL

## 2022-01-17 DIAGNOSIS — E10.42 TYPE 1 DIABETES MELLITUS WITH DIABETIC POLYNEUROPATHY (HCC): Primary | ICD-10-CM

## 2022-01-17 PROCEDURE — G0108 DIAB MANAGE TRN  PER INDIV: HCPCS | Performed by: FAMILY MEDICINE

## 2022-01-17 NOTE — PROGRESS NOTES
[facility]  Diabetes Self-Management Education & Support Program    SUMMARY  Diabetes self-care management training was completed related to Healthy Eating and Monitoring. The participant will return on 1/24/2022 to continue DSMES related to Healthy Eating, Monitoring and Taking Medications. The participant did identify SMART Goal(s) as indicated, and will practice knowledge and skills related to Healthy Eating, Monitoring and Taking Medications to improve diabetes self-management. EVALUATION:  Pt was supposed to have kept a pictorial record of all foods consumed since last encounter, but she presented with only 2 pictures both of which are fast food. Pt also reports that she has not checked her glucose via fingerstick or Sahil since Sunday, she reports difficulty opening Sahil containers. Stressed importance of using fingersticks if she is unable to use CGM. Pt also reports that she has forgotten at least 2x to take her evening Lantus since the last encounter. Pt has not eaten since this morning or taken her afternoon insulin, fingerstick shows glucose of 366, pt states she would take 20 units of corrective insulin with that reading, she does not have her medication with her. RECOMMENDATIONS:  Continue to reinforce the need for eating at consistent times & to coordinate insulin injections. Also reinforce how to troubleshoot CGM. Next provider visit is scheduled for 2/22/2022       SMART GOAL(S)  1. Check blood glucose 4 times per day at least 7 of 7 days over the next week  ACHIEVEMENT OF GOAL(S)  [] 0-24%     [] 25-49%     [x] 50-74%     [] %  2. Take medications as prescribed at the correct times at least 7 days over the next week  ACHIEVEMENT OF GOAL(S)  [] 0-24%     [] 25-49%     [x] 50-74%     [] %  3.    Continue to log food intake and insulin administration 7 or 7 days over the next week  ACHIEVEMENT OF GOAL(S)  [] 0-24%     [x] 25-49%     [] 50-74% [] %     DATE DSMES TOPIC EVALUATION     1/17/2022 WHAT CAN I EAT?   a. General principles   b. Determining a healthy weight   c. Nutritional terms & tools    Healthy Plate method    Carbohydrate Counting    Reading food labels    Free apps   d. Pregnancy recommendations      The participant    Uses Healthy Plate principles in constructing meals No   Reads food labels in choosing acceptable foods No    The participant needs to address : be able to indicate if a food item is a high, med or low in carbs. DATE DSMES TOPIC EVALUATION     1/17/2022 HOW CAN BLOOD GLUCOSE MONITORING HELP ME?   a. Value of blood glucose monitoring   b. Realistic expectations   c. Blood glucose monitoring targets   d. Target adjustments   e. Setting A1C & blood glucose targets with provider   f. Meter selection    g. Technique for obtaining blood droplet   h. Blood glucose testing sites   i. Determining best times to test   j. Pregnancy recommendations   k. Data sharing with provider        The participant    Can demonstrate their glucometer procedure Yes   Logs their BG readings Yes    The participant needs to address : pt need to complete fingersticks if CGM is unavailable. Pt will have a plan for someone to help her open the CGM packaging and application of sensor if necessary. [facility]     1/17/2022 HOW DO MY DIABETES MEDICATIONS WORK?   a. Type 1 medications & methods    Insulin injections    Injection sites   b. Type 2 medications    Oral agents    GLP-1 agonists   c. Hypoglycemia symptoms & treatment    Glucagon emergency kits   d. General guidance regarding insulin use whether Type 1, 2 or gestational diabetes    Storage of insulin    Disposal     Traveling with medications   e.  Barriers to medication adherence      The participant    Can describe the expected action & side effects of prescribed diabetes medications No.   Can demonstrate injection technique (if applicable) Yes.    The participant needs to address : Pt will take insulin as directed. Time spent: 60 minutes    RAMSES ChingN RN  Diabetes Educator  [address] [place] [address]  [phone] office  [phone] cell  [phone] fax  [email or web address]

## 2022-01-27 ENCOUNTER — TELEPHONE (OUTPATIENT)
Dept: FAMILY MEDICINE CLINIC | Age: 27
End: 2022-01-27

## 2022-01-27 ENCOUNTER — FOLLOWUP TELEPHONE ENCOUNTER (OUTPATIENT)
Dept: FAMILY MEDICINE CLINIC | Age: 27
End: 2022-01-27

## 2022-01-27 NOTE — TELEPHONE ENCOUNTER
Received call this morning from pt that she was unable to fill her sensors. Upon review of her chart, it was noted that the last time the Libre2 sensors were prescribed was 12/17/21 for 2 sensors/30 days and 11 refills sent electronically to [facility]. Call to pharmacy, they are currently closed but LM on pharmacist line with the same prescription information as listed above. Patient notified. RAMSES BlackN RN  Diabetes Educator  [address] [place] [address]  [phone] office  [phone] cell  [phone] fax  [email or web address]

## 2022-01-27 NOTE — TELEPHONE ENCOUNTER
----- Message from [employer] sent at 1/27/2022  9:03 AM EST -----  Subject: Message to Provider    QUESTIONS  Information for Provider? PT called to f/u on sensor refill question   regarding pharmacy. Will be at work and is ok with a detailed vm being   left. Please f/u.  ---------------------------------------------------------------------------  --------------  CALL BACK INFO  What is the best way for the office to contact you? OK to leave message on   voicemail  Preferred Call Back Phone Number? [phone]  ---------------------------------------------------------------------------  --------------  SCRIPT ANSWERS  Relationship to Patient?  Self

## 2022-01-27 NOTE — TELEPHONE ENCOUNTER
I have already spoken with the patient and contacted the pharmacy & LM confirming that a script was sent electronically on 12/17/21 for her sensors and refills. RAMSES ConstantinoN RN  Diabetes Educator  [address] [place] [address]  [phone] office  [phone] cell  [phone] fax  [email or web address]

## 2022-01-31 ENCOUNTER — OFFICE VISIT (OUTPATIENT)
Dept: FAMILY MEDICINE CLINIC | Age: 27
End: 2022-01-31
Payer: COMMERCIAL

## 2022-01-31 DIAGNOSIS — E10.42 TYPE 1 DIABETES MELLITUS WITH DIABETIC POLYNEUROPATHY (HCC): ICD-10-CM

## 2022-01-31 PROCEDURE — G0108 DIAB MANAGE TRN  PER INDIV: HCPCS | Performed by: FAMILY MEDICINE

## 2022-01-31 PROCEDURE — 95251 CONT GLUC MNTR ANALYSIS I&R: CPT | Performed by: FAMILY MEDICINE

## 2022-01-31 RX ORDER — IBUPROFEN 200 MG
1 TABLET ORAL
Qty: 200 EACH | Refills: 11 | Status: SHIPPED | OUTPATIENT
Start: 2022-01-31 | End: 2022-04-06

## 2022-02-01 ENCOUNTER — TELEPHONE (OUTPATIENT)
Dept: FAMILY MEDICINE CLINIC | Age: 27
End: 2022-02-01

## 2022-02-01 DIAGNOSIS — E10.42 TYPE 1 DIABETES MELLITUS WITH DIABETIC POLYNEUROPATHY (HCC): ICD-10-CM

## 2022-02-01 NOTE — TELEPHONE ENCOUNTER
Pharmacy  Asking for clarification of directions of Insulin Lispro     insulin lispro (HUMALOG) 100 UNIT/ML injection vial [[id number]]     Order Details  Dose, Route, Frequency: As Directed   Dispense Quantity: 2 each Refills: 2          Si-150  0 units 201-249 7 units.  250-299 9 units.  300-349 11  units. 350-399 13 units.  Greater than 400 call physician     How many units between 151-200? Confirm injecting  how many times a day? Max total daily dose of units per day ?

## 2022-02-01 NOTE — PROGRESS NOTES
[facility]  Diabetes Self-Management Education & Support Program    SUMMARY  Diabetes self-care management training was completed related to Healthy Eating, Monitoring, Taking Medications and Problem Solving. The participant will return on 2/14/2022 to continue DSMES related to Healthy Eating, Monitoring, Taking Medications and Problem Solving. The participant did identify SMART Goal(s) as indicated, and will practice knowledge and skills related to Taking Medications and Problem Solving to improve diabetes self-management. EVALUATION:  Annmarie Clements downloaded and report (see attached) reviewed with patient. Patient also completed log of glucose readings & how much insulin she administered in response (see attached). Patient remains with wide glycemic index ranging from . She continues to take humalog inconsistently, with inconsistent amounts and sometimes skipping doses when she skips meals. Patient did take some recordings of her foods since our last encounter, unfortunately most of it was fast food. Patient is again reminded that she is Type 1 and this means that she requires regular insulin administrations to avoid wide fluctuations in glucose levels. Patient reports that she forgot or lost the paper with her sliding scale on it, so this was written down and provided to her again, she is strongly encouraged to follow this scale consistently before we adjust her medications or dosages again. Assisted patient to replace her sensor. Patient applied Sahil 2 sensor inserted on back of right Arm. Sensor initiated via [employer] 2 . Reviewed interstitial glucose versus capillary glucose. Instructed on  setting and set up the appropriate alarms. Time and date set on   300 for Low Alarm   70 for High Alarm. Explained the arrows and the meaning of their blood sugar. Patient instructed the system is water resistant and can shower.  To reinforce sensor with tape as needed if it begins to peel away from the skin. Encouraged patient to call [facility] or the Diabetes Center with any questions. Patient voiced understanding of [employer] 2 instructions via teach back. She continues to struggle with opening the packaging but does well with prepping the area and applying the sensor. RECOMMENDATIONS:  Patient should continue to work on taking medications including insulin exactly as instructed. We will continue to review results from the [employer] 2 CGM and try to coordinate that information with her food intake, medication administration. Next provider visit is scheduled for 2/22/2022       SMART GOAL(S)  1. Check blood glucose 6 times per day at least 7 of 7 days over the next week  ACHIEVEMENT OF GOAL(S)  [] 0-24%     [x] 25-49%     [] 50-74%     [] %  2. Take medications as prescribed at the correct times at least 7 days over the next week  ACHIEVEMENT OF GOAL(S)  [] 0-24%     [x] 25-49%     [] 50-74%     [] %  3. Keep a food/activity journal at least 7 days over the next week  ACHIEVEMENT OF GOAL(S)  [] 0-24%     [x] 25-49%     [] 50-74%     [] %       DATE [employer] TOPIC EVALUATION     2/1/2022 WHAT CAN I EAT?   a. General principles   b. Determining a healthy weight   c. Nutritional terms & tools    Healthy Plate method    Carbohydrate Counting    Reading food labels    Free apps   d. Pregnancy recommendations      The participant    Uses Healthy Plate principles in constructing meals No   Reads food labels in choosing acceptable foods No    The participant needs to address : Patient should continue to take pictures of food to review with diabetes educator. Patient will be able to recognize if her meal is low (0-20g), medium (20-45g), or high (45-60g) of carbs. DATE [employer] TOPIC EVALUATION     2/1/2022 HOW CAN BLOOD GLUCOSE MONITORING HELP ME?   a. Value of blood glucose monitoring   b.  Realistic expectations   c. Blood glucose monitoring targets   d. Target adjustments   e. Setting A1C & blood glucose targets with provider   f. Meter selection    g. Technique for obtaining blood droplet   h. Blood glucose testing sites   i. Determining best times to test   j. Pregnancy recommendations   k. Data sharing with provider        The participant    Can demonstrate their glucometer procedure Pt is able to successfully apply sensor once the package is opened for her.  Logs their BG readings n/a    The participant needs to address : Patient will be able to have a primary resource such as family or friend to assist her in opening the sensor packaging if she is unable to do it herself. DATE DSMES TOPIC EVALUATION     2/1/2022 HOW DO MY DIABETES MEDICATIONS WORK?   a. Type 1 medications & methods    Insulin injections    Injection sites   b. Type 2 medications    Oral agents    GLP-1 agonists   c. Hypoglycemia symptoms & treatment    Glucagon emergency kits   d. General guidance regarding insulin use whether Type 1, 2 or gestational diabetes    Storage of insulin    Disposal     Traveling with medications   e. Barriers to medication adherence      The participant    Can describe the expected action & side effects of prescribed diabetes medications No.   Can demonstrate injection technique (if applicable) Yes. The participant needs to address : Pt needs to work on taking medications consistently for the best benefit. Time spent: 35 minutes teaching, 15 minutes reviewing CGM download. RAMSES BrockN RN  Diabetes Educator  [address] [place] [address]  [phone] office  [phone] cell  [phone] fax  [email or web address]

## 2022-02-02 ENCOUNTER — TELEPHONE (OUTPATIENT)
Dept: FAMILY MEDICINE CLINIC | Age: 27
End: 2022-02-02

## 2022-02-02 NOTE — TELEPHONE ENCOUNTER
Patient called asking if she can have a different sensor. The one she has been using is not reading her blood sugar levels and she is not sure how much insulin she is to give herself. She even picked up one of the refills and that is not working also. The pharmacist asked her to call the , but she would like a different sensor.

## 2022-02-02 NOTE — TELEPHONE ENCOUNTER
I spoke with her yesterday & informed her that she should contact [employer] to see if they could help her troubleshoot. I'm leaving today at 4pm if she can come to the [facility] clinic, I will replace it for her. She can not open the packaging herself so maybe we need to see about changing her over to [facility] instead? I can't tell what she is doing to these [employer] sensors that makes them not work.

## 2022-02-02 NOTE — TELEPHONE ENCOUNTER
Notify patient Shantel's response. Have her call Shantel for an appointment if she continues to have trouble.

## 2022-02-22 ENCOUNTER — NURSE ONLY (OUTPATIENT)
Dept: FAMILY MEDICINE CLINIC | Age: 27
End: 2022-02-22
Payer: COMMERCIAL

## 2022-02-22 ENCOUNTER — OFFICE VISIT (OUTPATIENT)
Dept: FAMILY MEDICINE CLINIC | Age: 27
End: 2022-02-22
Payer: COMMERCIAL

## 2022-02-22 VITALS
HEIGHT: 60 IN | BODY MASS INDEX: 23.44 KG/M2 | DIASTOLIC BLOOD PRESSURE: 72 MMHG | SYSTOLIC BLOOD PRESSURE: 110 MMHG | OXYGEN SATURATION: 99 % | WEIGHT: 119.4 LBS | HEART RATE: 110 BPM

## 2022-02-22 DIAGNOSIS — E10.42 TYPE 1 DIABETES MELLITUS WITH DIABETIC POLYNEUROPATHY (HCC): Primary | ICD-10-CM

## 2022-02-22 DIAGNOSIS — R11.0 NAUSEA: ICD-10-CM

## 2022-02-22 DIAGNOSIS — Z55.0 ILLITERATE: ICD-10-CM

## 2022-02-22 DIAGNOSIS — F79 INTELLECTUAL DISABILITY: ICD-10-CM

## 2022-02-22 PROCEDURE — G8427 DOCREV CUR MEDS BY ELIG CLIN: HCPCS | Performed by: FAMILY MEDICINE

## 2022-02-22 PROCEDURE — 99214 OFFICE O/P EST MOD 30 MIN: CPT | Performed by: FAMILY MEDICINE

## 2022-02-22 PROCEDURE — 2022F DILAT RTA XM EVC RTNOPTHY: CPT | Performed by: FAMILY MEDICINE

## 2022-02-22 PROCEDURE — 3046F HEMOGLOBIN A1C LEVEL >9.0%: CPT | Performed by: FAMILY MEDICINE

## 2022-02-22 PROCEDURE — G8420 CALC BMI NORM PARAMETERS: HCPCS | Performed by: FAMILY MEDICINE

## 2022-02-22 PROCEDURE — G8482 FLU IMMUNIZE ORDER/ADMIN: HCPCS | Performed by: FAMILY MEDICINE

## 2022-02-22 PROCEDURE — 1036F TOBACCO NON-USER: CPT | Performed by: FAMILY MEDICINE

## 2022-02-22 PROCEDURE — 95251 CONT GLUC MNTR ANALYSIS I&R: CPT | Performed by: FAMILY MEDICINE

## 2022-02-22 RX ORDER — INSULIN GLARGINE 100 [IU]/ML
45 INJECTION, SOLUTION SUBCUTANEOUS NIGHTLY
Qty: 1 EACH | Refills: 11 | Status: SHIPPED | OUTPATIENT
Start: 2022-02-22 | End: 2022-04-06 | Stop reason: ALTCHOICE

## 2022-02-22 RX ORDER — FAMOTIDINE 40 MG/1
40 TABLET, FILM COATED ORAL 2 TIMES DAILY
Qty: 60 TABLET | Refills: 2 | Status: SHIPPED | OUTPATIENT
Start: 2022-02-22 | End: 2022-04-06 | Stop reason: ALTCHOICE

## 2022-02-22 SDOH — EDUCATIONAL SECURITY - EDUCATION ATTAINMENT: ILITERACY AND LOW LEVEL LITERACY: Z55.0

## 2022-02-22 NOTE — PATIENT INSTRUCTIONS
PLEASE BRING YOUR MEDICATIONS TO ALL APPOINTMENTS      Please check MY CHART for test results. If you have forgotten your password, please call [phone]. The diagnoses and medications listed in this after visit summary may not be up to date. Please check MY CHART in 28-48 hours for possible corrections. Late cancellation policy: So that we can better accommodate people who are sick, please give our office 24 hour notice for an appointment cancellation. Missed appointments: Your care is very important to us. It is important that you keep your scheduled appointments. Multiple missed appointments will lead to a dismissal from the office. Patients arriving late will be worked into the schedule as time permits, with patients arriving on time taken as scheduled. Late arriving patients are more than welcome to wait or reschedule their appointments. Please allow 5-7 business days for paperwork to be processed. HERE ARE SOME LIFE CHANGING TIPS      1. Take your blood pressure medications at bedtime to reduce your chance of heart attack or stroke  2. Fever in kids:  Give both Tylenol and Ibuprofen at the same time rather than staggering them   3. Follow these tips to reduce childhood obesity: Reduce unnecessary exposure to antibiotics, consume whole milk instead of skim milk, watch public TV instead of regular TV (less exposure to junk food commercials), and reduce traumatic experiences. 4. 1 egg per day is good for your heart  5. Alternate day fasting does promote weight loss. Skipping breakfast increases your risk of obesity  6. Artificially sweetened drinks increase all cause mortality (strokes, body mass index, cardiovascular disease)  7. Kale consumption can reduce onset of dementia  8. Walking at least 8000 steps per day and resistance exercise 2-3 x per week are good for your heart  9. Brushing teeth 3 times per day can decrease chance of getting diabetes  10.  Antibiotic use is associated with a lifetime increased risk of breast cancer and heart disease.

## 2022-02-23 PROBLEM — E43 SEVERE MALNUTRITION (HCC): Chronic | Status: RESOLVED | Noted: 2020-06-24 | Resolved: 2022-02-23

## 2022-02-23 PROBLEM — R80.9 PROTEINURIA: Status: RESOLVED | Noted: 2018-01-22 | Resolved: 2022-02-23

## 2022-02-23 NOTE — PROGRESS NOTES
Patient ID: Estela Perales 1995    . Chief Complaint   Patient presents with    Diabetes         Diabetes    Type 1 diabetes: Here for follow-up of her diabetes. Diabetic educator is here as well. Per the printout from her freestyle tia, most the time patient's blood sugar is above target. She admits to skipping her Lantus if she has a low blood sugar reading. She does not always take her mealtime insulins. She continues to have neuropathy in her feet. She takes the gabapentin. For this. It makes her sleepy. Review of Systems    Patient Active Problem List   Diagnosis    Type 1 diabetes mellitus with diabetic polyneuropathy ([facility] [address]    DM (diabetes mellitus) ([facility] [address] [facility]    Chronic kidney disease (CKD) stage G1/A1, glomerular filtration rate (GFR) equal to or greater than 90 mL/min/1.73 square meter and albuminuria creatinine ratio less than 30 mg/g    Intellectual disability    Illiterate       No past surgical history on file. Family History   Problem Relation Age of Onset    Diabetes Mother     High Blood Pressure Mother     Kidney Disease Mother     Vision Loss Mother     Diabetes Type 1  Father     Diabetes Type 1  Brother        Current Outpatient Medications on File Prior to Visit   Medication Sig Dispense Refill    INSULIN SYRINGE .5CC/29G (Gerre Flirt INS SYR .5CC/29G) 29G X 1/2\" 0.5 ML MISC 1 each by Does not apply route 5 times daily 200 each 11    ibuprofen (ADVIL;MOTRIN) 400 MG tablet Take 1 tablet by mouth daily as needed for Pain 60 tablet 0    Continuous Blood Gluc Sensor (FREESTYLE TIA 2 SENSOR) MISC Use 6 times per day 2 each 11    glucose 4 g chewable tablet Take 4 tablets by mouth as needed for Low blood sugar 30 tablet 0    Continuous Blood Gluc  (FREESTYLE TIA 2 READER) KELVIN Check 6 times per day 1 each 11    gabapentin (NEURONTIN) 800 MG tablet Take 800 mg by mouth 3 times daily.  Dr. Ronaldo Gallegos / Son Room 1 each by Does not apply route 3 times daily 300 each 2    blood glucose monitor strips Test 3 times/d& as needed for symptoms of irregular blood glucose. Dispense sufficient amount to accommodate needs. 100 strip 1     No current facility-administered medications on file prior to visit. Objective:         Physical Exam  Vitals and nursing note reviewed. Constitutional:       Appearance: She is well-developed. HENT:      Head: Normocephalic and atraumatic. Cardiovascular:      Rate and Rhythm: Normal rate and regular rhythm. Heart sounds: Normal heart sounds, S1 normal and S2 normal.   Pulmonary:      Effort: Pulmonary effort is normal. No respiratory distress. Breath sounds: Normal breath sounds. No wheezing. Musculoskeletal:      Cervical back: Neck supple. Skin:     General: Skin is warm and dry. Neurological:      Mental Status: She is alert. Vitals:    02/22/22 1541   BP: 110/72   Site: Left Upper Arm   Position: Sitting   Cuff Size: Medium Adult   Pulse: 110   SpO2: 99%   Weight: 119 lb 6.4 oz (54.2 kg)   Height: 5' (1.524 m)     Body mass index is 23.32 kg/m². Wt Readings from Last 3 Encounters:   02/22/22 119 lb 6.4 oz (54.2 kg)   01/11/22 112 lb (50.8 kg)   11/30/21 114 lb (51.7 kg)     BP Readings from Last 3 Encounters:   02/22/22 110/72   01/11/22 98/64   11/30/21 110/78          No results found for this visit on 02/22/22. The ASCVD Risk score (Elsa Casarez, et al., 2013) failed to calculate for the following reasons: The 2013 ASCVD risk score is only valid for ages 36 to 78  Lab Review   No visits with results within 2 Month(s) from this visit. Latest known visit with results is:   Office Visit on 11/30/2021   Component Date Value    Microalbumin, Random Uri* 11/30/2021 1.40     Creatinine, Ur 11/30/2021 121.2     Microalbumin Creatinine * 11/30/2021 11.6            Assessment:       Diagnosis Orders   1.  Type 1 diabetes mellitus with diabetic polyneuropathy (HCC)  insulin lispro (HUMALOG) 100 UNIT/ML injection vial    insulin glargine (LANTUS) 100 UNIT/ML injection vial   2. Nausea  famotidine (PEPCID) 40 MG tablet   3. Intellectual disability     4. Illiterate             Plan: We will get her hemoglobin A1c next time. I have increased her sliding scale. See medication list for new sliding scale parameters. I went over 20-30 different scenarios with different blood sugar readings. Patient was eventually able to understand how much insulin to give herself based on a blood sugar reading. Emphasized to her that it is very important to take her Lantus every day no matter what her blood sugar reading is. I am hoping she understands. Time 30 minutes      Return in about 3 months (around 5/22/2022) for DM PA1c.

## 2022-02-24 NOTE — PROGRESS NOTES
[facility]) Diabetes  Management & Support Program  [address] 4th floor  [facility], [address]  [phone] phone  [phone] fax    Patient ID: Jamar Nicholson 1995  Referring Provider: Dr. Ann Segura     Patient's name and  were verified. Subjective:    She presents for Her follow-up diabetic visit. She has type 1 diabetes mellitus. Home regimen includes: Insulin She is noncompliant some of the time. Assessment:     Lab Results   Component Value Date    LABA1C 14.0 10/12/2021    BUN 7 2021    CREATININE 0.57 2021     There were no vitals filed for this visit. Wt Readings from Last 3 Encounters:   22 119 lb 6.4 oz (54.2 kg)   22 112 lb (50.8 kg)   21 114 lb (51.7 kg)     Ht Readings from Last 3 Encounters:   22 5' (1.524 m)   22 5' (1.524 m)   21 5' (1.524 m)     Current monitoring regimen: CGM [email or web address] [address] blood sugar trends: continue to be higher than desired levels  Any episodes of hypoglycemia? no  Depression screening completed no  Previous visit with dietician: no  Current diet: not asked  Current exercise: no regular exercise  Eye exam current (within one year): unknown  Any history of foot problems? yes  Last foot exam: unknown-pt follows with Dr. Cata Soto up to date: yes   Taking ASA:  No   Appropriate for use of [employer] Glucose Grid:  No    Focus:     Fernando Cummins downloaded. Report reviewed with PCP and patient. Pt reports she has been following the insulin dosing instructions as given at last encounter. Her lowest reading recently was 110 at bedtime, pt did not take Lantus and her morning fasting was 299. It was again stressed to patient that she is Type 1 and insulin therapy is always going to be necessary.   It is recommended that her sliding scale be increased to compensate for her higher readings and carb intake since patient is not capable of adjusting her dose based on carb ratio due to her learning disability. DSME PLAN:   Counseling at today's visit: reminded to check sugars regularly and to bring readings in at the time of the next visit. Increased dose of insulin: Rapid acting sliding scale: =0, 151-200=9 units, 201-250=11 units, 251-300=13 units, 301-350=15 units, 351-400=18 units, over 400=call PCP. Meter download, medications, PMH and nursing assessment reviewed with Dr. Keshawn Iglesias. Estela Patelny Lady states She is willing to participate in this plan of care and verbalized understanding of all instructions provided. Teach back used to verify comprehension. Total time involved in direct patient education: 30 minutes. RAMSES HawkinsN RN  Diabetes Educator  [address] [place] [address]  [phone] office  [phone] cell  [phone] fax  [email or web address]

## 2022-03-16 ENCOUNTER — NURSE ONLY (OUTPATIENT)
Dept: FAMILY MEDICINE CLINIC | Age: 27
End: 2022-03-16
Payer: COMMERCIAL

## 2022-03-16 DIAGNOSIS — E10.42 TYPE 1 DIABETES MELLITUS WITH DIABETIC POLYNEUROPATHY (HCC): Primary | ICD-10-CM

## 2022-03-16 PROCEDURE — 95251 CONT GLUC MNTR ANALYSIS I&R: CPT | Performed by: FAMILY MEDICINE

## 2022-03-16 NOTE — Clinical Note
Patient continues to have episodes of hypoglycemia according to most recent download (see attached media). Patient also reports feeling symptomatic when levels are in the low-mid 100's. I reassured her that those are not low numbers. I have encouraged her to scan more frequently including before meals, post-prandial and bed time. She will see me again in about 4 weeks to download a new report. JESSICA Donaldson RN  Diabetes Educator  [address] [place] [address]  [phone] office  [phone] cell  [phone] fax  [email or web address]

## 2022-03-16 NOTE — PROGRESS NOTES
[facility]) Diabetes  Management & Support Program  [address]  [address]  [phone] phone  [phone] fax    Patient ID: Anisha Rowe 1995  Referring Provider: Dr. Joi Sultana     Patient's name and  were verified. Subjective:    She presents for Her follow-up diabetic visit. She has type 1 diabetes mellitus. Home regimen includes: Insulin She is compliant most of the time. Assessment:     Lab Results   Component Value Date    LABA1C 14.0 10/12/2021    BUN 7 2021    CREATININE 0.57 2021     There were no vitals filed for this visit. Wt Readings from Last 3 Encounters:   22 119 lb 6.4 oz (54.2 kg)   22 112 lb (50.8 kg)   21 114 lb (51.7 kg)     Ht Readings from Last 3 Encounters:   22 5' (1.524 m)   22 5' (1.524 m)   21 5' (1.524 m)     Current monitoring regimen: CGM with Sahil 2  Home blood sugar trends:   Any episodes of hypoglycemia? yes - several times over the last 2 weeks   Depression screening completed no  Previous visit with dietician: no  Current diet: in general, an \"unhealthy\" diet  Current exercise: no regular exercise  Eye exam current (within one year): yes  Any history of foot problems? yes  Last foot exam: Pt is seen by podiatry  Immunizations up to date: yes  Taking ASA:  No - not indicated  Appropriate for use of MyChart Glucose Grid:  No    Focus:     CGM download completed and report reviewed with patient. Noted that patient had several occurrences of hypoglycemia at various times with no regular pattern. Patient reports that she feels symptoms similar to hypoglycemia even when her blood sugars are in the 100 range. She has skipped her insulin when her readings before meals were in the low-mid 100's which then subsequently causes her glucose level to exceed 300.   Patient is again educated that she needs to check her glucose before meals and understand that when she eats, she will need insulin coverage. I have encouraged the patient to check glucose levels both before her meal and post-prandial in addition to before bedtime. DSME PLAN:   Discussed general issues about diabetes pathophysiology and management. Counseling at today's visit: discussed the advantages of a diet low in carbohydrates, reminded to check sugars regularly and to bring readings in at the time of the next visit and discussed management of hypoglycemic episodes. Discussed ways to avoid symptomatic hypoglycemia. Meter download, medications, PMH and nursing assessment reviewed with PCP via routed note. Estela Rosy Sanderson states She is willing to participate in this plan of care and verbalized understanding of all instructions provided. Teach back used to verify comprehension. Total time involved in direct patient education: 30 minutes. RAMSES BradleyN RN  Diabetes Educator  [address] [place] [address]  [phone] office  [phone] cell  [phone] fax  [email or web address]

## 2022-04-05 ENCOUNTER — TELEPHONE (OUTPATIENT)
Dept: FAMILY MEDICINE CLINIC | Age: 27
End: 2022-04-05

## 2022-04-05 NOTE — TELEPHONE ENCOUNTER
See last telephone encounter. Since her sugars are still running high, I'd like to do a telephone encounter visit to go over her sugars.

## 2022-04-06 ENCOUNTER — TELEMEDICINE (OUTPATIENT)
Dept: FAMILY MEDICINE CLINIC | Age: 27
End: 2022-04-06
Payer: COMMERCIAL

## 2022-04-06 DIAGNOSIS — K21.00 GASTROESOPHAGEAL REFLUX DISEASE WITH ESOPHAGITIS WITHOUT HEMORRHAGE: ICD-10-CM

## 2022-04-06 DIAGNOSIS — F79 INTELLECTUAL DISABILITY: ICD-10-CM

## 2022-04-06 DIAGNOSIS — E10.22 TYPE 1 DIABETES MELLITUS WITH STAGE 1 CHRONIC KIDNEY DISEASE (HCC): Primary | ICD-10-CM

## 2022-04-06 DIAGNOSIS — N18.1 TYPE 1 DIABETES MELLITUS WITH STAGE 1 CHRONIC KIDNEY DISEASE (HCC): Primary | ICD-10-CM

## 2022-04-06 PROCEDURE — 99442 PR PHYS/QHP TELEPHONE EVALUATION 11-20 MIN: CPT | Performed by: FAMILY MEDICINE

## 2022-04-06 RX ORDER — PEN NEEDLE, DIABETIC 29 GAUGE
1 NEEDLE, DISPOSABLE MISCELLANEOUS 2 TIMES DAILY
Qty: 100 EACH | Refills: 11 | Status: SHIPPED | OUTPATIENT
Start: 2022-04-06 | End: 2022-05-23 | Stop reason: SDUPTHER

## 2022-04-06 RX ORDER — OMEPRAZOLE 40 MG/1
40 CAPSULE, DELAYED RELEASE ORAL
Qty: 30 CAPSULE | Refills: 1 | Status: SHIPPED | OUTPATIENT
Start: 2022-04-06

## 2022-04-06 ASSESSMENT — PATIENT HEALTH QUESTIONNAIRE - PHQ9
SUM OF ALL RESPONSES TO PHQ9 QUESTIONS 1 & 2: 0
SUM OF ALL RESPONSES TO PHQ QUESTIONS 1-9: 0
1. LITTLE INTEREST OR PLEASURE IN DOING THINGS: 0
2. FEELING DOWN, DEPRESSED OR HOPELESS: 0

## 2022-04-06 NOTE — PROGRESS NOTES
Moira Ramírez is a 27 y.o. female evaluated via telephone on 4/6/2022 for Blood Sugar Problem (high sugar readings ) and Medication Problem (the pepcid  medication is not working for her can you send something else )  . Documentation:  I communicated with the patient and/or health care decision maker about     Type 1 diabetes: Patient admits that she will take less of her insulin because she has a sugar that is in the 100s. Yesterday she only took 40 units of her Lantus. She is worried about having a low sugar reaction. She gets confused on the sliding scale insulin. Abdominal pain: The Pepcid is not working. She is asking for something stronger    . Details of this discussion including any medical advice provided:       Diagnosis Orders   1. Type 1 diabetes mellitus with stage 1 chronic kidney disease (HCC)  Insulin Pen Needle ([employer] PEN NEEDLES 29G) 29G X 12MM MISC   2. Gastroesophageal reflux disease with esophagitis without hemorrhage  omeprazole (PRILOSEC) 40 MG delayed release capsule   3. Intellectual disability       It is pretty clear to me that because of patient's intellectual disability, she cannot understand how to use insulin. We have tried for several months and she continues to be resistant to taking her medication as prescribed. She is also very confused with her sliding scale insulin. Therefore I am going to switch her to 70/30 insulin twice a day with breakfast and dinner. She will no longer have to do the sliding scale adjustments. She is actually happy with this. She will come by our office tomorrow for somebody to show her how to use her insulin pen. Discontinue the Pepcid and start Prilosec. I have asked her to check her sugars 5 times a day so that we can have more data to utilize. Total Time: minutes: 11-20 minutes    Estela Perales was evaluated through a synchronous (real-time) audio encounter.  Patient identification was verified at the start of the visit. She (or guardian if applicable) is aware that this is a billable service, which includes applicable co-pays. This visit was conducted with the patient's (and/or legal guardian's) verbal consent. She has not had a related appointment within my department in the past 7 days or scheduled within the next 24 hours. The patient was located in a state where the provider was licensed to provide care.     Note: not billable if this call serves to triage the patient into an appointment for the relevant concern    Sekou Lai MD

## 2022-04-06 NOTE — Clinical Note
Pharmacy to cancel all insulins except the 70/30 pen  Patient to come by around 3:30 tomorrow to get help with using insulin pen

## 2022-04-08 ENCOUNTER — TELEPHONE (OUTPATIENT)
Dept: FAMILY MEDICINE CLINIC | Age: 27
End: 2022-04-08

## 2022-04-08 ENCOUNTER — NURSE ONLY (OUTPATIENT)
Dept: FAMILY MEDICINE CLINIC | Age: 27
End: 2022-04-08
Payer: COMMERCIAL

## 2022-04-08 DIAGNOSIS — E10.22 TYPE 1 DIABETES MELLITUS WITH STAGE 1 CHRONIC KIDNEY DISEASE (HCC): Primary | ICD-10-CM

## 2022-04-08 DIAGNOSIS — N18.1 TYPE 1 DIABETES MELLITUS WITH STAGE 1 CHRONIC KIDNEY DISEASE (HCC): Primary | ICD-10-CM

## 2022-04-08 PROCEDURE — 99211 OFF/OP EST MAY X REQ PHY/QHP: CPT | Performed by: FAMILY MEDICINE

## 2022-04-08 NOTE — PROGRESS NOTES
Patient was shown how to use the novolog 70/30   Quick pen. Patient was taking off lantus at bed time. Patient is to take Novolog at breakfast and dinner. Patient voiced understanding. Patient dialed up 25 units on the demonstration pen. Insulin was not given due to patient did not eat breakfast and had to get to work. Patient will take 25 units at work when she eats. All patient questions answer. Will call patient on Monday to see how she is doing on the novolog.

## 2022-04-12 DIAGNOSIS — E10.42 TYPE 1 DIABETES MELLITUS WITH DIABETIC POLYNEUROPATHY (HCC): ICD-10-CM

## 2022-04-12 DIAGNOSIS — N18.1 TYPE 1 DIABETES MELLITUS WITH STAGE 1 CHRONIC KIDNEY DISEASE (HCC): Primary | ICD-10-CM

## 2022-04-12 DIAGNOSIS — E10.22 TYPE 1 DIABETES MELLITUS WITH STAGE 1 CHRONIC KIDNEY DISEASE (HCC): Primary | ICD-10-CM

## 2022-04-12 NOTE — TELEPHONE ENCOUNTER
Patient stated she is given the novolog 25 units before breakfast and before dinner. Patient asked if she could come in  for us to show her how many injections does she has per pen. Patient came into the office today. The novolog pen has 100 units and each pen lasts 2 days. Patient will us 15 pens per month. Patient does not have enough to last a month. Dr Fiona Jaimes sent in a new script for patient. Patient voiced understanding and told if she has anymore questions please call.

## 2022-04-13 ENCOUNTER — NURSE ONLY (OUTPATIENT)
Dept: FAMILY MEDICINE CLINIC | Age: 27
End: 2022-04-13
Payer: COMMERCIAL

## 2022-04-13 DIAGNOSIS — E10.22 TYPE 1 DIABETES MELLITUS WITH STAGE 1 CHRONIC KIDNEY DISEASE (HCC): Primary | ICD-10-CM

## 2022-04-13 DIAGNOSIS — N18.1 TYPE 1 DIABETES MELLITUS WITH STAGE 1 CHRONIC KIDNEY DISEASE (HCC): Primary | ICD-10-CM

## 2022-04-13 PROCEDURE — 98960 EDU&TRN PT SELF-MGMT NQHP 1: CPT

## 2022-04-13 PROCEDURE — 95251 CONT GLUC MNTR ANALYSIS I&R: CPT

## 2022-04-13 NOTE — PROGRESS NOTES
Dinner last night: Little [employer] 2 slices, diet michelle mist  Snack at bedtime: none  25 units of 70/30  @ 6pm - bg 400    @8am - bg 400  Breakfast: 1 banana, chocolate chip granola bar, diet snapple tea  25 units of 70/30  @10am - 138  @11am - 60  2 pinwheels, celery, carrots, 5 ritz crackers, partial cupcake  @ 1pm- 62, lemonade & cookie  @ 2pm-77, 89, 124, 217  @3:    Patient received extensive counseling on how to prevent and appropriately treat hypoglycemia. Patient was re-educated that treatment decisions may require a fingerstick in addition to CGM monitoring based on differences between interstitial glucose and capillary glucose readings. [address] report downloaded and reviewed with patient to identify trends/patterns. Time spent: 30 minutes  RAMSES AlfaroN RN  Diabetes Educator  [address] [place] [address]  [phone] office  [phone] cell  [phone] fax  [email or web address]

## 2022-04-20 ENCOUNTER — TELEPHONE (OUTPATIENT)
Dept: FAMILY MEDICINE CLINIC | Age: 27
End: 2022-04-20

## 2022-04-28 ENCOUNTER — TELEPHONE (OUTPATIENT)
Dept: FAMILY MEDICINE CLINIC | Age: 27
End: 2022-04-28

## 2022-04-28 DIAGNOSIS — E10.42 TYPE 1 DIABETES MELLITUS WITH DIABETIC POLYNEUROPATHY (HCC): Primary | ICD-10-CM

## 2022-04-28 NOTE — TELEPHONE ENCOUNTER
Patient called back. We went over her blood sugar readings which fluctuate. She says a lot of times when she wakes up in the morning her sugar is over 200. It drops a little bit after the breakfast time. She did have a low sugar reaction this past Monday. It happened at night. She recalls that she was late  having dinner. She may have had a smaller meal for dinner. Her sugar then went down to 45 and then 47 that night. She tried to bring up her sugar with orange juice and had a bran cake. If patient cannot eat a regular dinner at her regular time, I am asking her to cut back on the evening insulin down to 15 units for that night. She understands. I am also going to send her glucose tablets to help her if she has low sugar reactions.

## 2022-05-12 ENCOUNTER — OFFICE VISIT (OUTPATIENT)
Dept: FAMILY MEDICINE CLINIC | Age: 27
End: 2022-05-12
Payer: COMMERCIAL

## 2022-05-12 VITALS
SYSTOLIC BLOOD PRESSURE: 120 MMHG | DIASTOLIC BLOOD PRESSURE: 70 MMHG | BODY MASS INDEX: 22.5 KG/M2 | HEART RATE: 97 BPM | WEIGHT: 114.6 LBS | HEIGHT: 60 IN

## 2022-05-12 DIAGNOSIS — E10.22 TYPE 1 DIABETES MELLITUS WITH STAGE 1 CHRONIC KIDNEY DISEASE (HCC): Primary | ICD-10-CM

## 2022-05-12 DIAGNOSIS — F79 INTELLECTUAL DISABILITY: ICD-10-CM

## 2022-05-12 DIAGNOSIS — N18.1 TYPE 1 DIABETES MELLITUS WITH STAGE 1 CHRONIC KIDNEY DISEASE (HCC): Primary | ICD-10-CM

## 2022-05-12 DIAGNOSIS — E10.42 TYPE 1 DIABETES MELLITUS WITH DIABETIC POLYNEUROPATHY (HCC): ICD-10-CM

## 2022-05-12 DIAGNOSIS — L84 CALLUS OF FOOT: ICD-10-CM

## 2022-05-12 LAB
CHP ED QC CHECK: ABNORMAL
GLUCOSE BLD-MCNC: 318 MG/DL

## 2022-05-12 PROCEDURE — 2022F DILAT RTA XM EVC RTNOPTHY: CPT | Performed by: FAMILY MEDICINE

## 2022-05-12 PROCEDURE — 82962 GLUCOSE BLOOD TEST: CPT | Performed by: FAMILY MEDICINE

## 2022-05-12 PROCEDURE — G8420 CALC BMI NORM PARAMETERS: HCPCS | Performed by: FAMILY MEDICINE

## 2022-05-12 PROCEDURE — 99213 OFFICE O/P EST LOW 20 MIN: CPT | Performed by: FAMILY MEDICINE

## 2022-05-12 PROCEDURE — 1036F TOBACCO NON-USER: CPT | Performed by: FAMILY MEDICINE

## 2022-05-12 PROCEDURE — 3046F HEMOGLOBIN A1C LEVEL >9.0%: CPT | Performed by: FAMILY MEDICINE

## 2022-05-12 PROCEDURE — G8427 DOCREV CUR MEDS BY ELIG CLIN: HCPCS | Performed by: FAMILY MEDICINE

## 2022-05-12 NOTE — PATIENT INSTRUCTIONS
PLEASE BRING YOUR MEDICATIONS TO ALL APPOINTMENTS      Please check MY CHART for test results. If you have forgotten your password, please call [phone]. The diagnoses and medications listed in this after visit summary may not be up to date. Please check MY CHART in 28-48 hours for possible corrections. Late cancellation policy: So that we can better accommodate people who are sick, please give our office 24 hour notice for an appointment cancellation. Missed appointments: Your care is very important to us. It is important that you keep your scheduled appointments. Multiple missed appointments will lead to a dismissal from the office. Patients arriving late will be worked into the schedule as time permits, with patients arriving on time taken as scheduled. Late arriving patients are more than welcome to wait or reschedule their appointments. Please allow 5-7 business days for paperwork to be processed. HERE ARE SOME LIFE CHANGING TIPS      1. Take your blood pressure medications at bedtime to reduce your chance of heart attack or stroke  2. Fever in kids:  Give both Tylenol and Ibuprofen at the same time rather than staggering them   3. Follow these tips to reduce childhood obesity: Reduce unnecessary exposure to antibiotics, consume whole milk instead of skim milk, watch public TV instead of regular TV (less exposure to junk food commercials), and reduce traumatic experiences. 4. 1 egg per day is good for your heart  5. Alternate day fasting does promote weight loss. Skipping breakfast increases your risk of obesity  6. Artificially sweetened drinks increase all cause mortality (strokes, body mass index, cardiovascular disease)  7. Kale consumption can reduce onset of dementia  8. Walking at least 8000 steps per day and resistance exercise 2-3 x per week are good for your heart  9. Brushing teeth 3 times per day can decrease chance of getting diabetes  10.  Antibiotic use is associated with a lifetime increased risk of breast cancer and heart disease.

## 2022-05-12 NOTE — PROGRESS NOTES
Patient ID: Lukas Soaresphrey 1995    Chief Complaint   Patient presents with    Diabetes     blood sugar reading HI twice today and in the 400s  took 25 units of insulin this morning         HPI     Diabetes type 1: She is here for her diabetes type 1. She came in today because sugar has been reading high. Her boss told her she go to the emergency room. She did not want to go there. She denies any change in how she has been eating here recently. Last night she had a hot dog with juice. Did not have a snack before bedtime at bedtime her sugar was in the 80s. This morning sugars have been high. She checked again later today and it was again high. She is asking for diabetic shoes. It hurts for her to stand. Review of Systems    Patient Active Problem List   Diagnosis    Type 1 diabetes mellitus with diabetic polyneuropathy ([facility] [address]    DM (diabetes mellitus) ([facility] [address]    Chronic kidney disease (CKD) stage G1/A1, glomerular filtration rate (GFR) equal to or greater than 90 mL/min/1.73 square meter and albuminuria creatinine ratio less than 30 mg/g    Intellectual disability    Illiterate       No past surgical history on file. Family History   Problem Relation Age of Onset    Diabetes Mother     High Blood Pressure Mother     Kidney Disease Mother     Vision Loss Mother     Diabetes Type 1  Father     Diabetes Type 1  Brother        Current Outpatient Medications on File Prior to Visit   Medication Sig Dispense Refill    glucose 4g chewable tablet Take 4 tablets by mouth as needed for Low blood sugar 30 tablet 0    insulin aspart protamine-insulin aspart (NOVOLOG 70/30) (70-30) 100 UNIT/ML injection Inject 25 Units into the skin every morning (before breakfast) AND 25 Units daily (before dinner).  15 pen 11    omeprazole (PRILOSEC) 40 MG delayed release capsule Take 1 capsule by mouth every morning (before breakfast) 30 capsule 1    Insulin Pen Needle (KROGER PEN NEEDLES 29G) 29G X 12MM MISC 1 each by Does not apply route 2 times daily 100 each 11    ibuprofen (ADVIL;MOTRIN) 400 MG tablet Take 1 tablet by mouth daily as needed for Pain 60 tablet 0    gabapentin (NEURONTIN) 800 MG tablet Take 800 mg by mouth 3 times daily. Dr. Blanka House / Hemal Ruvalcaba 1 each by Does not apply route 3 times daily 300 each 2    Continuous Blood Gluc Sensor (FREESTYLE LOU 2 SENSOR) MISC Use 6 times per day 2 each 11    Continuous Blood Gluc  (FREESTYLE LOU 2 READER) KELVIN Check 6 times per day 1 each 11    blood glucose monitor strips Test 3 times/d& as needed for symptoms of irregular blood glucose. Dispense sufficient amount to accommodate needs. 100 strip 1     No current facility-administered medications on file prior to visit. Objective:           Physical Exam  Musculoskeletal:        Feet:    Neurological:      Mental Status: She is alert. Mental status is at baseline. Psychiatric:         Mood and Affect: Mood normal.         Speech: Speech is delayed. Behavior: Behavior is slowed. Cognition and Memory: Cognition is impaired. Vitals:    05/12/22 1456   BP: 120/70   Site: Left Upper Arm   Position: Sitting   Cuff Size: Medium Adult   Pulse: 97   Weight: 114 lb 9.6 oz (52 kg)   Height: 5' (1.524 m)     Body mass index is 22.38 kg/m². Wt Readings from Last 3 Encounters:   05/12/22 114 lb 9.6 oz (52 kg)   02/22/22 119 lb 6.4 oz (54.2 kg)   01/11/22 112 lb (50.8 kg)     BP Readings from Last 3 Encounters:   05/12/22 120/70   02/22/22 110/72   01/11/22 98/64          Results for orders placed or performed in visit on 05/12/22   POCT Glucose   Result Value Ref Range    Glucose 318 mg/dL    QC OK? Assessment:       Diagnosis Orders   1. Type 1 diabetes mellitus with stage 1 chronic kidney disease (HCC)  POCT Glucose   2. Callus of foot     3. Type 1 diabetes mellitus with diabetic polyneuropathy ([facility] [address]     4.  Intellectual disability Plan:        300-400 5 units Humalog  400  10 units Humalog and call office  Continue with 70/30 25 units twice per day    Patient seems to understand the instructions for the Humalog. I think anything more complicated will not be understood unfortunately. She can utilize her Humalog that she has at home. Will write for Humalog pens next week. Patient would benefit from diabetic shoes due to her neuropathy and foot callus. Visit difficulty increased due to patient's intellectual disability. We will see her again next week. Return if symptoms worsen or fail to improve.

## 2022-05-18 ENCOUNTER — TELEPHONE (OUTPATIENT)
Dept: FAMILY MEDICINE CLINIC | Age: 27
End: 2022-05-18

## 2022-05-18 NOTE — TELEPHONE ENCOUNTER
Received phone call from pt that she is having an error with her [address] sensor, based on the error reported by the patient, it sounds as if she was given the incorrect sensor at the pharmacy. She has a [employer] 2 reader and is supposed to get [address] 2 sensors. Pt is advised that if she is unable to use her [address] system, she will have to resume finger sticks. Encouraged pt to contact [employer] for further troubleshooting, unknown if she will or not. RAMSES MaresN RN  Diabetes Educator  [address] [place] [address]  [phone] office  [phone] cell  [phone] fax  [email or web address]

## 2022-05-23 ENCOUNTER — TELEPHONE (OUTPATIENT)
Dept: FAMILY MEDICINE CLINIC | Age: 27
End: 2022-05-23

## 2022-05-23 DIAGNOSIS — E10.42 TYPE 1 DIABETES MELLITUS WITH DIABETIC POLYNEUROPATHY (HCC): ICD-10-CM

## 2022-05-23 DIAGNOSIS — N18.1 TYPE 1 DIABETES MELLITUS WITH STAGE 1 CHRONIC KIDNEY DISEASE (HCC): ICD-10-CM

## 2022-05-23 DIAGNOSIS — E10.22 TYPE 1 DIABETES MELLITUS WITH STAGE 1 CHRONIC KIDNEY DISEASE (HCC): ICD-10-CM

## 2022-05-23 RX ORDER — PEN NEEDLE, DIABETIC 29 GAUGE
1 NEEDLE, DISPOSABLE MISCELLANEOUS 2 TIMES DAILY
Qty: 100 EACH | Refills: 1 | Status: SHIPPED | OUTPATIENT
Start: 2022-05-23

## 2022-05-26 ENCOUNTER — OFFICE VISIT (OUTPATIENT)
Dept: FAMILY MEDICINE CLINIC | Age: 27
End: 2022-05-26
Payer: COMMERCIAL

## 2022-05-26 VITALS
WEIGHT: 112.2 LBS | HEART RATE: 107 BPM | DIASTOLIC BLOOD PRESSURE: 70 MMHG | BODY MASS INDEX: 22.03 KG/M2 | SYSTOLIC BLOOD PRESSURE: 110 MMHG | HEIGHT: 60 IN

## 2022-05-26 DIAGNOSIS — F79 INTELLECTUAL DISABILITY: ICD-10-CM

## 2022-05-26 DIAGNOSIS — E10.42 TYPE 1 DIABETES MELLITUS WITH DIABETIC POLYNEUROPATHY (HCC): Primary | ICD-10-CM

## 2022-05-26 DIAGNOSIS — Z55.0 ILLITERATE: ICD-10-CM

## 2022-05-26 LAB — HBA1C MFR BLD: 9.6 %

## 2022-05-26 PROCEDURE — G8420 CALC BMI NORM PARAMETERS: HCPCS | Performed by: FAMILY MEDICINE

## 2022-05-26 PROCEDURE — 1036F TOBACCO NON-USER: CPT | Performed by: FAMILY MEDICINE

## 2022-05-26 PROCEDURE — 99214 OFFICE O/P EST MOD 30 MIN: CPT | Performed by: FAMILY MEDICINE

## 2022-05-26 PROCEDURE — 3046F HEMOGLOBIN A1C LEVEL >9.0%: CPT | Performed by: FAMILY MEDICINE

## 2022-05-26 PROCEDURE — G8427 DOCREV CUR MEDS BY ELIG CLIN: HCPCS | Performed by: FAMILY MEDICINE

## 2022-05-26 PROCEDURE — 83036 HEMOGLOBIN GLYCOSYLATED A1C: CPT | Performed by: FAMILY MEDICINE

## 2022-05-26 PROCEDURE — 2022F DILAT RTA XM EVC RTNOPTHY: CPT | Performed by: FAMILY MEDICINE

## 2022-05-26 SDOH — EDUCATIONAL SECURITY - EDUCATION ATTAINMENT: ILITERACY AND LOW LEVEL LITERACY: Z55.0

## 2022-05-26 NOTE — PROGRESS NOTES
Patient ID: Estela Perales 1995    . Chief Complaint   Patient presents with    Diabetes         Diabetes  She presents for her initial diabetic visit. She has type 1 diabetes mellitus. Her disease course has been fluctuating. Pertinent negatives for hypoglycemia include no headaches. Current diabetic treatment includes insulin injections. She is compliant with treatment some of the time. She is currently taking insulin pre-breakfast and pre-dinner. Insulin injections are given by patient. (Has had no sugars over 400 recently) She sees a podiatrist.      Review of Systems   Neurological: Negative for headaches. Patient Active Problem List   Diagnosis    Type 1 diabetes mellitus with diabetic polyneuropathy ([facility] [address]    DM (diabetes mellitus) ([facility] [address]    Chronic kidney disease (CKD) stage G1/A1, glomerular filtration rate (GFR) equal to or greater than 90 mL/min/1.73 square meter and albuminuria creatinine ratio less than 30 mg/g    Intellectual disability    Illiterate       No past surgical history on file. Family History   Problem Relation Age of Onset    Diabetes Mother     High Blood Pressure Mother     Kidney Disease Mother     Vision Loss Mother     Diabetes Type 1  Father     Diabetes Type 1  Brother        Current Outpatient Medications on File Prior to Visit   Medication Sig Dispense Refill    Insulin Pen Needle ([employer]ER PEN NEEDLES 29G) 29G X 12MM MISC 1 each by Does not apply route 2 times daily 100 each 1    insulin aspart protamine-insulin aspart (NOVOLOG 70/30) (70-30) 100 UNIT/ML injection Inject 25 Units into the skin every morning (before breakfast) AND 25 Units daily (before dinner).  15 pen 1    glucose 4g chewable tablet Take 4 tablets by mouth as needed for Low blood sugar 30 tablet 0    omeprazole (PRILOSEC) 40 MG delayed release capsule Take 1 capsule by mouth every morning (before breakfast) 30 capsule 1    ibuprofen (ADVIL;MOTRIN) 400 MG tablet Take 1 tablet by mouth daily as needed for Pain 60 tablet 0    Continuous Blood Gluc Sensor (FREESTYLE LOU 2 SENSOR) MISC Use 6 times per day 2 each 11    Continuous Blood Gluc  (FREESTYLE LOU 2 READER) KELVIN Check 6 times per day 1 each 11    Lancets MISC 1 each by Does not apply route 3 times daily 300 each 2    blood glucose monitor strips Test 3 times/d& as needed for symptoms of irregular blood glucose. Dispense sufficient amount to accommodate needs. 100 strip 1    gabapentin (NEURONTIN) 800 MG tablet Take 800 mg by mouth 3 times daily. Dr. Sadiq Hunter / Scot Phillips (Patient not taking: Reported on 5/26/2022)       No current facility-administered medications on file prior to visit. Objective:         Physical Exam  Vitals and nursing note reviewed. Constitutional:       General: She is not in acute distress. Appearance: She is well-developed. Feet:      Comments: Patient walks on her tippy toes. Likely due to neuropathy. Neurological:      Mental Status: She is oriented to person, place, and time. Comments: . Psychiatric:         Attention and Perception: She is attentive. Speech: Speech normal.         Behavior: Behavior is slowed. Thought Content: Thought content normal.         Cognition and Memory: Cognition is impaired. Judgment: Judgment normal.       Vitals:    05/26/22 1522   BP: 110/70   Site: Right Upper Arm   Position: Sitting   Cuff Size: Medium Adult   Pulse: (!) 107   Weight: 112 lb 3.2 oz (50.9 kg)   Height: 5' (1.524 m)     Body mass index is 21.91 kg/m².      Wt Readings from Last 3 Encounters:   05/26/22 112 lb 3.2 oz (50.9 kg)   05/12/22 114 lb 9.6 oz (52 kg)   02/22/22 119 lb 6.4 oz (54.2 kg)     BP Readings from Last 3 Encounters:   05/26/22 110/70   05/12/22 120/70   02/22/22 110/72          Results for orders placed or performed in visit on 05/26/22   POCT glycosylated hemoglobin (Hb A1C)   Result Value Ref Range    Hemoglobin A1C 9.6 %     The ASCVD Risk score (Mitsy Brito et al., 2013) failed to calculate for the following reasons: The 2013 ASCVD risk score is only valid for ages 36 to 78  Lab Review   Office Visit on 05/12/2022   Component Date Value    Glucose 05/12/2022 318            Assessment:       Diagnosis Orders   1. Type 1 diabetes mellitus with diabetic polyneuropathy (HCC)  POCT glycosylated hemoglobin (Hb A1C)   2. Intellectual disability     3. Illiterate             Plan:      A1c is improved. Still have a lot of work to do. I am asking patient to increase her morning insulin from 25 units to 30 units. She is reluctant to do this. She is afraid of hypoglycemia. I told her to make sure she has her sugar tablets available. She understands. Spent a lot of time going through her sugar readings as she was here. Patient has intellectual disability and does not understand her diabetes treatment. She cannot use the dionne on her phone to access the blood sugar readings for her freestyle tia. At least patient is no longer having sugars above 400. Will also utilize information from diabetic educator who interacts with patient on a regular basis. We will recheck with patient in 6 weeks. Return in about 6 weeks (around 7/7/2022) for DM.

## 2022-07-01 ENCOUNTER — TELEPHONE (OUTPATIENT)
Dept: FAMILY MEDICINE CLINIC | Age: 27
End: 2022-07-01

## 2022-07-19 ENCOUNTER — TELEPHONE (OUTPATIENT)
Dept: FAMILY MEDICINE CLINIC | Age: 27
End: 2022-07-19

## 2022-07-19 ENCOUNTER — OFFICE VISIT (OUTPATIENT)
Dept: FAMILY MEDICINE CLINIC | Age: 27
End: 2022-07-19
Payer: COMMERCIAL

## 2022-07-19 VITALS
HEIGHT: 60 IN | HEART RATE: 100 BPM | SYSTOLIC BLOOD PRESSURE: 110 MMHG | WEIGHT: 110 LBS | DIASTOLIC BLOOD PRESSURE: 70 MMHG | BODY MASS INDEX: 21.6 KG/M2

## 2022-07-19 DIAGNOSIS — F79 INTELLECTUAL DISABILITY: Primary | ICD-10-CM

## 2022-07-19 DIAGNOSIS — M26.609 TMJ (TEMPOROMANDIBULAR JOINT SYNDROME): ICD-10-CM

## 2022-07-19 DIAGNOSIS — E10.42 TYPE 1 DIABETES MELLITUS WITH DIABETIC POLYNEUROPATHY (HCC): ICD-10-CM

## 2022-07-19 DIAGNOSIS — E10.22 TYPE 1 DIABETES MELLITUS WITH STAGE 1 CHRONIC KIDNEY DISEASE (HCC): ICD-10-CM

## 2022-07-19 DIAGNOSIS — N18.1 TYPE 1 DIABETES MELLITUS WITH STAGE 1 CHRONIC KIDNEY DISEASE (HCC): ICD-10-CM

## 2022-07-19 PROCEDURE — 2022F DILAT RTA XM EVC RTNOPTHY: CPT | Performed by: FAMILY MEDICINE

## 2022-07-19 PROCEDURE — 99214 OFFICE O/P EST MOD 30 MIN: CPT | Performed by: FAMILY MEDICINE

## 2022-07-19 PROCEDURE — G8427 DOCREV CUR MEDS BY ELIG CLIN: HCPCS | Performed by: FAMILY MEDICINE

## 2022-07-19 PROCEDURE — G8420 CALC BMI NORM PARAMETERS: HCPCS | Performed by: FAMILY MEDICINE

## 2022-07-19 PROCEDURE — 1036F TOBACCO NON-USER: CPT | Performed by: FAMILY MEDICINE

## 2022-07-19 PROCEDURE — 3046F HEMOGLOBIN A1C LEVEL >9.0%: CPT | Performed by: FAMILY MEDICINE

## 2022-07-19 NOTE — TELEPHONE ENCOUNTER
I checked with [facility] regarding Corrinne Fells report:    [11:55 AM] Yola Rosa  I don't have an updated Corrinne Fells report as she has not come in to see me lately. Since she uses the reader, I have to connect to the reader to download the information. If they use the dionne on their phone, I can get it anytime. But her phone wasnt compatible when we last checked.

## 2022-07-20 RX ORDER — IBUPROFEN 400 MG/1
TABLET ORAL
Qty: 60 TABLET | Refills: 0 | OUTPATIENT
Start: 2022-07-20

## 2022-07-20 NOTE — PROGRESS NOTES
Patient ID: Cori Felty 1995    Chief Complaint   Patient presents with    Diabetes     Sensor gumaro, testing blood sugars, new freestyle sensor will be picked up today at patients pharmacy. Reading on sensor is confusing. Would like appt with Shantel to review         Diabetes      Type 1 diabetes: Patient does not really know how to use her freestyle tai to reader reports. She has random blood sugar readings. She has sugars that range from . She thinks she may be having lower blood sugar readings in the evenings. If she is having a day where she does not feel well and her stomach is nauseated, she will take no insulin. Also feels hot outside she will eat less at dinnertime. She eats dinner around 8:00. She is not hungry at 6:00. Review of Systems    Patient Active Problem List   Diagnosis    Type 1 diabetes mellitus with diabetic polyneuropathy (HCC)    DM (diabetes mellitus) ([facility]    Chronic kidney disease (CKD) stage G1/A1, glomerular filtration rate (GFR) equal to or greater than 90 mL/min/1.73 square meter and albuminuria creatinine ratio less than 30 mg/g    Intellectual disability    Illiterate       No past surgical history on file.     Family History   Problem Relation Age of Onset    Diabetes Mother     High Blood Pressure Mother     Kidney Disease Mother     Vision Loss Mother     Diabetes Type 1  Father     Diabetes Type 1  Brother        Current Outpatient Medications on File Prior to Visit   Medication Sig Dispense Refill    Insulin Pen Needle (KROGER PEN NEEDLES 29G) 29G X 12MM MISC 1 each by Does not apply route 2 times daily 100 each 1    glucose 4g chewable tablet Take 4 tablets by mouth as needed for Low blood sugar 30 tablet 0    omeprazole (PRILOSEC) 40 MG delayed release capsule Take 1 capsule by mouth every morning (before breakfast) 30 capsule 1    ibuprofen (ADVIL;MOTRIN) 400 MG tablet Take 1 tablet by mouth daily as needed for Pain 60 tablet 0    gabapentin (NEURONTIN) 800 MG tablet Take 800 mg by mouth in the morning. Dr. Anderson Rousseau / Lc Dsouza. Lancets MISC 1 each by Does not apply route 3 times daily 300 each 2    blood glucose monitor strips Test 3 times/d& as needed for symptoms of irregular blood glucose. Dispense sufficient amount to accommodate needs. 100 strip 1    Continuous Blood Gluc Sensor (FREESTYLE LOU 2 SENSOR) MISC Use 6 times per day (Patient not taking: Reported on 7/19/2022) 2 each 11    Continuous Blood Gluc  (FREESTYLE LOU 2 READER) KELVIN Check 6 times per day (Patient not taking: Reported on 7/19/2022) 1 each 11     No current facility-administered medications on file prior to visit. Objective:           Physical Exam  Neurological:      Mental Status: She is alert. Mental status is at baseline. Psychiatric:         Mood and Affect: Mood normal.         Speech: Speech is not delayed. Behavior: Behavior is slowed. Cognition and Memory: Cognition is impaired. Memory is not impaired. Vitals:    07/19/22 1601   BP: 110/70   Site: Left Upper Arm   Position: Sitting   Cuff Size: Medium Adult   Pulse: 100   Weight: 110 lb (49.9 kg)   Height: 5' (1.524 m)     Body mass index is 21.48 kg/m². Wt Readings from Last 3 Encounters:   07/19/22 110 lb (49.9 kg)   05/26/22 112 lb 3.2 oz (50.9 kg)   05/12/22 114 lb 9.6 oz (52 kg)     BP Readings from Last 3 Encounters:   07/19/22 110/70   05/26/22 110/70   05/12/22 120/70          No results found for this visit on 07/19/22. Assessment:       Diagnosis Orders   1. Intellectual disability        2. Type 1 diabetes mellitus with stage 1 chronic kidney disease (HCC)  insulin aspart protamine-insulin aspart (NOVOLOG 70/30) (70-30) 100 UNIT/ML injection      3.  Type 1 diabetes mellitus with diabetic polyneuropathy (HCC)  insulin aspart protamine-insulin aspart (NOVOLOG 70/30) (70-30) 100 UNIT/ML injection              Plan:      Patient's diabetes is difficult to treat because of her intellectual disability and her inability to properly utilize her freestyle tia. I am thinking she may be having some of her lower sugars in the evenings. Therefore will ask her to eat dinner earlier and to reduce her evening insulin from 25 units down to 20 units. She is to take 30 units in the morning of her 70/30. Recheck in 6 weeks and we will check an A1c at that time. Recommend she take at least 10 units of insulin on days she is not eating since she is type 1 diabetic. I doubt we will ever get patient's diabetes to be under control due to her intellectual disability. Hoping to get the A1c below 9    Visit made more difficult to to intellectual disability and no family member here to help her out. F/u diabetic educator as scheduled. Return in about 7 weeks (around 9/6/2022) for DM.

## 2022-09-07 ENCOUNTER — OFFICE VISIT (OUTPATIENT)
Dept: FAMILY MEDICINE CLINIC | Age: 27
End: 2022-09-07
Payer: COMMERCIAL

## 2022-09-07 VITALS
HEIGHT: 60 IN | DIASTOLIC BLOOD PRESSURE: 62 MMHG | HEART RATE: 85 BPM | SYSTOLIC BLOOD PRESSURE: 98 MMHG | OXYGEN SATURATION: 99 % | WEIGHT: 112.6 LBS | BODY MASS INDEX: 22.1 KG/M2

## 2022-09-07 DIAGNOSIS — N18.1 TYPE 1 DIABETES MELLITUS WITH STAGE 1 CHRONIC KIDNEY DISEASE (HCC): ICD-10-CM

## 2022-09-07 DIAGNOSIS — F79 INTELLECTUAL DISABILITY: ICD-10-CM

## 2022-09-07 DIAGNOSIS — E10.22 TYPE 1 DIABETES MELLITUS WITH STAGE 1 CHRONIC KIDNEY DISEASE (HCC): ICD-10-CM

## 2022-09-07 DIAGNOSIS — Z23 NEEDS FLU SHOT: ICD-10-CM

## 2022-09-07 DIAGNOSIS — M26.609 TMJ (TEMPOROMANDIBULAR JOINT SYNDROME): ICD-10-CM

## 2022-09-07 DIAGNOSIS — E10.42 TYPE 1 DIABETES MELLITUS WITH DIABETIC POLYNEUROPATHY (HCC): Primary | ICD-10-CM

## 2022-09-07 LAB — HBA1C MFR BLD: 9.3 %

## 2022-09-07 PROCEDURE — 83036 HEMOGLOBIN GLYCOSYLATED A1C: CPT | Performed by: FAMILY MEDICINE

## 2022-09-07 PROCEDURE — 2022F DILAT RTA XM EVC RTNOPTHY: CPT | Performed by: FAMILY MEDICINE

## 2022-09-07 PROCEDURE — 99214 OFFICE O/P EST MOD 30 MIN: CPT | Performed by: FAMILY MEDICINE

## 2022-09-07 PROCEDURE — 36415 COLL VENOUS BLD VENIPUNCTURE: CPT | Performed by: FAMILY MEDICINE

## 2022-09-07 PROCEDURE — 1036F TOBACCO NON-USER: CPT | Performed by: FAMILY MEDICINE

## 2022-09-07 PROCEDURE — 90674 CCIIV4 VAC NO PRSV 0.5 ML IM: CPT | Performed by: FAMILY MEDICINE

## 2022-09-07 PROCEDURE — G8427 DOCREV CUR MEDS BY ELIG CLIN: HCPCS | Performed by: FAMILY MEDICINE

## 2022-09-07 PROCEDURE — 3046F HEMOGLOBIN A1C LEVEL >9.0%: CPT | Performed by: FAMILY MEDICINE

## 2022-09-07 PROCEDURE — G8420 CALC BMI NORM PARAMETERS: HCPCS | Performed by: FAMILY MEDICINE

## 2022-09-07 PROCEDURE — 90471 IMMUNIZATION ADMIN: CPT | Performed by: FAMILY MEDICINE

## 2022-09-07 RX ORDER — IBUPROFEN 400 MG/1
400 TABLET ORAL DAILY PRN
Qty: 60 TABLET | Refills: 0 | Status: SHIPPED | OUTPATIENT
Start: 2022-09-07

## 2022-09-07 NOTE — PROGRESS NOTES
Patient ID: Estela Perales 1995    . Chief Complaint   Patient presents with    Diabetes    Sweats     Every time she eats has Swets and a runny nose          HPI    Diabetes        Type 1 diabetes:  She has random blood sugar readings. She has sugars that range from   She thinks she may be having lower blood sugar readings in the evenings. she often eats a small dinner because she is not hungry. Lunch is her largest meal.  Sometimes take sugar tablet when she thinks her sugar might be low. Is out of the sugar tablets and plans on going to drug store to buy more. Sweats: sweats and runny nose when she eats, especially hot foods    Jaw pain: asking for script for ibuprofen has not seen dentist yet    Review of Systems    Patient Active Problem List   Diagnosis    Type 1 diabetes mellitus with diabetic polyneuropathy ([facility] [address]    DM (diabetes mellitus) ([facility] [address]    Chronic kidney disease (CKD) stage G1/A1, glomerular filtration rate (GFR) equal to or greater than 90 mL/min/1.73 square meter and albuminuria creatinine ratio less than 30 mg/g    Intellectual disability    Illiterate       No past surgical history on file.     Family History   Problem Relation Age of Onset    Diabetes Mother     High Blood Pressure Mother     Kidney Disease Mother     Vision Loss Mother     Diabetes Type 1  Father     Diabetes Type 1  Brother        Current Outpatient Medications on File Prior to Visit   Medication Sig Dispense Refill    Insulin Pen Needle (KROGER PEN NEEDLES 29G) 29G X 12MM MISC 1 each by Does not apply route 2 times daily 100 each 1    omeprazole (PRILOSEC) 40 MG delayed release capsule Take 1 capsule by mouth every morning (before breakfast) 30 capsule 1    Continuous Blood Gluc Sensor (FREESTYLE LOU 2 SENSOR) MISC Use 6 times per day 2 each 11    Continuous Blood Gluc  (FREESTYLE LOU 2 READER) KELVIN Check 6 times per day 1 each 11    gabapentin (NEURONTIN) 800 MG tablet Take 800 mg by mouth in the morning. Dr. Louise Lyon / Micha Noriega. Lancets MISC 1 each by Does not apply route 3 times daily 300 each 2    blood glucose monitor strips Test 3 times/d& as needed for symptoms of irregular blood glucose. Dispense sufficient amount to accommodate needs. 100 strip 1     No current facility-administered medications on file prior to visit. Objective:         Physical Exam  Vitals and nursing note reviewed. Constitutional:       Appearance: She is well-developed. HENT:      Head: Normocephalic and atraumatic. Cardiovascular:      Rate and Rhythm: Normal rate and regular rhythm. Heart sounds: Normal heart sounds, S1 normal and S2 normal.   Pulmonary:      Effort: Pulmonary effort is normal. No respiratory distress. Breath sounds: Normal breath sounds. No wheezing. Musculoskeletal:      Cervical back: Neck supple. Skin:     General: Skin is warm and dry. Neurological:      Mental Status: She is alert. Mental status is at baseline. Psychiatric:         Mood and Affect: Mood normal.         Speech: Speech is delayed. Cognition and Memory: Cognition is impaired. Vitals:    09/07/22 0850   BP: 98/62   Site: Left Upper Arm   Position: Sitting   Cuff Size: Medium Adult   Pulse: 85   SpO2: 99%   Weight: 112 lb 9.6 oz (51.1 kg)   Height: 5' (1.524 m)     Body mass index is 21.99 kg/m². Wt Readings from Last 3 Encounters:   09/07/22 112 lb 9.6 oz (51.1 kg)   07/19/22 110 lb (49.9 kg)   05/26/22 112 lb 3.2 oz (50.9 kg)     BP Readings from Last 3 Encounters:   09/07/22 98/62   07/19/22 110/70   05/26/22 110/70          Results for orders placed or performed in visit on 09/07/22   POCT glycosylated hemoglobin (Hb A1C)   Result Value Ref Range    Hemoglobin A1C 9.3 %     The ASCVD Risk score (Rachel Chen., et al., 2013) failed to calculate for the following reasons:     The 2013 ASCVD risk score is only valid for ages 36 to 78  Lab Review   No visits with results within

## 2022-09-16 ENCOUNTER — TELEPHONE (OUTPATIENT)
Dept: FAMILY MEDICINE CLINIC | Age: 27
End: 2022-09-16

## 2022-09-16 NOTE — TELEPHONE ENCOUNTER
----- Message from Lissa Grossman sent at 9/15/2022  1:25 PM EDT -----  Subject: Refill Request    QUESTIONS  Name of Medication? Insulin Pen Needle (KROGER PEN NEEDLES 29G) 29G X 12MM   MISC  Patient-reported dosage and instructions? twice a day   How many days do you have left? 2  [facility] [id number]  Pharmacy phone number (if available)?   ---------------------------------------------------------------------------  --------------,  Name of Medication? insulin aspart protamine-insulin aspart (NOVOLOG   70/30) (70-30) 100 UNIT/ML injection  Patient-reported dosage and instructions? twice a day   How many days do you have left? 2  [facility]? [facility] [id number]  Pharmacy phone number (if available)?   ---------------------------------------------------------------------------  --------------  CALL BACK INFO  What is the best way for the office to contact you? OK to leave message on   voicemail  Preferred Call Back Phone Number? [phone]  ---------------------------------------------------------------------------  --------------  SCRIPT ANSWERS  Relationship to Patient?  Self

## 2022-11-03 ENCOUNTER — TELEPHONE (OUTPATIENT)
Dept: FAMILY MEDICINE CLINIC | Age: 27
End: 2022-11-03

## 2022-11-03 DIAGNOSIS — E10.42 TYPE 1 DIABETES MELLITUS WITH DIABETIC POLYNEUROPATHY (HCC): ICD-10-CM

## 2022-11-03 RX ORDER — IBUPROFEN 200 MG
1 TABLET ORAL
Qty: 200 EACH | Refills: 11 | Status: SHIPPED | OUTPATIENT
Start: 2022-11-03

## 2022-11-03 NOTE — TELEPHONE ENCOUNTER
Patient stopped in the office stating that her senor does not work and she was needing a new one. She wanted to see if we could send refills to the pharmacy, patient was told that she needed to check with her pharmacy or call the number on the back of the [employer] box to let them know that her senor is not working and they should be able to send her a new one. Patient also stated she needed refills on her Novolog and also insulin syringe. Please send medication to [facility].  Please advise

## 2022-11-29 ENCOUNTER — OFFICE VISIT (OUTPATIENT)
Dept: FAMILY MEDICINE CLINIC | Age: 27
End: 2022-11-29
Payer: COMMERCIAL

## 2022-11-29 ENCOUNTER — TELEPHONE (OUTPATIENT)
Dept: FAMILY MEDICINE CLINIC | Age: 27
End: 2022-11-29

## 2022-11-29 ENCOUNTER — HOSPITAL ENCOUNTER (OUTPATIENT)
Age: 27
Discharge: HOME | End: 2022-11-29
Payer: COMMERCIAL

## 2022-11-29 ENCOUNTER — HOSPITAL ENCOUNTER (OUTPATIENT)
Dept: GENERAL RADIOLOGY | Age: 27
Discharge: HOME | End: 2022-11-29
Payer: COMMERCIAL

## 2022-11-29 VITALS
DIASTOLIC BLOOD PRESSURE: 80 MMHG | HEART RATE: 89 BPM | WEIGHT: 113 LBS | OXYGEN SATURATION: 98 % | SYSTOLIC BLOOD PRESSURE: 122 MMHG | BODY MASS INDEX: 22.07 KG/M2 | TEMPERATURE: 97.2 F

## 2022-11-29 DIAGNOSIS — K59.00 CONSTIPATION, UNSPECIFIED CONSTIPATION TYPE: ICD-10-CM

## 2022-11-29 DIAGNOSIS — R05.1 ACUTE COUGH: ICD-10-CM

## 2022-11-29 DIAGNOSIS — R10.30 LOWER ABDOMINAL PAIN: ICD-10-CM

## 2022-11-29 DIAGNOSIS — E10.42 TYPE 1 DIABETES MELLITUS WITH DIABETIC POLYNEUROPATHY (HCC): ICD-10-CM

## 2022-11-29 DIAGNOSIS — R10.84 DIFFUSE ABDOMINAL PAIN: ICD-10-CM

## 2022-11-29 DIAGNOSIS — R10.30 LOWER ABDOMINAL PAIN: Primary | ICD-10-CM

## 2022-11-29 LAB
ALBUMIN SERPL-MCNC: 4.5 GM/DL (ref 3.4–5)
ALP BLD-CCNC: 94 IU/L (ref 40–128)
ALT SERPL-CCNC: <5 U/L (ref 10–40)
ANION GAP SERPL CALCULATED.3IONS-SCNC: 14 MMOL/L (ref 4–16)
AST SERPL-CCNC: 7 IU/L (ref 15–37)
BASOPHILS ABSOLUTE: 0.1 K/CU MM
BASOPHILS RELATIVE PERCENT: 0.6 % (ref 0–1)
BILIRUB SERPL-MCNC: 0.6 MG/DL (ref 0–1)
BILIRUBIN, POC: ABNORMAL
BLOOD URINE, POC: ABNORMAL
BUN BLDV-MCNC: 8 MG/DL (ref 6–23)
CALCIUM SERPL-MCNC: 9.7 MG/DL (ref 8.3–10.6)
CHLORIDE BLD-SCNC: 100 MMOL/L (ref 99–110)
CHOLESTEROL: 195 MG/DL
CLARITY, POC: CLEAR
CO2: 25 MMOL/L (ref 21–32)
COLOR, POC: YELLOW
CREAT SERPL-MCNC: 0.5 MG/DL (ref 0.6–1.1)
DIFFERENTIAL TYPE: ABNORMAL
EOSINOPHILS ABSOLUTE: 0 K/CU MM
EOSINOPHILS RELATIVE PERCENT: 0.1 % (ref 0–3)
GFR SERPL CREATININE-BSD FRML MDRD: >60 ML/MIN/1.73M2
GLUCOSE BLD-MCNC: 334 MG/DL (ref 70–99)
GLUCOSE URINE, POC: 500
HCT VFR BLD CALC: 46.1 % (ref 37–47)
HDLC SERPL-MCNC: 41 MG/DL
HEMOGLOBIN: 14.9 GM/DL (ref 12.5–16)
IMMATURE NEUTROPHIL %: 0.2 % (ref 0–0.43)
KETONES, POC: 80
LDL CHOLESTEROL CALCULATED: 126 MG/DL
LEUKOCYTE EST, POC: ABNORMAL
LYMPHOCYTES ABSOLUTE: 2.5 K/CU MM
LYMPHOCYTES RELATIVE PERCENT: 28.2 % (ref 24–44)
MCH RBC QN AUTO: 28.5 PG (ref 27–31)
MCHC RBC AUTO-ENTMCNC: 32.3 % (ref 32–36)
MCV RBC AUTO: 88.3 FL (ref 78–100)
MONOCYTES ABSOLUTE: 0.4 K/CU MM
MONOCYTES RELATIVE PERCENT: 4.6 % (ref 0–4)
NITRITE, POC: ABNORMAL
NUCLEATED RBC %: 0 %
PDW BLD-RTO: 11.1 % (ref 11.7–14.9)
PH, POC: 5.5
PLATELET # BLD: 311 K/CU MM (ref 140–440)
PMV BLD AUTO: 10.2 FL (ref 7.5–11.1)
POTASSIUM SERPL-SCNC: 4 MMOL/L (ref 3.5–5.1)
PROTEIN, POC: ABNORMAL
RBC # BLD: 5.22 M/CU MM (ref 4.2–5.4)
SEGMENTED NEUTROPHILS ABSOLUTE COUNT: 5.9 K/CU MM
SEGMENTED NEUTROPHILS RELATIVE PERCENT: 66.3 % (ref 36–66)
SODIUM BLD-SCNC: 139 MMOL/L (ref 135–145)
SPECIFIC GRAVITY, POC: 1.02
TOTAL IMMATURE NEUTOROPHIL: 0.02 K/CU MM
TOTAL NUCLEATED RBC: 0 K/CU MM
TOTAL PROTEIN: 7.6 GM/DL (ref 6.4–8.2)
TRIGL SERPL-MCNC: 139 MG/DL
UROBILINOGEN, POC: 0.2
WBC # BLD: 8.9 K/CU MM (ref 4–10.5)

## 2022-11-29 PROCEDURE — 99214 OFFICE O/P EST MOD 30 MIN: CPT | Performed by: FAMILY MEDICINE

## 2022-11-29 PROCEDURE — G8482 FLU IMMUNIZE ORDER/ADMIN: HCPCS | Performed by: FAMILY MEDICINE

## 2022-11-29 PROCEDURE — G8427 DOCREV CUR MEDS BY ELIG CLIN: HCPCS | Performed by: FAMILY MEDICINE

## 2022-11-29 PROCEDURE — 1036F TOBACCO NON-USER: CPT | Performed by: FAMILY MEDICINE

## 2022-11-29 PROCEDURE — 85025 COMPLETE CBC W/AUTO DIFF WBC: CPT

## 2022-11-29 PROCEDURE — 74019 RADEX ABDOMEN 2 VIEWS: CPT

## 2022-11-29 PROCEDURE — 36415 COLL VENOUS BLD VENIPUNCTURE: CPT

## 2022-11-29 PROCEDURE — 80053 COMPREHEN METABOLIC PANEL: CPT

## 2022-11-29 PROCEDURE — 80061 LIPID PANEL: CPT

## 2022-11-29 PROCEDURE — 83036 HEMOGLOBIN GLYCOSYLATED A1C: CPT

## 2022-11-29 PROCEDURE — 81002 URINALYSIS NONAUTO W/O SCOPE: CPT | Performed by: FAMILY MEDICINE

## 2022-11-29 PROCEDURE — 83690 ASSAY OF LIPASE: CPT

## 2022-11-29 PROCEDURE — 2022F DILAT RTA XM EVC RTNOPTHY: CPT | Performed by: FAMILY MEDICINE

## 2022-11-29 PROCEDURE — G8420 CALC BMI NORM PARAMETERS: HCPCS | Performed by: FAMILY MEDICINE

## 2022-11-29 PROCEDURE — 3046F HEMOGLOBIN A1C LEVEL >9.0%: CPT | Performed by: FAMILY MEDICINE

## 2022-11-29 NOTE — TELEPHONE ENCOUNTER
Patient called the nurse triage phone stating she is having some abdominal pain, vomiting and coughing, she stated the coughing started today, the other symptoms started on Sunday. Patient stated she has no other symptoms other then the abdominal pain, vomiting and coughing.  Please advise what type of appointment patient would need, she is asking to be seen

## 2022-11-29 NOTE — PROGRESS NOTES
Patient ID: Estela Perales 1995    . Chief Complaint   Patient presents with    Abdominal Pain     Lower abdominal pain since Sunday states she had steak n shake then next day had a stomach ache       Cough     Started Yesterday          HPI    Cough: mild. Dry. Sweats because she has been hot. No F or chills. Abdominal pain: lower. Since eating steak and shake 3 days ago. Tried ginger ale and crackers. Vomited yesterday and day before. Pain is severe. She is wondering if she has food poisoning. Last BM 2 days ago      Patient Active Problem List   Diagnosis    Type 1 diabetes mellitus with diabetic polyneuropathy (HCC)    DM (diabetes mellitus) ([facility] [address]    Chronic kidney disease (CKD) stage G1/A1, glomerular filtration rate (GFR) equal to or greater than 90 mL/min/1.73 square meter and albuminuria creatinine ratio less than 30 mg/g    Intellectual disability    Illiterate       No past surgical history on file. Family History   Problem Relation Age of Onset    Diabetes Mother     High Blood Pressure Mother     Kidney Disease Mother     Vision Loss Mother     Diabetes Type 1  Father     Diabetes Type 1  Brother        Current Outpatient Medications on File Prior to Visit   Medication Sig Dispense Refill    INSULIN SYRINGE .5CC/29G (Heddie Decant INS SYR .5CC/29G) 29G X 1/2\" 0.5 ML MISC 1 each by Does not apply route 5 times daily 200 each 11    ibuprofen (ADVIL;MOTRIN) 400 MG tablet Take 1 tablet by mouth daily as needed for Pain 60 tablet 0    insulin aspart protamine-insulin aspart (NOVOLOG 70/30) (70-30) 100 UNIT/ML injection Inject 35 Units into the skin daily (with breakfast) AND 18 Units daily (before dinner).  15.9 mL 2    glucose 4 g chewable tablet Take 4 tablets by mouth as needed for Low blood sugar 30 tablet 0    Insulin Pen Needle (KROGER PEN NEEDLES 29G) 29G X 12MM MISC 1 each by Does not apply route 2 times daily 100 each 1    omeprazole (PRILOSEC) 40 MG delayed release capsule Take 1 capsule by mouth every morning (before breakfast) 30 capsule 1    Continuous Blood Gluc Sensor (FREESTYLE LOU 2 SENSOR) MISC Use 6 times per day 2 each 11    Continuous Blood Gluc  (FREESTYLE LOU 2 READER) KELVIN Check 6 times per day 1 each 11    gabapentin (NEURONTIN) 800 MG tablet Take 800 mg by mouth in the morning. Dr. Allison Lawrence / Socorro Choi. Lancets MISC 1 each by Does not apply route 3 times daily 300 each 2    blood glucose monitor strips Test 3 times/d& as needed for symptoms of irregular blood glucose. Dispense sufficient amount to accommodate needs. 100 strip 1     No current facility-administered medications on file prior to visit. Objective:         Physical Exam  Vitals and nursing note reviewed. Constitutional:       General: She is not in acute distress. Appearance: She is well-developed. She is ill-appearing. HENT:      Head: Normocephalic and atraumatic. Right Ear: Hearing, tympanic membrane and external ear normal. There is impacted cerumen. Left Ear: Hearing, tympanic membrane and external ear normal. There is impacted cerumen. Nose: Nose normal. No nasal deformity, laceration, mucosal edema or rhinorrhea. Right Sinus: No maxillary sinus tenderness or frontal sinus tenderness. Left Sinus: No maxillary sinus tenderness or frontal sinus tenderness. Mouth/Throat:      Mouth: Mucous membranes are moist.      Pharynx: No oropharyngeal exudate or posterior oropharyngeal erythema. Eyes:      Conjunctiva/sclera: Conjunctivae normal.   Neck:      Thyroid: No thyromegaly. Trachea: No tracheal deviation. Cardiovascular:      Rate and Rhythm: Normal rate and regular rhythm. Heart sounds: Normal heart sounds, S1 normal and S2 normal.     No friction rub. No gallop. Pulmonary:      Effort: No respiratory distress. Breath sounds: No wheezing or rales. Abdominal:      General: Bowel sounds are normal.      Tenderness:  There is generalized abdominal tenderness. Lymphadenopathy:      Head:      Right side of head: No submental, submandibular or posterior auricular adenopathy. Left side of head: No submental, submandibular or posterior auricular adenopathy. Cervical:      Right cervical: No superficial, deep or posterior cervical adenopathy. Left cervical: No superficial, deep or posterior cervical adenopathy. Skin:     General: Skin is warm and dry. Neurological:      Comments: No facial droop. Moving all extremities   Psychiatric:         Attention and Perception: She is attentive. Mood and Affect: Mood is anxious. Speech: Speech normal.         Behavior: Behavior normal.         Thought Content: Thought content normal.         Cognition and Memory: Cognition is impaired. Vitals:    11/29/22 1441   BP: 122/80   Site: Right Upper Arm   Position: Sitting   Cuff Size: Medium Adult   Pulse: 89   Temp: 97.2 °F (36.2 °C)   TempSrc: Temporal   SpO2: 98%   Weight: 113 lb (51.3 kg)     Body mass index is 22.07 kg/m². Wt Readings from Last 3 Encounters:   11/29/22 113 lb (51.3 kg)   09/07/22 112 lb 9.6 oz (51.1 kg)   07/19/22 110 lb (49.9 kg)     BP Readings from Last 3 Encounters:   11/29/22 122/80   09/07/22 98/62   07/19/22 110/70          Results for orders placed or performed in visit on 11/29/22   POCT Urinalysis no Micro   Result Value Ref Range    Color, UA yellow     Clarity, UA clear     Glucose, UA      Bilirubin, UA neg     Ketones, UA 80     Spec Grav, UA 1.020     Blood, UA POC trace     pH, UA 5.5     Protein, UA POC trace     Urobilinogen, UA 0.2     Leukocytes, UA neg     Nitrite, UA neg      The ASCVD Risk score (Hair DK, et al., 2019) failed to calculate for the following reasons: The 2019 ASCVD risk score is only valid for ages 36 to 78  Lab Review not applicable    Lab Review   No visits with results within 2 Month(s) from this visit.    Latest known visit with results is:   Office Visit on 09/07/2022   Component Date Value    Hemoglobin A1C 09/07/2022 9.3         Stool throughout the colon suggestive of mild constipation. No gross   obstruction or acute finding by plain film imaging. Assessment:       Diagnosis Orders   1. Lower abdominal pain  POCT Urinalysis no Micro    XR ABDOMEN STANDARD    Comprehensive Metabolic Panel    CBC with Auto Differential    Lipase      2. Acute cough  COVID-19      3. Diffuse abdominal pain  XR ABDOMEN STANDARD    Comprehensive Metabolic Panel    CBC with Auto Differential    Lipase      4. Type 1 diabetes mellitus with diabetic polyneuropathy (HCC)  Microalbumin / Creatinine Urine Ratio    Hemoglobin A1C      5. Constipation, unspecified constipation type                Plan:      See orders    Offered phenergan shot but she declined    Stat labs and xray      No follow-ups on file.

## 2022-11-30 LAB
CREATININE URINE: 108.5 MG/DL (ref 28–259)
ESTIMATED AVERAGE GLUCOSE: 217 MG/DL
HBA1C MFR BLD: 9.2 % (ref 4.2–6.3)
LIPASE: 12 IU/L (ref 13–60)
MICROALBUMIN UR-MCNC: 5.9 MG/DL
MICROALBUMIN/CREAT UR-RTO: 54.4 MG/G (ref 0–30)
SARS-COV-2: NOT DETECTED

## 2022-11-30 NOTE — TELEPHONE ENCOUNTER
Let her know that so far, the labs are normal.  The xray shows a lot of stool. Recommend she get OTC laxative to clean out her bowels and this should help with the abdominal pain. Recommend that half of what she eats be vegies and fruits to help her keep her bowels regular.       F/u as scheduled next week

## 2022-12-07 ENCOUNTER — OFFICE VISIT (OUTPATIENT)
Dept: FAMILY MEDICINE CLINIC | Age: 27
End: 2022-12-07

## 2022-12-07 VITALS
BODY MASS INDEX: 23.16 KG/M2 | DIASTOLIC BLOOD PRESSURE: 60 MMHG | TEMPERATURE: 97.3 F | SYSTOLIC BLOOD PRESSURE: 112 MMHG | HEIGHT: 60 IN | WEIGHT: 118 LBS | HEART RATE: 105 BPM

## 2022-12-07 DIAGNOSIS — E11.00 TYPE 2 DIABETES MELLITUS WITH HYPEROSMOLARITY, UNCONTROLLED (HCC): ICD-10-CM

## 2022-12-07 DIAGNOSIS — E10.22 TYPE 1 DIABETES MELLITUS WITH STAGE 1 CHRONIC KIDNEY DISEASE (HCC): ICD-10-CM

## 2022-12-07 DIAGNOSIS — N18.1 TYPE 1 DIABETES MELLITUS WITH STAGE 1 CHRONIC KIDNEY DISEASE (HCC): ICD-10-CM

## 2022-12-07 DIAGNOSIS — R80.9 TYPE 1 DIABETES MELLITUS WITH MICROALBUMINURIA (HCC): Primary | ICD-10-CM

## 2022-12-07 DIAGNOSIS — F79 INTELLECTUAL DISABILITY: ICD-10-CM

## 2022-12-07 DIAGNOSIS — H02.846 SWOLLEN EYELID, LEFT: ICD-10-CM

## 2022-12-07 DIAGNOSIS — E10.29 TYPE 1 DIABETES MELLITUS WITH MICROALBUMINURIA (HCC): Primary | ICD-10-CM

## 2022-12-07 DIAGNOSIS — E11.65 TYPE 2 DIABETES MELLITUS WITH HYPEROSMOLARITY, UNCONTROLLED (HCC): ICD-10-CM

## 2022-12-07 DIAGNOSIS — E10.42 TYPE 1 DIABETES MELLITUS WITH DIABETIC POLYNEUROPATHY (HCC): ICD-10-CM

## 2022-12-07 DIAGNOSIS — H55.89: ICD-10-CM

## 2022-12-07 RX ORDER — LISINOPRIL 5 MG/1
2.5 TABLET ORAL DAILY
Qty: 45 TABLET | Refills: 0 | Status: SHIPPED | OUTPATIENT
Start: 2022-12-07

## 2022-12-07 RX ORDER — CIPROFLOXACIN HYDROCHLORIDE 3.5 MG/ML
1 SOLUTION/ DROPS TOPICAL
Qty: 1 EACH | Refills: 0 | Status: SHIPPED | OUTPATIENT
Start: 2022-12-07 | End: 2022-12-17

## 2022-12-07 NOTE — PROGRESS NOTES
Patient ID: Estela Perales 1995    . Chief Complaint   Patient presents with    Diabetes    Eye Problem     Left eye swollen, x 1 week         HPI    Type 1 diabetes:  She has random blood sugar readings. She has sugars that range from   She does not know how to use her [employer] luiz tia for reports. She cannot provide me with glucose trends. She goes low everyday, but then says it's not daily. Left eye problem: left eye is swollen. Went to ER last week and got \"eye drops\". It's no better. Has eye doctor appointment on Friday December 9. No vision problems. Patient Active Problem List   Diagnosis    Type 1 diabetes mellitus with diabetic polyneuropathy (HCC)    DM (diabetes mellitus) (HCC)    Chronic kidney disease (CKD) stage G1/A1, glomerular filtration rate (GFR) equal to or greater than 90 mL/min/1.73 square meter and albuminuria creatinine ratio less than 30 mg/g    Intellectual disability    Illiterate    Type 2 diabetes mellitus with hyperosmolarity, uncontrolled ([address]       No past surgical history on file. Family History   Problem Relation Age of Onset    Diabetes Mother     High Blood Pressure Mother     Kidney Disease Mother     Vision Loss Mother     Diabetes Type 1  Father     Diabetes Type 1  Brother        Current Outpatient Medications on File Prior to Visit   Medication Sig Dispense Refill    INSULIN SYRINGE .5CC/29G (Jacy Modi INS SYR .5CC/29G) 29G X 1/2\" 0.5 ML MISC 1 each by Does not apply route 5 times daily 200 each 11    ibuprofen (ADVIL;MOTRIN) 400 MG tablet Take 1 tablet by mouth daily as needed for Pain 60 tablet 0    Insulin Pen Needle (KR[employer]ER PEN NEEDLES 29G) 29G X 12MM MISC 1 each by Does not apply route 2 times daily 100 each 1    omeprazole (PRILOSEC) 40 MG delayed release capsule Take 1 capsule by mouth every morning (before breakfast) 30 capsule 1    gabapentin (NEURONTIN) 800 MG tablet Take 800 mg by mouth in the morning. Dr. Phu Pfeiffer / Tavares Stone. blood glucose monitor strips Test 3 times/d& as needed for symptoms of irregular blood glucose. Dispense sufficient amount to accommodate needs. 100 strip 1    glucose 4 g chewable tablet Take 4 tablets by mouth as needed for Low blood sugar (Patient not taking: Reported on 12/7/2022) 30 tablet 0    Lancets MISC 1 each by Does not apply route 3 times daily 300 each 2     No current facility-administered medications on file prior to visit. Objective:         Physical Exam  Vitals and nursing note reviewed. Constitutional:       Appearance: She is well-developed. HENT:      Head: Normocephalic and atraumatic. Eyes:      Extraocular Movements:      Left eye: No nystagmus. Conjunctiva/sclera:      Left eye: Left conjunctiva is not injected. No exudate. Comments: Left upper lid is swollen. Left eye does not look medially   Cardiovascular:      Rate and Rhythm: Normal rate and regular rhythm. Heart sounds: Normal heart sounds, S1 normal and S2 normal.   Pulmonary:      Effort: Pulmonary effort is normal. No respiratory distress. Breath sounds: Normal breath sounds. No wheezing. Musculoskeletal:      Cervical back: Neck supple. Right lower leg: No edema. Left lower leg: No edema. Skin:     General: Skin is warm and dry. Neurological:      Mental Status: She is alert. Vitals:    12/07/22 0938   BP: 112/60   Site: Left Upper Arm   Position: Sitting   Cuff Size: Medium Adult   Pulse: (!) 105   Temp: 97.3 °F (36.3 °C)   TempSrc: Infrared   Weight: 118 lb (53.5 kg)   Height: 5' (1.524 m)     Body mass index is 23.05 kg/m². Wt Readings from Last 3 Encounters:   12/07/22 118 lb (53.5 kg)   12/02/22 118 lb (53.5 kg)   11/29/22 113 lb (51.3 kg)     BP Readings from Last 3 Encounters:   12/07/22 112/60   11/29/22 122/80   09/07/22 98/62          No results found for this visit on 12/07/22.   The ASCVD Risk score (Hair FITCH, et al., 2019) failed to calculate for the following reasons:     The 2019 ASCVD risk score is only valid for ages 36 to 1439 [facility] Outpatient Visit on 11/29/2022   Component Date Value    WBC 11/29/2022 8.9     RBC 11/29/2022 5.22     Hemoglobin 11/29/2022 14.9     Hematocrit 11/29/2022 46.1     MCV 11/29/2022 88.3     MCH 11/29/2022 28.5     MCHC 11/29/2022 32.3     RDW 11/29/2022 11.1 (A)     Platelets 46/40/0478 311     MPV 11/29/2022 10.2     Differential Type 11/29/2022 AUTOMATED DIFFERENTIAL     Segs Relative 11/29/2022 66.3 (A)     Lymphocytes % 11/29/2022 28.2     Monocytes % 11/29/2022 4.6 (A)     Eosinophils % 11/29/2022 0.1     Basophils % 11/29/2022 0.6     Segs Absolute 11/29/2022 5.9     Lymphocytes Absolute 11/29/2022 2.5     Monocytes Absolute 11/29/2022 0.4     Eosinophils Absolute 11/29/2022 0.0     Basophils Absolute 11/29/2022 0.1     Nucleated RBC % 11/29/2022 0.0     Total Nucleated RBC 11/29/2022 0.0     Total Immature Neutrophil 11/29/2022 0.02     Immature Neutrophil % 11/29/2022 0.2     Sodium 11/29/2022 139     Potassium 11/29/2022 4.0     Chloride 11/29/2022 100     CO2 11/29/2022 25     BUN 11/29/2022 8     Creatinine 11/29/2022 0.5 (A)     Est, Glom Filt Rate 11/29/2022 >60     Glucose 11/29/2022 334 (A)     Calcium 11/29/2022 9.7     Albumin 11/29/2022 4.5     Total Protein 11/29/2022 7.6     Total Bilirubin 11/29/2022 0.6     ALT 11/29/2022 <5 (A)     AST 11/29/2022 7 (A)     Alkaline Phosphatase 11/29/2022 94     Anion Gap 11/29/2022 14     Triglycerides 11/29/2022 139     Cholesterol 11/29/2022 195     HDL 11/29/2022 41     LDL Calculated 11/29/2022 126 (A)     Lipase 11/29/2022 12 (A)     Hemoglobin A1C 11/29/2022 9.2 (A)     eAG 11/29/2022 217    Office Visit on 11/29/2022   Component Date Value    Color, UA 11/29/2022 yellow     Clarity, UA 11/29/2022 clear     Glucose, UA POC 11/29/2022 500     Bilirubin, UA 11/29/2022 neg     Ketones, UA 11/29/2022 80     Spec Grav, UA 11/29/2022 1.020     Blood, UA POC 11/29/2022 trace     pH, UA 11/29/2022 5.5     Protein, UA POC 11/29/2022 trace     Urobilinogen, UA 11/29/2022 0.2     Leukocytes, UA 11/29/2022 neg     Nitrite, UA 11/29/2022 neg     SARS-CoV-2 11/29/2022 Not Detected     Microalbumin, Random Uri* 11/29/2022 5.90 (A)     Creatinine, Ur 11/29/2022 108.5     Microalbumin Creatinine * 11/29/2022 54.4 (A)            Assessment:       Diagnosis Orders   1. 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 Type 2 diabetes mellitus with hyperosmolarity, uncontrolled ([address]        3. Intellectual disability        4. Eye movement dissociation        5. Swollen eyelid, left  ciprofloxacin (CILOXAN) 0.3 % ophthalmic solution      6. Type 1 diabetes mellitus with diabetic polyneuropathy (HCC)  insulin aspart protamine-insulin aspart (NOVOLOG 70/30) (70-30) 100 UNIT/ML injection    Continuous Blood Gluc Sensor (FREESTYLE LOU 2 SENSOR) MISC    Continuous Blood Gluc  (FREESTYLE LOU 2 READER) KELVIN      7. Type 1 diabetes mellitus with stage 1 chronic kidney disease (HCC)  insulin aspart protamine-insulin aspart (NOVOLOG 70/30) (70-30) 100 UNIT/ML injection    Continuous Blood Gluc Sensor (FREESTYLE LOU 2 SENSOR) MISC    Continuous Blood Gluc  (FREESTYLE LOU 2 READER) KELVIN              Plan:      Diabetes uncontrolled but I think this is going to be the best we can get it controlled because of patient's intellectual disability. Continue with the insulin 35 units in the morning and 18 units in the evening. Adding on ACE inhibitor in light of the microalbuminuria. She knows to get a BMP done and 2 to 3 weeks. I have movement is discordant. She sees optometrist or ophthalmologist in 2 days. We will have them assess. Return in about 3 months (around 3/7/2023) for DM , Labs prior.

## 2022-12-08 PROBLEM — E11.00 TYPE 2 DIABETES MELLITUS WITH HYPEROSMOLARITY, UNCONTROLLED (HCC): Status: ACTIVE | Noted: 2022-12-08

## 2022-12-08 PROBLEM — E11.65 TYPE 2 DIABETES MELLITUS WITH HYPEROSMOLARITY, UNCONTROLLED (HCC): Status: ACTIVE | Noted: 2022-12-08

## 2022-12-08 RX ORDER — FLASH GLUCOSE SCANNING READER
EACH MISCELLANEOUS
Qty: 1 EACH | Refills: 11 | Status: SHIPPED | OUTPATIENT
Start: 2022-12-08

## 2022-12-08 RX ORDER — FLASH GLUCOSE SENSOR
KIT MISCELLANEOUS
Qty: 2 EACH | Refills: 11 | Status: SHIPPED | OUTPATIENT
Start: 2022-12-08

## 2022-12-13 ENCOUNTER — TELEPHONE (OUTPATIENT)
Dept: FAMILY MEDICINE CLINIC | Age: 27
End: 2022-12-13

## 2022-12-13 NOTE — TELEPHONE ENCOUNTER
----- Message from Janel Chiu sent at 12/13/2022  9:47 AM EST -----  Subject: Medication Problem    Medication: insulin aspart protamine-insulin aspart (NOVOLOG 70/30)   (70-30) 100 UNIT/ML injection  Dosage: Twice per day  Ordering Provider: Ananda Zuleta    Question/Problem: She picked up at [facility] yesterday and it was pens and   she needs vial instead. Please reorder with the bottle not the pen.       Pharmacy: [facility] [phone] [facility], [address] [place] [address] [address] [phone]    ---------------------------------------------------------------------------  --------------  Tj ACEVEDO  [phone]; OK to leave message on voicemail  ---------------------------------------------------------------------------  --------------    SCRIPT ANSWERS  Relationship to Patient: Self

## 2022-12-16 RX ORDER — INSULIN LISPRO 100 [IU]/ML
INJECTION, SOLUTION INTRAVENOUS; SUBCUTANEOUS
Qty: 10 ML | OUTPATIENT
Start: 2022-12-16

## 2022-12-19 DIAGNOSIS — N18.1 TYPE 1 DIABETES MELLITUS WITH STAGE 1 CHRONIC KIDNEY DISEASE (HCC): ICD-10-CM

## 2022-12-19 DIAGNOSIS — E10.42 TYPE 1 DIABETES MELLITUS WITH DIABETIC POLYNEUROPATHY (HCC): ICD-10-CM

## 2022-12-19 DIAGNOSIS — E10.22 TYPE 1 DIABETES MELLITUS WITH STAGE 1 CHRONIC KIDNEY DISEASE (HCC): ICD-10-CM

## 2022-12-19 NOTE — TELEPHONE ENCOUNTER
Patient called and stated that you sent the pens instead of the vial of the Humalog. She stated the pharmacy stated she has pens waiting for  but she wants the vial instead. [facility].

## 2022-12-28 RX ORDER — INSULIN LISPRO 100 [IU]/ML
INJECTION, SOLUTION INTRAVENOUS; SUBCUTANEOUS
Qty: 10 ML | OUTPATIENT
Start: 2022-12-28

## 2022-12-30 ENCOUNTER — TELEPHONE (OUTPATIENT)
Dept: FAMILY MEDICINE CLINIC | Age: 27
End: 2022-12-30

## 2023-01-03 NOTE — TELEPHONE ENCOUNTER
Per med list, has plenty of syringes    Her insulin should be OK to use. I cant remember why it was switched, but could be for insurance reasons.

## 2023-01-04 RX ORDER — INSULIN LISPRO 100 [IU]/ML
INJECTION, SOLUTION INTRAVENOUS; SUBCUTANEOUS
Qty: 10 ML | OUTPATIENT
Start: 2023-01-04

## 2023-01-06 PROBLEM — R22.43 LOCALIZED SWELLING OF BOTH LOWER LEGS: Status: ACTIVE | Noted: 2023-01-06

## 2023-02-17 ENCOUNTER — TELEPHONE (OUTPATIENT)
Dept: FAMILY MEDICINE CLINIC | Age: 28
End: 2023-02-17

## 2023-02-17 NOTE — TELEPHONE ENCOUNTER
Patient called requesting syringes and Novolin the vial bottle not the pens    I called the [facility] and they confirmed patient has refills available and will go ahead a fill for patient.

## 2023-02-17 NOTE — TELEPHONE ENCOUNTER
----- Message from Natasha Eduardo sent at 2/16/2023  1:28 PM EST -----  Subject: Refill Request    QUESTIONS  Name of Medication? insulin aspart protamine-insulin aspart (NOVOLOG   70/30) (70-30) 100 UNIT/ML injection  Patient-reported dosage and instructions? twice daily  How many days do you have left? 0  Preferred Pharmacy? [facility] [phone]  Pharmacy phone number (if available)?   ---------------------------------------------------------------------------  --------------  CALL BACK INFO  What is the best way for the office to contact you? OK to leave message on   voicemail  Preferred Call Back Phone Number? [phone]  ---------------------------------------------------------------------------  --------------  SCRIPT ANSWERS  Relationship to Patient?  Self

## 2023-03-20 ENCOUNTER — HOSPITAL ENCOUNTER (OUTPATIENT)
Age: 28
Discharge: HOME | End: 2023-03-20
Payer: COMMERCIAL

## 2023-03-20 DIAGNOSIS — R10.84 DIFFUSE ABDOMINAL PAIN: ICD-10-CM

## 2023-03-20 DIAGNOSIS — E10.29 TYPE 1 DIABETES MELLITUS WITH MICROALBUMINURIA (HCC): ICD-10-CM

## 2023-03-20 DIAGNOSIS — R10.30 LOWER ABDOMINAL PAIN: ICD-10-CM

## 2023-03-20 DIAGNOSIS — E10.42 TYPE 1 DIABETES MELLITUS WITH DIABETIC POLYNEUROPATHY (HCC): ICD-10-CM

## 2023-03-20 DIAGNOSIS — R80.9 TYPE 1 DIABETES MELLITUS WITH MICROALBUMINURIA (HCC): ICD-10-CM

## 2023-03-20 PROBLEM — E11.00 TYPE 2 DIABETES MELLITUS WITH HYPEROSMOLARITY, UNCONTROLLED (HCC): Status: RESOLVED | Noted: 2022-12-08 | Resolved: 2023-03-20

## 2023-03-20 PROBLEM — E11.65 TYPE 2 DIABETES MELLITUS WITH HYPEROSMOLARITY, UNCONTROLLED (HCC): Status: RESOLVED | Noted: 2022-12-08 | Resolved: 2023-03-20

## 2023-03-20 LAB
ANION GAP SERPL CALCULATED.3IONS-SCNC: 11 MMOL/L (ref 4–16)
BUN SERPL-MCNC: 5 MG/DL (ref 6–23)
CALCIUM SERPL-MCNC: 8 MG/DL (ref 8.3–10.6)
CHLORIDE BLD-SCNC: 105 MMOL/L (ref 99–110)
CO2: 25 MMOL/L (ref 21–32)
CREAT SERPL-MCNC: 0.4 MG/DL (ref 0.6–1.1)
ESTIMATED AVERAGE GLUCOSE: 192 MG/DL
GFR SERPL CREATININE-BSD FRML MDRD: >60 ML/MIN/1.73M2
GLUCOSE SERPL-MCNC: 137 MG/DL (ref 70–99)
HBA1C MFR BLD: 8.3 % (ref 4.2–6.3)
LIPASE: 14 IU/L (ref 13–60)
POTASSIUM SERPL-SCNC: 3.6 MMOL/L (ref 3.5–5.1)
SODIUM BLD-SCNC: 141 MMOL/L (ref 135–145)

## 2023-03-20 PROCEDURE — 83690 ASSAY OF LIPASE: CPT

## 2023-03-20 PROCEDURE — 80048 BASIC METABOLIC PNL TOTAL CA: CPT

## 2023-03-20 PROCEDURE — 83036 HEMOGLOBIN GLYCOSYLATED A1C: CPT

## 2023-03-20 PROCEDURE — 36415 COLL VENOUS BLD VENIPUNCTURE: CPT

## 2023-03-20 NOTE — PROGRESS NOTES
Patient ID: Estela Perales 1995    . Chief Complaint   Patient presents with    Diabetes    Cough     Cough Started Sunday taking OTC cough medicine           HPI    Type 1 diabetes:  She has random blood sugar readings. She has sugars that range from   She does not know how to use her [employer] luiz tia for reports. She cannot provide me with glucose trends. She goes low everyday, but then says it's not daily. She is taking 35 units in the morning and 18 units in the evening of 70/30 NovoLog. Cough: Started 3 days ago. No fever or chills or sweats. Mass on buttocks: Grandmother told her there was a mass on her buttock this morning. Wants to get checked out. Patient Active Problem List   Diagnosis    Type 1 diabetes mellitus with diabetic polyneuropathy (HCC)    DM (diabetes mellitus) ([facility])    Chronic kidney disease (CKD) stage G1/A1, glomerular filtration rate (GFR) equal to or greater than 90 mL/min/1.73 square meter and albuminuria creatinine ratio less than 30 mg/g    Intellectual disability    Illiterate    Localized swelling of both lower legs       No past surgical history on file. Family History   Problem Relation Age of Onset    Diabetes Mother     High Blood Pressure Mother     Kidney Disease Mother     Vision Loss Mother     Diabetes Type 1  Father     Diabetes Type 1  Brother        Current Outpatient Medications on File Prior to Visit   Medication Sig Dispense Refill    insulin aspart protamine-insulin aspart (NOVOLOG 70/30) (70-30) 100 UNIT/ML injection Inject 35 Units into the skin daily (with breakfast) AND 18 Units daily (before dinner). Dispense vials.  15.9 mL 11    Continuous Blood Gluc Sensor (FREESTYLE TIA 2 SENSOR) MISC Use 6 times per day 2 each 11    Continuous Blood Gluc  (FREESTYLE TIA 2 READER) KELVIN Check 6 times per day 1 each 11    lisinopril (PRINIVIL;ZESTRIL) 5 MG tablet Take 0.5 tablets by mouth daily 45 tablet 0    ibuprofen

## 2023-03-21 ENCOUNTER — OFFICE VISIT (OUTPATIENT)
Dept: FAMILY MEDICINE CLINIC | Age: 28
End: 2023-03-21
Payer: COMMERCIAL

## 2023-03-21 ENCOUNTER — TELEPHONE (OUTPATIENT)
Dept: FAMILY MEDICINE CLINIC | Age: 28
End: 2023-03-21

## 2023-03-21 VITALS
BODY MASS INDEX: 26.11 KG/M2 | HEIGHT: 60 IN | WEIGHT: 133 LBS | TEMPERATURE: 98.1 F | DIASTOLIC BLOOD PRESSURE: 80 MMHG | SYSTOLIC BLOOD PRESSURE: 122 MMHG | HEART RATE: 88 BPM

## 2023-03-21 DIAGNOSIS — J06.9 VIRAL UPPER RESPIRATORY TRACT INFECTION: ICD-10-CM

## 2023-03-21 DIAGNOSIS — E10.42 TYPE 1 DIABETES MELLITUS WITH DIABETIC POLYNEUROPATHY (HCC): Primary | ICD-10-CM

## 2023-03-21 LAB
CREAT UR-MCNC: 175.5 MG/DL (ref 28–259)
MICROALBUMIN UR DL<=1MG/L-MCNC: 7 MG/DL
MICROALBUMIN/CREAT UR: 39.9 MG/G (ref 0–30)

## 2023-03-21 PROCEDURE — G8482 FLU IMMUNIZE ORDER/ADMIN: HCPCS | Performed by: FAMILY MEDICINE

## 2023-03-21 PROCEDURE — G8427 DOCREV CUR MEDS BY ELIG CLIN: HCPCS | Performed by: FAMILY MEDICINE

## 2023-03-21 PROCEDURE — 1036F TOBACCO NON-USER: CPT | Performed by: FAMILY MEDICINE

## 2023-03-21 PROCEDURE — 2022F DILAT RTA XM EVC RTNOPTHY: CPT | Performed by: FAMILY MEDICINE

## 2023-03-21 PROCEDURE — G8419 CALC BMI OUT NRM PARAM NOF/U: HCPCS | Performed by: FAMILY MEDICINE

## 2023-03-21 PROCEDURE — 3052F HG A1C>EQUAL 8.0%<EQUAL 9.0%: CPT | Performed by: FAMILY MEDICINE

## 2023-03-21 PROCEDURE — 99214 OFFICE O/P EST MOD 30 MIN: CPT | Performed by: FAMILY MEDICINE

## 2023-03-21 ASSESSMENT — PATIENT HEALTH QUESTIONNAIRE - PHQ9
1. LITTLE INTEREST OR PLEASURE IN DOING THINGS: 0
2. FEELING DOWN, DEPRESSED OR HOPELESS: 0
SUM OF ALL RESPONSES TO PHQ QUESTIONS 1-9: 0
SUM OF ALL RESPONSES TO PHQ9 QUESTIONS 1 & 2: 0
SUM OF ALL RESPONSES TO PHQ QUESTIONS 1-9: 0

## 2023-03-21 NOTE — TELEPHONE ENCOUNTER
Spoke to [employer] [facility] all insulins canceled except 7/0. Patient has been using both pens and vials of 70/30. Patient has refills of both 70/30 at the pharmacy.   Most recent refill was 70/30 vial.

## 2023-03-21 NOTE — TELEPHONE ENCOUNTER
----- Message from Kath Alonzo MD sent at 3/21/2023  8:33 AM EDT -----  Please check with pharmacy.   Have them cancel all insulin scripts except for the 70/30 (please verify if she has been using vials or pens for the 70/30)

## 2023-03-21 NOTE — Clinical Note
Please check with pharmacy.   Have them cancel all insulin scripts except for the 70/30 (please verify if she has been using vials or pens for the 70/30)

## 2023-03-22 DIAGNOSIS — U07.1 SARS-COV-2 POSITIVE: Primary | ICD-10-CM

## 2023-03-22 LAB — SARS-COV-2 N GENE RESP QL NAA+PROBE: DETECTED

## 2023-03-22 NOTE — TELEPHONE ENCOUNTER
Patient has 10 refills of 70/30 vials  and 1 refill of 70/30 pens.     Patient has been notified she has refills at the pharmacy

## 2023-03-23 LAB — DIABETIC RETINOPATHY: NEGATIVE

## 2023-04-18 ENCOUNTER — TELEPHONE (OUTPATIENT)
Dept: FAMILY MEDICINE CLINIC | Age: 28
End: 2023-04-18

## 2023-04-18 NOTE — TELEPHONE ENCOUNTER
----- Message from Leslykrisjimbo Ramirez sent at 4/18/2023  8:32 AM EDT -----  Subject: Refill Request    QUESTIONS  Name of Medication? INSULIN SYRINGE .5CC/29G ([employer] INS SYR .5CC/29G) 29G   X 1/2\" 0.5 ML MISC  Patient-reported dosage and instructions? .5cc/29g, once a day  How many days do you have left? 3  Preferred Pharmacy? [facility] [phone]  Pharmacy phone number (if available)?   ---------------------------------------------------------------------------  --------------  CALL BACK INFO  What is the best way for the office to contact you? OK to leave message on   voicemail  Preferred Call Back Phone Number? [phone]  ---------------------------------------------------------------------------  --------------  SCRIPT ANSWERS  Relationship to Patient?  Self

## 2023-04-21 ENCOUNTER — TELEPHONE (OUTPATIENT)
Dept: FAMILY MEDICINE CLINIC | Age: 28
End: 2023-04-21

## 2023-04-21 NOTE — TELEPHONE ENCOUNTER
INSULIN SYRINGE .5CC/29G ([employer] INS SYR .5CC/29G) 29G X 1/2\" 0.5 ML MISC 200 each 11 11/3/2022     Sig - Route: 1 each by Does not apply route 5 times daily - Does not apply    Sent to pharmacy as:  Insulin Syringe 29G X 1/2\" 0.5 ML ([employer] INS SYR .5CC/29G)    E-Prescribing Status: Receipt confirmed by pharmacy (11/3/2022 11:27 AM EDT)    Left message patient has refills at pharmacy

## 2023-04-21 NOTE — TELEPHONE ENCOUNTER
----- Message from Clarke Armijo sent at 4/18/2023  8:32 AM EDT -----  Subject: Refill Request    QUESTIONS  Name of Medication? INSULIN SYRINGE .5CC/29G ([employer] INS SYR .5CC/29G) 29G   X 1/2\" 0.5 ML MISC  Patient-reported dosage and instructions? .5cc/29g, once a day  How many days do you have left? 3  Preferred Pharmacy? [facility] [phone]  Pharmacy phone number (if available)?   ---------------------------------------------------------------------------  --------------  CALL BACK INFO  What is the best way for the office to contact you? OK to leave message on   voicemail  Preferred Call Back Phone Number? [phone]  ---------------------------------------------------------------------------  --------------  SCRIPT ANSWERS  Relationship to Patient?  Self

## 2023-06-21 ENCOUNTER — OFFICE VISIT (OUTPATIENT)
Dept: FAMILY MEDICINE CLINIC | Age: 28
End: 2023-06-21
Payer: COMMERCIAL

## 2023-06-21 VITALS
WEIGHT: 142.6 LBS | SYSTOLIC BLOOD PRESSURE: 120 MMHG | OXYGEN SATURATION: 98 % | HEART RATE: 105 BPM | HEIGHT: 60 IN | DIASTOLIC BLOOD PRESSURE: 64 MMHG | BODY MASS INDEX: 28 KG/M2

## 2023-06-21 DIAGNOSIS — F79 INTELLECTUAL DISABILITY: ICD-10-CM

## 2023-06-21 DIAGNOSIS — Z23 NEED FOR TETANUS BOOSTER: ICD-10-CM

## 2023-06-21 DIAGNOSIS — E10.42 TYPE 1 DIABETES MELLITUS WITH DIABETIC POLYNEUROPATHY (HCC): Primary | ICD-10-CM

## 2023-06-21 DIAGNOSIS — E10.29 TYPE 1 DIABETES MELLITUS WITH MICROALBUMINURIA (HCC): ICD-10-CM

## 2023-06-21 DIAGNOSIS — Z55.0 ILLITERATE: ICD-10-CM

## 2023-06-21 DIAGNOSIS — R60.0 EDEMA OF BOTH LEGS: ICD-10-CM

## 2023-06-21 DIAGNOSIS — R80.9 TYPE 1 DIABETES MELLITUS WITH MICROALBUMINURIA (HCC): ICD-10-CM

## 2023-06-21 LAB — HBA1C MFR BLD: 7.6 %

## 2023-06-21 PROCEDURE — G8427 DOCREV CUR MEDS BY ELIG CLIN: HCPCS | Performed by: FAMILY MEDICINE

## 2023-06-21 PROCEDURE — 90715 TDAP VACCINE 7 YRS/> IM: CPT | Performed by: FAMILY MEDICINE

## 2023-06-21 PROCEDURE — 3051F HG A1C>EQUAL 7.0%<8.0%: CPT | Performed by: FAMILY MEDICINE

## 2023-06-21 PROCEDURE — 1036F TOBACCO NON-USER: CPT | Performed by: FAMILY MEDICINE

## 2023-06-21 PROCEDURE — 2022F DILAT RTA XM EVC RTNOPTHY: CPT | Performed by: FAMILY MEDICINE

## 2023-06-21 PROCEDURE — 90471 IMMUNIZATION ADMIN: CPT | Performed by: FAMILY MEDICINE

## 2023-06-21 PROCEDURE — 83037 HB GLYCOSYLATED A1C HOME DEV: CPT | Performed by: FAMILY MEDICINE

## 2023-06-21 PROCEDURE — 99214 OFFICE O/P EST MOD 30 MIN: CPT | Performed by: FAMILY MEDICINE

## 2023-06-21 PROCEDURE — G8419 CALC BMI OUT NRM PARAM NOF/U: HCPCS | Performed by: FAMILY MEDICINE

## 2023-06-21 RX ORDER — LISINOPRIL 5 MG/1
2.5 TABLET ORAL DAILY
Qty: 45 TABLET | Refills: 3 | Status: SHIPPED | OUTPATIENT
Start: 2023-06-21

## 2023-06-21 RX ORDER — LANCETS 30 GAUGE
1 EACH MISCELLANEOUS 3 TIMES DAILY
Qty: 300 EACH | Refills: 2 | Status: SHIPPED | OUTPATIENT
Start: 2023-06-21

## 2023-06-21 RX ORDER — FUROSEMIDE 20 MG/1
20 TABLET ORAL DAILY PRN
Qty: 30 TABLET | Refills: 2 | Status: SHIPPED | OUTPATIENT
Start: 2023-06-21

## 2023-06-21 SDOH — EDUCATIONAL SECURITY - EDUCATION ATTAINMENT: ILITERACY AND LOW LEVEL LITERACY: Z55.0

## 2023-06-21 NOTE — PROGRESS NOTES
Patient ID: Estela Perales 1995    . Chief Complaint   Patient presents with    Diabetes     Likes and prefers the vials instead of pens          HPI    Type 1 diabetes:  She has random blood sugar readings. She has sugars that range from . She does not know how to use her fress gamaliele tia for reports. She cannot provide me with glucose trends. She goes \"low\"  about once per month. She drinks juice or pop to bring up her sugars. She is taking 35 units in the morning and 18 units in the evening of 70/30 NovoLog. On her own, she has been taking extra 70/30 insulin if her sugar is over 300. Her sensors don't stay on. She sometimes buys extra sensors. Leg edema: stands all day at work. As she stands, her legs swell. It's better in the mornings. Ongoing for a while        Patient Active Problem List   Diagnosis    Type 1 diabetes mellitus with diabetic polyneuropathy ([facility]    DM (diabetes mellitus) ([facility]    Chronic kidney disease (CKD) stage G1/A1, glomerular filtration rate (GFR) equal to or greater than 90 mL/min/1.73 square meter and albuminuria creatinine ratio less than 30 mg/g    Intellectual disability    Illiterate    Localized swelling of both lower legs       No past surgical history on file. Family History   Problem Relation Age of Onset    Diabetes Mother     High Blood Pressure Mother     Kidney Disease Mother     Vision Loss Mother     Diabetes Type 1  Father     Diabetes Type 1  Brother        Current Outpatient Medications on File Prior to Visit   Medication Sig Dispense Refill    insulin aspart protamine-insulin aspart (NOVOLOG 70/30) (70-30) 100 UNIT/ML injection Inject 35 Units into the skin daily (with breakfast) AND 18 Units daily (before dinner). Dispense vials.  15.9 mL 11    Continuous Blood Gluc Sensor (FREESTYLE TIA 2 SENSOR) MISC Use 6 times per day 2 each 11    Continuous Blood Gluc  (FREESTYLE TIA 2 READER) KELVIN Check 6 times per day 1 each 11

## 2023-06-22 LAB
CREAT UR-MCNC: 278.7 MG/DL (ref 28–259)
MICROALBUMIN UR DL<=1MG/L-MCNC: 23 MG/DL
MICROALBUMIN/CREAT UR: 82.5 MG/G (ref 0–30)

## 2023-07-12 ENCOUNTER — OFFICE VISIT (OUTPATIENT)
Dept: FAMILY MEDICINE CLINIC | Age: 28
End: 2023-07-12
Payer: COMMERCIAL

## 2023-07-12 VITALS — WEIGHT: 136.6 LBS | BODY MASS INDEX: 26.82 KG/M2 | HEIGHT: 60 IN

## 2023-07-12 DIAGNOSIS — E10.42 TYPE 1 DIABETES MELLITUS WITH DIABETIC POLYNEUROPATHY (HCC): Primary | ICD-10-CM

## 2023-07-12 PROCEDURE — 99999 PR OFFICE/OUTPT VISIT,PROCEDURE ONLY: CPT

## 2023-07-12 PROCEDURE — G0108 DIAB MANAGE TRN  PER INDIV: HCPCS

## 2023-07-12 PROCEDURE — 95251 CONT GLUC MNTR ANALYSIS I&R: CPT

## 2023-07-12 SDOH — ECONOMIC STABILITY: INCOME INSECURITY: HOW HARD IS IT FOR YOU TO PAY FOR THE VERY BASICS LIKE FOOD, HOUSING, MEDICAL CARE, AND HEATING?: PATIENT DECLINED

## 2023-07-12 SDOH — ECONOMIC STABILITY: FOOD INSECURITY: WITHIN THE PAST 12 MONTHS, YOU WORRIED THAT YOUR FOOD WOULD RUN OUT BEFORE YOU GOT MONEY TO BUY MORE.: PATIENT DECLINED

## 2023-07-12 SDOH — ECONOMIC STABILITY: HOUSING INSECURITY
IN THE LAST 12 MONTHS, WAS THERE A TIME WHEN YOU DID NOT HAVE A STEADY PLACE TO SLEEP OR SLEPT IN A SHELTER (INCLUDING NOW)?: PATIENT REFUSED

## 2023-07-12 SDOH — ECONOMIC STABILITY: FOOD INSECURITY: WITHIN THE PAST 12 MONTHS, THE FOOD YOU BOUGHT JUST DIDN'T LAST AND YOU DIDN'T HAVE MONEY TO GET MORE.: PATIENT DECLINED

## 2023-07-18 NOTE — PROGRESS NOTES
largely due to nutrition choices including not having enough rapid acting insulin to cover her meals & irregularity in meal times & amount of carbs consumed with each meal.  Patient has been previously educated on this & was reviewed again today. - patient is encouraged to contact [facility] at [phone] with future mechanical failures of her sensors. - continue to follow with PCP for medication changes & general diabetes care. Re-evaluate as needed. I have personally reviewed the health record, including provider notes, laboratory data and current medications before making these care and education recommendations. The time spent in this effort is included in the total time.   Total minutes: 38

## 2023-08-30 ENCOUNTER — TELEPHONE (OUTPATIENT)
Dept: FAMILY MEDICINE CLINIC | Age: 28
End: 2023-08-30

## 2023-08-30 NOTE — TELEPHONE ENCOUNTER
Lm for patient to return call, please ask patient to call [phone] to schedule her appointment for the  diabetic education

## 2023-09-11 LAB — DIABETIC RETINOPATHY: POSITIVE

## 2023-09-25 ENCOUNTER — OFFICE VISIT (OUTPATIENT)
Dept: FAMILY MEDICINE CLINIC | Age: 28
End: 2023-09-25
Payer: COMMERCIAL

## 2023-09-25 VITALS
DIASTOLIC BLOOD PRESSURE: 62 MMHG | OXYGEN SATURATION: 98 % | HEART RATE: 96 BPM | WEIGHT: 141 LBS | SYSTOLIC BLOOD PRESSURE: 122 MMHG | BODY MASS INDEX: 27.54 KG/M2

## 2023-09-25 DIAGNOSIS — E10.3511 TYPE 1 DIABETES MELLITUS WITH PROLIFERATIVE RETINOPATHY OF RIGHT EYE AND MACULAR EDEMA (HCC): ICD-10-CM

## 2023-09-25 DIAGNOSIS — E10.3521: ICD-10-CM

## 2023-09-25 DIAGNOSIS — E10.3592: ICD-10-CM

## 2023-09-25 DIAGNOSIS — E10.3522: ICD-10-CM

## 2023-09-25 DIAGNOSIS — E10.42 TYPE 1 DIABETES MELLITUS WITH DIABETIC POLYNEUROPATHY (HCC): ICD-10-CM

## 2023-09-25 DIAGNOSIS — E10.65 UNCONTROLLED TYPE 1 DIABETES MELLITUS WITH HYPERGLYCEMIA (HCC): Primary | ICD-10-CM

## 2023-09-25 LAB — HBA1C MFR BLD: 8.6 %

## 2023-09-25 PROCEDURE — 83036 HEMOGLOBIN GLYCOSYLATED A1C: CPT

## 2023-09-25 PROCEDURE — 3052F HG A1C>EQUAL 8.0%<EQUAL 9.0%: CPT

## 2023-09-25 PROCEDURE — 95251 CONT GLUC MNTR ANALYSIS I&R: CPT

## 2023-09-25 PROCEDURE — 99215 OFFICE O/P EST HI 40 MIN: CPT

## 2023-09-25 NOTE — PROGRESS NOTES
Diabetes Mellitus Type I, Follow-Up Visit    CC: Patient for diabetes care including intensive insulin & CGM monitoring    HPI:  Estela Smith is a 25 y.o. female with a history of diabetes, dx at 6years old. 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 As an adult, she has struggled to regulate her glucose, she has seen Dr. Jeff Joy and most recently is managed by her PCP. Her diabetes control is complicated due to her intellectual disability, inability to comprehend written instructions and lack of support at home. Patient presents wearing an eye patch on her left eye. She states that Dr. Karlee Kimble advised her that she has retinopathy & she is scheduled for surgery on 10/12/23 in [place]. Current treatment includes: insulin. Current nutrition plan and level of physical activity includes: she works in [employer], she eats at home & fast food mostly, attempts at carb counting were previously unsucessful. Current weight trend is described as; there was no change in patient's weight. .  She has received diabetes education and/or dietitian consult previously. Patient is currently monitoring glucose at home. Glucose monitoring includes: continuous via CGM. Most recent A1C result is   Hemoglobin A1C   Date Value Ref Range Status   09/25/2023 8.6 % Final         PMH:   Encounter Diagnoses   Name Primary?     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

## 2023-09-27 ENCOUNTER — OFFICE VISIT (OUTPATIENT)
Dept: FAMILY MEDICINE CLINIC | Age: 28
End: 2023-09-27
Payer: COMMERCIAL

## 2023-09-27 VITALS
DIASTOLIC BLOOD PRESSURE: 76 MMHG | BODY MASS INDEX: 28.28 KG/M2 | WEIGHT: 144.8 LBS | HEART RATE: 98 BPM | SYSTOLIC BLOOD PRESSURE: 110 MMHG | OXYGEN SATURATION: 99 %

## 2023-09-27 DIAGNOSIS — E10.29 TYPE 1 DIABETES MELLITUS WITH MICROALBUMINURIA (HCC): ICD-10-CM

## 2023-09-27 DIAGNOSIS — Z11.59 NEED FOR HEPATITIS C SCREENING TEST: ICD-10-CM

## 2023-09-27 DIAGNOSIS — F79 INTELLECTUAL DISABILITY: ICD-10-CM

## 2023-09-27 DIAGNOSIS — N18.1 TYPE 1 DIABETES MELLITUS WITH STAGE 1 CHRONIC KIDNEY DISEASE (HCC): ICD-10-CM

## 2023-09-27 DIAGNOSIS — E10.22 TYPE 1 DIABETES MELLITUS WITH STAGE 1 CHRONIC KIDNEY DISEASE (HCC): ICD-10-CM

## 2023-09-27 DIAGNOSIS — E10.3521: Primary | ICD-10-CM

## 2023-09-27 DIAGNOSIS — R80.9 TYPE 1 DIABETES MELLITUS WITH MICROALBUMINURIA (HCC): ICD-10-CM

## 2023-09-27 DIAGNOSIS — Z23 NEEDS FLU SHOT: ICD-10-CM

## 2023-09-27 DIAGNOSIS — E10.42 TYPE 1 DIABETES MELLITUS WITH DIABETIC POLYNEUROPATHY (HCC): ICD-10-CM

## 2023-09-27 PROCEDURE — 1036F TOBACCO NON-USER: CPT | Performed by: FAMILY MEDICINE

## 2023-09-27 PROCEDURE — 90674 CCIIV4 VAC NO PRSV 0.5 ML IM: CPT | Performed by: FAMILY MEDICINE

## 2023-09-27 PROCEDURE — G8419 CALC BMI OUT NRM PARAM NOF/U: HCPCS | Performed by: FAMILY MEDICINE

## 2023-09-27 PROCEDURE — 99214 OFFICE O/P EST MOD 30 MIN: CPT | Performed by: FAMILY MEDICINE

## 2023-09-27 PROCEDURE — 36415 COLL VENOUS BLD VENIPUNCTURE: CPT | Performed by: FAMILY MEDICINE

## 2023-09-27 PROCEDURE — 3052F HG A1C>EQUAL 8.0%<EQUAL 9.0%: CPT | Performed by: FAMILY MEDICINE

## 2023-09-27 PROCEDURE — 90471 IMMUNIZATION ADMIN: CPT | Performed by: FAMILY MEDICINE

## 2023-09-27 PROCEDURE — 2022F DILAT RTA XM EVC RTNOPTHY: CPT | Performed by: FAMILY MEDICINE

## 2023-09-27 PROCEDURE — G8427 DOCREV CUR MEDS BY ELIG CLIN: HCPCS | Performed by: FAMILY MEDICINE

## 2023-09-27 RX ORDER — LISINOPRIL 5 MG/1
2.5 TABLET ORAL DAILY
Qty: 45 TABLET | Refills: 3 | Status: CANCELLED | OUTPATIENT
Start: 2023-09-27

## 2023-09-28 LAB
CREAT UR-MCNC: 272.7 MG/DL (ref 28–259)
MICROALBUMIN UR DL<=1MG/L-MCNC: 9.8 MG/DL
MICROALBUMIN/CREAT UR: 35.9 MG/G (ref 0–30)

## 2023-10-02 ASSESSMENT — ENCOUNTER SYMPTOMS
DIARRHEA: 0
NAUSEA: 0

## 2023-10-09 ENCOUNTER — OFFICE VISIT (OUTPATIENT)
Dept: FAMILY MEDICINE CLINIC | Age: 28
End: 2023-10-09
Payer: COMMERCIAL

## 2023-10-09 VITALS
DIASTOLIC BLOOD PRESSURE: 70 MMHG | SYSTOLIC BLOOD PRESSURE: 128 MMHG | OXYGEN SATURATION: 98 % | WEIGHT: 149.4 LBS | HEIGHT: 60 IN | HEART RATE: 96 BPM | BODY MASS INDEX: 29.33 KG/M2

## 2023-10-09 DIAGNOSIS — E10.29 TYPE 1 DIABETES MELLITUS WITH MICROALBUMINURIA (HCC): ICD-10-CM

## 2023-10-09 DIAGNOSIS — Z97.8 USES SELF-APPLIED CONTINUOUS GLUCOSE MONITORING DEVICE: ICD-10-CM

## 2023-10-09 DIAGNOSIS — E10.649 HYPOGLYCEMIA UNAWARENESS ASSOCIATED WITH TYPE 1 DIABETES MELLITUS (HCC): ICD-10-CM

## 2023-10-09 DIAGNOSIS — E10.42 TYPE 1 DIABETES MELLITUS WITH DIABETIC POLYNEUROPATHY (HCC): ICD-10-CM

## 2023-10-09 DIAGNOSIS — E10.3522: ICD-10-CM

## 2023-10-09 DIAGNOSIS — N18.1 TYPE 1 DIABETES MELLITUS WITH STAGE 1 CHRONIC KIDNEY DISEASE (HCC): Primary | ICD-10-CM

## 2023-10-09 DIAGNOSIS — R80.9 TYPE 1 DIABETES MELLITUS WITH MICROALBUMINURIA (HCC): ICD-10-CM

## 2023-10-09 DIAGNOSIS — E10.22 TYPE 1 DIABETES MELLITUS WITH STAGE 1 CHRONIC KIDNEY DISEASE (HCC): Primary | ICD-10-CM

## 2023-10-09 PROCEDURE — 3052F HG A1C>EQUAL 8.0%<EQUAL 9.0%: CPT

## 2023-10-09 PROCEDURE — 99214 OFFICE O/P EST MOD 30 MIN: CPT

## 2023-10-09 RX ORDER — LISINOPRIL 5 MG/1
5 TABLET ORAL DAILY
Qty: 90 TABLET | Refills: 0 | Status: SHIPPED | OUTPATIENT
Start: 2023-10-09

## 2023-10-09 RX ORDER — IBUPROFEN 200 MG
1 TABLET ORAL 2 TIMES DAILY
Qty: 200 EACH | Refills: 11 | Status: SHIPPED | OUTPATIENT
Start: 2023-10-09

## 2023-10-09 NOTE — PROGRESS NOTES
HDL   Date Value Ref Range Status   11/29/2022 41 >40 MG/DL Final       Glucose Trends:    Time in range () is not at target of 70%. Percent of variability at 54% indicates that glycemic control is not stable. Time spent in hypoglycemia range (<70)/hypoglycemic events is not at target. Hypoglycemia is occurring:  various times of day, but mostly overnight & especially concerning is that she is not always awakening without the CGM alerts. Time spent in hyperglycemia range (>180) is not at target. Hyperglycemia is occurring: in the afternoon in the evening , mostly occurring post-prandial or hypo-rebound        Preventative Care:  Care Gaps not addressed at this visit. Assessment/Plan:  1. Type 1 diabetes mellitus with stage 1 chronic kidney disease (HCC)  - Continuous Blood Gluc Sensor (FREESTYLE LOU 2 SENSOR) MISC; Use as directed for continuous glucose monitoring. Dispense: 2 each; Refill: 11  - lisinopril (PRINIVIL;ZESTRIL) 5 MG tablet; Take 1 tablet by mouth daily  Dispense: 90 tablet; Refill: 0    2. Type 1 diabetes mellitus with diabetic polyneuropathy (HCC)  - Continuous Blood Gluc Sensor (FREESTYLE LOU 2 SENSOR) MISC; Use as directed for continuous glucose monitoring. Dispense: 2 each; Refill: 11  - INSULIN SYRINGE .5CC/29G ([employer] INS SYR .5CC/29G) 29G X 1/2\" 0.5 ML MISC; 1 each by Does not apply route 2 times daily  Dispense: 200 each; Refill: 11  - considering insulin pump therapy for better glucose regulation    3. Uses self-applied continuous glucose monitoring device  - continue CGM monitoring    4. 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 Type 1 diabetes mellitus with microalbuminuria (HCC)  - lisinopril (PRINIVIL;ZESTRIL) 5 MG tablet; Take 1 tablet by mouth daily  Dispense: 90 tablet;  Refill: 0  - continue to monitor

## 2023-10-17 ASSESSMENT — ENCOUNTER SYMPTOMS
DIARRHEA: 0
NAUSEA: 0

## 2023-11-06 ENCOUNTER — TELEPHONE (OUTPATIENT)
Dept: FAMILY MEDICINE CLINIC | Age: 28
End: 2023-11-06

## 2023-11-06 DIAGNOSIS — E10.42 TYPE 1 DIABETES MELLITUS WITH DIABETIC POLYNEUROPATHY (HCC): ICD-10-CM

## 2023-11-06 DIAGNOSIS — E10.22 TYPE 1 DIABETES MELLITUS WITH STAGE 1 CHRONIC KIDNEY DISEASE (HCC): ICD-10-CM

## 2023-11-06 DIAGNOSIS — N18.1 TYPE 1 DIABETES MELLITUS WITH STAGE 1 CHRONIC KIDNEY DISEASE (HCC): ICD-10-CM

## 2023-11-06 NOTE — TELEPHONE ENCOUNTER
Patient called & reported that she was almost out of 70/30 insulin, she uses [facility] on [address] [facility] prefers vials.

## 2023-12-04 ENCOUNTER — HOSPITAL ENCOUNTER (OUTPATIENT)
Age: 28
Setting detail: OBSERVATION
Discharge: HOME | End: 2023-12-06
Attending: EMERGENCY MEDICINE | Admitting: INTERNAL MEDICINE
Payer: COMMERCIAL

## 2023-12-04 ENCOUNTER — APPOINTMENT (OUTPATIENT)
Dept: GENERAL RADIOLOGY | Age: 28
End: 2023-12-04
Payer: COMMERCIAL

## 2023-12-04 DIAGNOSIS — T68.XXXA HYPOTHERMIA, INITIAL ENCOUNTER: ICD-10-CM

## 2023-12-04 DIAGNOSIS — E16.2 HYPOGLYCEMIA: Primary | ICD-10-CM

## 2023-12-04 LAB
ALBUMIN SERPL-MCNC: 4.1 GM/DL (ref 3.4–5)
ALP BLD-CCNC: 78 IU/L (ref 40–129)
ALT SERPL-CCNC: 6 U/L (ref 10–40)
ANION GAP SERPL CALCULATED.3IONS-SCNC: 12 MMOL/L (ref 4–16)
AST SERPL-CCNC: 9 IU/L (ref 15–37)
BASOPHILS ABSOLUTE: 0 K/CU MM
BASOPHILS RELATIVE PERCENT: 0.2 % (ref 0–1)
BILIRUB SERPL-MCNC: 0.1 MG/DL (ref 0–1)
BUN SERPL-MCNC: 12 MG/DL (ref 6–23)
CALCIUM SERPL-MCNC: 8.9 MG/DL (ref 8.3–10.6)
CHLORIDE BLD-SCNC: 107 MMOL/L (ref 99–110)
CHP ED QC CHECK: 111
CHP ED QC CHECK: YES
CO2: 24 MMOL/L (ref 21–32)
CREAT SERPL-MCNC: 0.6 MG/DL (ref 0.6–1.1)
DIFFERENTIAL TYPE: ABNORMAL
EOSINOPHILS ABSOLUTE: 0.1 K/CU MM
EOSINOPHILS RELATIVE PERCENT: 1.1 % (ref 0–3)
GFR SERPL CREATININE-BSD FRML MDRD: >60 ML/MIN/1.73M2
GLUCOSE BLD-MCNC: 111 MG/DL (ref 70–99)
GLUCOSE BLD-MCNC: 92 MG/DL
GLUCOSE BLD-MCNC: 92 MG/DL (ref 70–99)
GLUCOSE SERPL-MCNC: 80 MG/DL (ref 70–99)
HCG QUALITATIVE: NEGATIVE
HCT VFR BLD CALC: 41.9 % (ref 37–47)
HEMOGLOBIN: 13.1 GM/DL (ref 12.5–16)
IMMATURE NEUTROPHIL %: 0.3 % (ref 0–0.43)
LACTIC ACID, SEPSIS: 2.3 MMOL/L (ref 0.4–2)
LIPASE: 16 IU/L (ref 13–60)
LYMPHOCYTES ABSOLUTE: 3.2 K/CU MM
LYMPHOCYTES RELATIVE PERCENT: 25.4 % (ref 24–44)
MCH RBC QN AUTO: 28.5 PG (ref 27–31)
MCHC RBC AUTO-ENTMCNC: 31.3 % (ref 32–36)
MCV RBC AUTO: 91.1 FL (ref 78–100)
MONOCYTES ABSOLUTE: 0.7 K/CU MM
MONOCYTES RELATIVE PERCENT: 5.6 % (ref 0–4)
NUCLEATED RBC %: 0 %
PDW BLD-RTO: 11.9 % (ref 11.7–14.9)
PLATELET # BLD: 259 K/CU MM (ref 140–440)
PMV BLD AUTO: 10.1 FL (ref 7.5–11.1)
POTASSIUM SERPL-SCNC: 3.3 MMOL/L (ref 3.5–5.1)
RBC # BLD: 4.6 M/CU MM (ref 4.2–5.4)
SEGMENTED NEUTROPHILS ABSOLUTE COUNT: 8.4 K/CU MM
SEGMENTED NEUTROPHILS RELATIVE PERCENT: 67.4 % (ref 36–66)
SODIUM BLD-SCNC: 143 MMOL/L (ref 135–145)
TOTAL IMMATURE NEUTOROPHIL: 0.04 K/CU MM
TOTAL NUCLEATED RBC: 0 K/CU MM
TOTAL PROTEIN: 7.4 GM/DL (ref 6.4–8.2)
WBC # BLD: 12.4 K/CU MM (ref 4–10.5)

## 2023-12-04 PROCEDURE — 71045 X-RAY EXAM CHEST 1 VIEW: CPT

## 2023-12-04 PROCEDURE — 96361 HYDRATE IV INFUSION ADD-ON: CPT

## 2023-12-04 PROCEDURE — 99285 EMERGENCY DEPT VISIT HI MDM: CPT

## 2023-12-04 PROCEDURE — 2500000003 HC RX 250 WO HCPCS: Performed by: PHYSICIAN ASSISTANT

## 2023-12-04 PROCEDURE — 83605 ASSAY OF LACTIC ACID: CPT

## 2023-12-04 PROCEDURE — 80053 COMPREHEN METABOLIC PANEL: CPT

## 2023-12-04 PROCEDURE — 93005 ELECTROCARDIOGRAM TRACING: CPT | Performed by: PHYSICIAN ASSISTANT

## 2023-12-04 PROCEDURE — 96375 TX/PRO/DX INJ NEW DRUG ADDON: CPT

## 2023-12-04 PROCEDURE — 85025 COMPLETE CBC W/AUTO DIFF WBC: CPT

## 2023-12-04 PROCEDURE — 84703 CHORIONIC GONADOTROPIN ASSAY: CPT

## 2023-12-04 PROCEDURE — 82962 GLUCOSE BLOOD TEST: CPT

## 2023-12-04 PROCEDURE — 83690 ASSAY OF LIPASE: CPT

## 2023-12-04 PROCEDURE — 87040 BLOOD CULTURE FOR BACTERIA: CPT

## 2023-12-04 PROCEDURE — 2580000003 HC RX 258: Performed by: PHYSICIAN ASSISTANT

## 2023-12-04 RX ORDER — SODIUM CHLORIDE 9 MG/ML
INJECTION, SOLUTION INTRAVENOUS CONTINUOUS
Status: DISCONTINUED | OUTPATIENT
Start: 2023-12-04 | End: 2023-12-06 | Stop reason: HOSPADM

## 2023-12-04 RX ORDER — 0.9 % SODIUM CHLORIDE 0.9 %
1000 INTRAVENOUS SOLUTION INTRAVENOUS ONCE
Status: COMPLETED | OUTPATIENT
Start: 2023-12-04 | End: 2023-12-05

## 2023-12-04 RX ORDER — DEXTROSE MONOHYDRATE 25 G/50ML
50 INJECTION, SOLUTION INTRAVENOUS ONCE
Status: COMPLETED | OUTPATIENT
Start: 2023-12-04 | End: 2023-12-04

## 2023-12-04 RX ORDER — 0.9 % SODIUM CHLORIDE 0.9 %
500 INTRAVENOUS SOLUTION INTRAVENOUS ONCE
Status: COMPLETED | OUTPATIENT
Start: 2023-12-04 | End: 2023-12-05

## 2023-12-04 RX ADMIN — DEXTROSE MONOHYDRATE 50 ML: 25 INJECTION, SOLUTION INTRAVENOUS at 22:08

## 2023-12-04 RX ADMIN — SODIUM CHLORIDE 500 ML: 9 INJECTION, SOLUTION INTRAVENOUS at 22:09

## 2023-12-04 ASSESSMENT — PAIN SCALES - GENERAL: PAINLEVEL_OUTOF10: 0

## 2023-12-05 PROBLEM — E16.2 HYPOGLYCEMIA: Status: ACTIVE | Noted: 2023-12-05

## 2023-12-05 LAB
BACTERIA: ABNORMAL /HPF
BILIRUBIN URINE: NEGATIVE MG/DL
BLOOD, URINE: ABNORMAL
CLARITY: CLEAR
COLOR: YELLOW
EKG ATRIAL RATE: 86 BPM
EKG DIAGNOSIS: NORMAL
EKG P AXIS: 58 DEGREES
EKG P-R INTERVAL: 102 MS
EKG Q-T INTERVAL: 388 MS
EKG QRS DURATION: 100 MS
EKG QTC CALCULATION (BAZETT): 464 MS
EKG R AXIS: 65 DEGREES
EKG T AXIS: 25 DEGREES
EKG VENTRICULAR RATE: 86 BPM
ESTIMATED AVERAGE GLUCOSE: 189 MG/DL
ESTIMATED AVERAGE GLUCOSE: 194 MG/DL
GLUCOSE BLD-MCNC: 132 MG/DL (ref 70–99)
GLUCOSE BLD-MCNC: 183 MG/DL (ref 70–99)
GLUCOSE BLD-MCNC: 191 MG/DL (ref 70–99)
GLUCOSE BLD-MCNC: 247 MG/DL (ref 70–99)
GLUCOSE BLD-MCNC: 268 MG/DL (ref 70–99)
GLUCOSE BLD-MCNC: 75 MG/DL (ref 70–99)
GLUCOSE, URINE: 250 MG/DL
HBA1C MFR BLD: 8.2 % (ref 4.2–6.3)
HBA1C MFR BLD: 8.4 % (ref 4.2–6.3)
HYALINE CASTS: 0 /LPF
KETONES, URINE: NEGATIVE MG/DL
LACTIC ACID, SEPSIS: 1.4 MMOL/L (ref 0.4–2)
LEUKOCYTE ESTERASE, URINE: ABNORMAL
MUCUS: ABNORMAL HPF
NITRITE URINE, QUANTITATIVE: NEGATIVE
PH, URINE: 6 (ref 5–8)
PROCALCITONIN SERPL-MCNC: 0.03 NG/ML
PROTEIN UA: ABNORMAL MG/DL
RBC URINE: 6 /HPF (ref 0–6)
SPECIFIC GRAVITY UA: 1.02 (ref 1–1.03)
SQUAMOUS EPITHELIAL: 7 /HPF
TRICHOMONAS: ABNORMAL /HPF
UROBILINOGEN, URINE: 1 MG/DL (ref 0.2–1)
WBC UA: 2 /HPF (ref 0–5)

## 2023-12-05 PROCEDURE — 96368 THER/DIAG CONCURRENT INF: CPT

## 2023-12-05 PROCEDURE — 83605 ASSAY OF LACTIC ACID: CPT

## 2023-12-05 PROCEDURE — 96365 THER/PROPH/DIAG IV INF INIT: CPT

## 2023-12-05 PROCEDURE — 94761 N-INVAS EAR/PLS OXIMETRY MLT: CPT

## 2023-12-05 PROCEDURE — 82962 GLUCOSE BLOOD TEST: CPT

## 2023-12-05 PROCEDURE — 96366 THER/PROPH/DIAG IV INF ADDON: CPT

## 2023-12-05 PROCEDURE — 6370000000 HC RX 637 (ALT 250 FOR IP): Performed by: NURSE PRACTITIONER

## 2023-12-05 PROCEDURE — 96372 THER/PROPH/DIAG INJ SC/IM: CPT

## 2023-12-05 PROCEDURE — 87040 BLOOD CULTURE FOR BACTERIA: CPT

## 2023-12-05 PROCEDURE — 96361 HYDRATE IV INFUSION ADD-ON: CPT

## 2023-12-05 PROCEDURE — 2580000003 HC RX 258: Performed by: NURSE PRACTITIONER

## 2023-12-05 PROCEDURE — 6360000002 HC RX W HCPCS: Performed by: PHYSICIAN ASSISTANT

## 2023-12-05 PROCEDURE — 93010 ELECTROCARDIOGRAM REPORT: CPT | Performed by: INTERNAL MEDICINE

## 2023-12-05 PROCEDURE — 2580000003 HC RX 258: Performed by: PHYSICIAN ASSISTANT

## 2023-12-05 PROCEDURE — 84145 PROCALCITONIN (PCT): CPT

## 2023-12-05 PROCEDURE — 36415 COLL VENOUS BLD VENIPUNCTURE: CPT

## 2023-12-05 PROCEDURE — 83036 HEMOGLOBIN GLYCOSYLATED A1C: CPT

## 2023-12-05 PROCEDURE — 87086 URINE CULTURE/COLONY COUNT: CPT

## 2023-12-05 PROCEDURE — 96367 TX/PROPH/DG ADDL SEQ IV INF: CPT

## 2023-12-05 PROCEDURE — 6360000002 HC RX W HCPCS: Performed by: NURSE PRACTITIONER

## 2023-12-05 PROCEDURE — 81001 URINALYSIS AUTO W/SCOPE: CPT

## 2023-12-05 PROCEDURE — G0378 HOSPITAL OBSERVATION PER HR: HCPCS

## 2023-12-05 RX ORDER — MAGNESIUM SULFATE IN WATER 40 MG/ML
2000 INJECTION, SOLUTION INTRAVENOUS PRN
Status: DISCONTINUED | OUTPATIENT
Start: 2023-12-05 | End: 2023-12-06 | Stop reason: HOSPADM

## 2023-12-05 RX ORDER — POTASSIUM CHLORIDE 7.45 MG/ML
10 INJECTION INTRAVENOUS PRN
Status: DISCONTINUED | OUTPATIENT
Start: 2023-12-05 | End: 2023-12-06 | Stop reason: HOSPADM

## 2023-12-05 RX ORDER — POLYETHYLENE GLYCOL 3350 17 G
2 POWDER IN PACKET (EA) ORAL
Status: DISCONTINUED | OUTPATIENT
Start: 2023-12-05 | End: 2023-12-06 | Stop reason: HOSPADM

## 2023-12-05 RX ORDER — ENOXAPARIN SODIUM 100 MG/ML
40 INJECTION SUBCUTANEOUS DAILY
Status: DISCONTINUED | OUTPATIENT
Start: 2023-12-05 | End: 2023-12-06 | Stop reason: HOSPADM

## 2023-12-05 RX ORDER — SODIUM CHLORIDE 0.9 % (FLUSH) 0.9 %
5-40 SYRINGE (ML) INJECTION EVERY 12 HOURS SCHEDULED
Status: DISCONTINUED | OUTPATIENT
Start: 2023-12-05 | End: 2023-12-06 | Stop reason: HOSPADM

## 2023-12-05 RX ORDER — INSULIN LISPRO 100 [IU]/ML
0-4 INJECTION, SOLUTION INTRAVENOUS; SUBCUTANEOUS
Status: DISCONTINUED | OUTPATIENT
Start: 2023-12-05 | End: 2023-12-06 | Stop reason: HOSPADM

## 2023-12-05 RX ORDER — INSULIN GLARGINE 100 [IU]/ML
20 INJECTION, SOLUTION SUBCUTANEOUS NIGHTLY
Status: DISCONTINUED | OUTPATIENT
Start: 2023-12-05 | End: 2023-12-06 | Stop reason: HOSPADM

## 2023-12-05 RX ORDER — INSULIN LISPRO 100 [IU]/ML
6 INJECTION, SOLUTION INTRAVENOUS; SUBCUTANEOUS 2 TIMES DAILY WITH MEALS
Status: DISCONTINUED | OUTPATIENT
Start: 2023-12-05 | End: 2023-12-06 | Stop reason: HOSPADM

## 2023-12-05 RX ORDER — ACETAMINOPHEN 325 MG/1
650 TABLET ORAL EVERY 6 HOURS PRN
Status: DISCONTINUED | OUTPATIENT
Start: 2023-12-05 | End: 2023-12-06 | Stop reason: HOSPADM

## 2023-12-05 RX ORDER — POTASSIUM CHLORIDE 20 MEQ/1
40 TABLET, EXTENDED RELEASE ORAL PRN
Status: DISCONTINUED | OUTPATIENT
Start: 2023-12-05 | End: 2023-12-06 | Stop reason: HOSPADM

## 2023-12-05 RX ORDER — LISINOPRIL 5 MG/1
5 TABLET ORAL DAILY
Status: DISCONTINUED | OUTPATIENT
Start: 2023-12-05 | End: 2023-12-06 | Stop reason: HOSPADM

## 2023-12-05 RX ORDER — ERYTHROMYCIN 5 MG/G
OINTMENT OPHTHALMIC EVERY 8 HOURS SCHEDULED
Status: DISCONTINUED | OUTPATIENT
Start: 2023-12-05 | End: 2023-12-06 | Stop reason: HOSPADM

## 2023-12-05 RX ORDER — POLYETHYLENE GLYCOL 3350 17 G/17G
17 POWDER, FOR SOLUTION ORAL DAILY PRN
Status: DISCONTINUED | OUTPATIENT
Start: 2023-12-05 | End: 2023-12-06 | Stop reason: HOSPADM

## 2023-12-05 RX ORDER — ONDANSETRON 2 MG/ML
4 INJECTION INTRAMUSCULAR; INTRAVENOUS EVERY 6 HOURS PRN
Status: DISCONTINUED | OUTPATIENT
Start: 2023-12-05 | End: 2023-12-06 | Stop reason: HOSPADM

## 2023-12-05 RX ORDER — ACETAMINOPHEN 650 MG/1
650 SUPPOSITORY RECTAL EVERY 6 HOURS PRN
Status: DISCONTINUED | OUTPATIENT
Start: 2023-12-05 | End: 2023-12-06 | Stop reason: HOSPADM

## 2023-12-05 RX ORDER — INSULIN LISPRO 100 [IU]/ML
0-4 INJECTION, SOLUTION INTRAVENOUS; SUBCUTANEOUS NIGHTLY
Status: DISCONTINUED | OUTPATIENT
Start: 2023-12-05 | End: 2023-12-06 | Stop reason: HOSPADM

## 2023-12-05 RX ORDER — SODIUM CHLORIDE 0.9 % (FLUSH) 0.9 %
10 SYRINGE (ML) INJECTION PRN
Status: DISCONTINUED | OUTPATIENT
Start: 2023-12-05 | End: 2023-12-06 | Stop reason: HOSPADM

## 2023-12-05 RX ORDER — GLUCAGON 1 MG/ML
1 KIT INJECTION PRN
Status: DISCONTINUED | OUTPATIENT
Start: 2023-12-05 | End: 2023-12-06 | Stop reason: HOSPADM

## 2023-12-05 RX ORDER — ONDANSETRON 4 MG/1
4 TABLET, ORALLY DISINTEGRATING ORAL EVERY 8 HOURS PRN
Status: DISCONTINUED | OUTPATIENT
Start: 2023-12-05 | End: 2023-12-06 | Stop reason: HOSPADM

## 2023-12-05 RX ORDER — DEXTROSE MONOHYDRATE 100 MG/ML
INJECTION, SOLUTION INTRAVENOUS CONTINUOUS PRN
Status: DISCONTINUED | OUTPATIENT
Start: 2023-12-05 | End: 2023-12-06 | Stop reason: HOSPADM

## 2023-12-05 RX ORDER — INSULIN ASPART 100 [IU]/ML
18 INJECTION, SUSPENSION SUBCUTANEOUS 2 TIMES DAILY WITH MEALS
Status: DISCONTINUED | OUTPATIENT
Start: 2023-12-05 | End: 2023-12-05

## 2023-12-05 RX ORDER — SODIUM CHLORIDE 9 MG/ML
INJECTION, SOLUTION INTRAVENOUS PRN
Status: DISCONTINUED | OUTPATIENT
Start: 2023-12-05 | End: 2023-12-06 | Stop reason: HOSPADM

## 2023-12-05 RX ADMIN — ERYTHROMYCIN: 5 OINTMENT OPHTHALMIC at 11:47

## 2023-12-05 RX ADMIN — LISINOPRIL 5 MG: 5 TABLET ORAL at 11:48

## 2023-12-05 RX ADMIN — SODIUM CHLORIDE: 9 INJECTION, SOLUTION INTRAVENOUS at 11:46

## 2023-12-05 RX ADMIN — ERYTHROMYCIN: 5 OINTMENT OPHTHALMIC at 21:18

## 2023-12-05 RX ADMIN — ENOXAPARIN SODIUM 40 MG: 100 INJECTION SUBCUTANEOUS at 11:48

## 2023-12-05 RX ADMIN — CEFTRIAXONE 1000 MG: 1 INJECTION, POWDER, FOR SOLUTION INTRAMUSCULAR; INTRAVENOUS at 00:08

## 2023-12-05 RX ADMIN — INSULIN LISPRO 6 UNITS: 100 INJECTION, SOLUTION INTRAVENOUS; SUBCUTANEOUS at 16:45

## 2023-12-05 RX ADMIN — INSULIN GLARGINE 20 UNITS: 100 INJECTION, SOLUTION SUBCUTANEOUS at 21:18

## 2023-12-05 RX ADMIN — INSULIN LISPRO 1 UNITS: 100 INJECTION, SOLUTION INTRAVENOUS; SUBCUTANEOUS at 11:46

## 2023-12-05 RX ADMIN — VANCOMYCIN HYDROCHLORIDE 1750 MG: 5 INJECTION, POWDER, LYOPHILIZED, FOR SOLUTION INTRAVENOUS at 01:07

## 2023-12-05 RX ADMIN — SODIUM CHLORIDE 1000 ML: 9 INJECTION, SOLUTION INTRAVENOUS at 00:07

## 2023-12-05 RX ADMIN — INSULIN LISPRO 2 UNITS: 100 INJECTION, SOLUTION INTRAVENOUS; SUBCUTANEOUS at 16:45

## 2023-12-05 RX ADMIN — SODIUM CHLORIDE: 9 INJECTION, SOLUTION INTRAVENOUS at 01:00

## 2023-12-05 ASSESSMENT — PAIN SCALES - GENERAL: PAINLEVEL_OUTOF10: 0

## 2023-12-05 NOTE — ED NOTES
Pt's temp 99.5 on bairhugger; Deepti Ortega MD made aware. [id number] [facility] removed via verbal order from provider.       Keila Schmid RN  12/05/23 [id number]

## 2023-12-05 NOTE — ED NOTES
Providers notified of patient's status. Bear hugger placed on patient for temp of 92.1 rectally.      Marlon Wang RN  12/04/23 6990

## 2023-12-05 NOTE — ED NOTES
Pt arrived to ED via EMS from home. Per EMS, pt was unconscious on their arrival. BGL 39. EMS started D10 via IV. Pt alert and oriented on arrival. BGL at ED 92. Oral temp 85.3; rectal temp 92.1. EMETERIO Estrada aware. Bear ricoer placed on pt at this time.       Phillip Ji RN  12/04/23 2121

## 2023-12-05 NOTE — ED NOTES
Report given to [facility], RN. Care assumed at this time.      Gia Shah RN  12/05/23 3389

## 2023-12-05 NOTE — ED NOTES
ED TO INPATIENT SBAR HANDOFF    Patient Name: Amber Caicedo   :  1995  25 y.o. Preferred Name     Family/Caregiver Present no   Restraints no   C-SSRS: Risk of Suicide: No Risk  Sitter no   Sepsis Risk Score Sepsis Risk Score: 9.42      Situation  Chief Complaint   Patient presents with    Hypoglycemia     Original blood glucose 39     Brief Description of Patient's Condition:      Pt arrived to ED via EMS from home. Per EMS, pt was unconscious on their arrival. BGL 39. EMS started D10 via IV. Pt alert and oriented on arrival. BGL at ED 92. Oral temp 85.3; rectal temp 92.1. Mental Status: alert  Arrived from: home    Imaging:   XR CHEST PORTABLE   Final Result   Shallow inflation with findings consistent with subsegmental atelectasis.            Abnormal labs:   Abnormal Labs Reviewed   CBC WITH AUTO DIFFERENTIAL - Abnormal; Notable for the following components:       Result Value    WBC 12.4 (*)     MCHC 31.3 (*)     Segs Relative 67.4 (*)     Monocytes % 5.6 (*)     All other components within normal limits   COMPREHENSIVE METABOLIC PANEL - Abnormal; Notable for the following components:    Potassium 3.3 (*)     ALT 6 (*)     AST 9 (*)     All other components within normal limits   URINALYSIS - Abnormal; Notable for the following components:    Glucose, Urine 250 (*)     Blood, Urine SMALL NUMBER OR AMOUNT OBSERVED (*)     Protein, UA TRACE (*)     Leukocyte Esterase, Urine SMALL NUMBER OR AMOUNT OBSERVED (*)     All other components within normal limits   LACTATE, SEPSIS - Abnormal; Notable for the following components:    Lactic Acid, Sepsis 2.3 (*)     All other components within normal limits   MICROSCOPIC URINALYSIS - Abnormal; Notable for the following components:    Bacteria, UA FEW (*)     Mucus, UA FEW (*)     All other components within normal limits   POCT GLUCOSE - Abnormal; Notable for the following components:    POC Glucose 111 (*)     All other components within normal limits

## 2023-12-05 NOTE — PROGRESS NOTES
4 Eyes Skin Assessment     NAME:  Estela Perales  YOB: 1995  MEDICAL RECORD NUMBER:  [id number]    The patient is being assessed for  Admission    I agree that at least one RN has performed a thorough Head to Toe Skin Assessment on the patient. ALL assessment sites listed below have been assessed. Areas assessed by both nurses:    Head, Face, Ears, Shoulders, Back, Chest, Arms, Elbows, Hands, Sacrum. Buttock, Coccyx, Ischium, Legs. Feet and Heels, and Under Medical Devices         Does the Patient have a Wound?  No noted wound(s)       Navid Prevention initiated by RN: No  Wound Care Orders initiated by RN: No    Pressure Injury (Stage 3,4, Unstageable, DTI, NWPT, and Complex wounds) if present, place Wound referral order by RN under : No    New Ostomies, if present place, Ostomy referral order under : No     Nurse 1 eSignature: Electronically signed by Cam Motta RN on 12/5/23 at 6:38 PM EST    **SHARE this note so that the co-signing nurse can place an eSignature**    Nurse 2 eSignature: Electronically signed by Charles Castle RN on 12/5/23 at 8:23 PM EST

## 2023-12-05 NOTE — H&P
V2.0  History and Physical      Name:  Janie Aguilar /Age/Sex: 1995  (25 y.o. female)   MRN & CSN:  [id number] & [id number] Encounter Date/Time: 2023 8:30 AM EST   Location:  OBS  PCP: Mumtaz Blake MD       Hospital Day: 2    Assessment and Plan:   Janie Aguilar is a 25 y.o. female with a pmh of type 1 diabetes, who presents with Hypoglycemia    Hospital Problems             Last Modified POA    * (Principal) Hypoglycemia 2023 Yes     Hypoglycemia  Type 1 diabetes  -Patient was found unconsciousness yesterday evening with glucose 20  -Received D10 W and food  -Place the patient to observation unit  -Monitor blood sugar  -Resume home insulin arrangement  -Insulin sliding scale  -Update hemoglobin A1c which is 8.4 today    Hypothermia  -Temperature was 95  -White cell 12.4  -Lactic acid 2.3, repeat lactic acid is 1.4  -No tachycardia no signs of infection  -Monitor temperature every 4 hours  -Procalcitonin is 0.028 which is negative  -Hold antibiotics for now    Conjunctivitis  -Had left eye surgery a month ago  -Mild purulent drainage in left conjunctiva  -Erythromycin ointment every 8 hour  -Outpatient follow-up with ophthalmology      Disposition:   Current Living situation: Home  Expected Disposition: Home  Estimated D/C: 24-hour    Diet ADULT DIET; Regular; 5 carb choices (75 gm/meal)   DVT Prophylaxis [x] Lovenox, []  Heparin, [] SCDs, [] Ambulation,  [] Eliquis, [] Xarelto   Code Status Full Code   Surrogate Decision Maker/ POA      History from:     patient    History of Present Illness:     Chief Complaint: Low blood sugar  Estela Mascorro is a 25 y.o. female with pmh of type 1 diabetes who presents with low blood sugar. Patient stated she is not supposed to inject insulin 18 units at night. However she inject a little bit more last night. She felt dizziness and then passed out. Patient's grandma checked patient and called ambulance.   Patient was found

## 2023-12-05 NOTE — ED PROVIDER NOTES
This is a 70-year-old type I diabetic with some cognitive impairment seen in collaboration with EMETERIO Goldberg. Patient presents emergency room with severe hyperglycemia. Poorly patient glucose was 36. Patient received IV D10 by EMS in route and glucose improved to 125. Patient became alert and oriented and conversational.  Peter Kilts glucose by fingerstick dropped to 80 at which point patient was given amp of D50. He is found to be severely hypothermic. She was placed on Angélica hugger's. His lactic acid is elevated at 2.3. CBC reveals elevated white count. Even though patient has no fever or chills and denies headache, neck pain, abdominal pain, diarrhea or vomiting there is no explanation for patient's severe hypothermia. It is presumed that patient could be septic. Patient received broad-spectrum antibiotics Vanco Zosyn. Patient will be now admitted by the hospital service for further care recommendations. EKG reveals normal sinus rhythm ventricular rate 86 bpm without ST elevations or depressions or Q waves. QTc is 464 ms QRS duration is 100 ms and SD interval is 102 ms.      Andree Meek MD  12/05/23 2271
chloride flush 0.9 % injection 10 mL (has no administration in time range)   0.9 % sodium chloride infusion (has no administration in time range)   potassium chloride (KLOR-CON M) extended release tablet 40 mEq (has no administration in time range)     Or   potassium bicarb-citric acid (EFFER-K) effervescent tablet 40 mEq (has no administration in time range)     Or   potassium chloride 10 mEq/100 mL IVPB (Peripheral Line) (has no administration in time range)   magnesium sulfate 2000 mg in 50 mL IVPB premix (has no administration in time range)   enoxaparin (LOVENOX) injection 40 mg (40 mg SubCUTAneous Given 12/5/23 1148)   ondansetron (ZOFRAN-ODT) disintegrating tablet 4 mg (has no administration in time range)     Or   ondansetron (ZOFRAN) injection 4 mg (has no administration in time range)   polyethylene glycol (GLYCOLAX) packet 17 g (has no administration in time range)   nicotine polacrilex (COMMIT) lozenge 2 mg (has no administration in time range)   acetaminophen (TYLENOL) tablet 650 mg (has no administration in time range)     Or   acetaminophen (TYLENOL) suppository 650 mg (has no administration in time range)   erythromycin [facility]) ophthalmic ointment ( Left Eye Given 12/5/23 1147)   insulin glargine (LANTUS) injection vial 20 Units (has no administration in time range)     And   insulin lispro (HUMALOG) injection vial 6 Units (6 Units SubCUTAneous Given 12/5/23 1645)   glucose chewable tablet 16 g (has no administration in time range)   dextrose bolus 10% 125 mL (has no administration in time range)     Or   dextrose bolus 10% 250 mL (has no administration in time range)   glucagon injection 1 mg (has no administration in time range)   dextrose 10 % infusion (has no administration in time range)   dextrose 50 % IV solution (50 mLs IntraVENous Given 12/4/23 2208)   sodium chloride 0.9 % bolus 500 mL (0 mLs IntraVENous Stopped 12/5/23 1022)   vancomycin (VANCOCIN) 1,750 mg in sodium chloride 0.9 % 500

## 2023-12-06 VITALS
WEIGHT: 155.65 LBS | BODY MASS INDEX: 28.64 KG/M2 | HEART RATE: 86 BPM | TEMPERATURE: 98.1 F | RESPIRATION RATE: 11 BRPM | HEIGHT: 62 IN | DIASTOLIC BLOOD PRESSURE: 77 MMHG | SYSTOLIC BLOOD PRESSURE: 124 MMHG | OXYGEN SATURATION: 98 %

## 2023-12-06 LAB
CULTURE: NORMAL
GLUCOSE BLD-MCNC: 126 MG/DL (ref 70–99)
GLUCOSE BLD-MCNC: 139 MG/DL (ref 70–99)
Lab: NORMAL
SPECIMEN: NORMAL

## 2023-12-06 PROCEDURE — G0378 HOSPITAL OBSERVATION PER HR: HCPCS

## 2023-12-06 PROCEDURE — 6370000000 HC RX 637 (ALT 250 FOR IP): Performed by: NURSE PRACTITIONER

## 2023-12-06 PROCEDURE — 2580000003 HC RX 258: Performed by: NURSE PRACTITIONER

## 2023-12-06 PROCEDURE — 6360000002 HC RX W HCPCS: Performed by: NURSE PRACTITIONER

## 2023-12-06 PROCEDURE — 96372 THER/PROPH/DIAG INJ SC/IM: CPT

## 2023-12-06 PROCEDURE — 96361 HYDRATE IV INFUSION ADD-ON: CPT

## 2023-12-06 PROCEDURE — 82962 GLUCOSE BLOOD TEST: CPT

## 2023-12-06 RX ADMIN — INSULIN LISPRO 6 UNITS: 100 INJECTION, SOLUTION INTRAVENOUS; SUBCUTANEOUS at 09:31

## 2023-12-06 RX ADMIN — SODIUM CHLORIDE, PRESERVATIVE FREE 10 ML: 5 INJECTION INTRAVENOUS at 09:31

## 2023-12-06 RX ADMIN — LISINOPRIL 5 MG: 5 TABLET ORAL at 09:31

## 2023-12-06 RX ADMIN — ENOXAPARIN SODIUM 40 MG: 100 INJECTION SUBCUTANEOUS at 09:31

## 2023-12-06 NOTE — DISCHARGE SUMMARY
Results   Component Value Date/Time    NITRU NEGATIVE 12/05/2023 02:00 AM    NITRU Negative 03/11/2019 04:20 PM    NITRU NEGATIVE 07/10/2011 10:10 PM    COLORU YELLOW 12/05/2023 02:00 AM    PHUR 5.5 11/29/2022 02:55 PM    PHUR 6.0 03/11/2019 04:20 PM    WBCUA 2 12/05/2023 02:00 AM    RBCUA 6 12/05/2023 02:00 AM    MUCUS FEW 12/05/2023 02:00 AM    TRICHOMONAS NONE SEEN 12/05/2023 02:00 AM    YEAST FEW 09/03/2012 11:30 AM    BACTERIA FEW 12/05/2023 02:00 AM    CLARITYU CLEAR 12/05/2023 02:00 AM    SPECGRAV 1.020 12/05/2023 02:00 AM    LEUKOCYTESUR SMALL NUMBER OR AMOUNT OBSERVED 12/05/2023 02:00 AM    UROBILINOGEN 1.0 12/05/2023 02:00 AM    BILIRUBINUR NEGATIVE 12/05/2023 02:00 AM    BILIRUBINUR neg 11/29/2022 02:55 PM    BLOODU SMALL NUMBER OR AMOUNT OBSERVED 12/05/2023 02:00 AM    GLUCOSEU 500 11/29/2022 02:55 PM    GLUCOSEU >=1000 03/11/2019 04:20 PM    KETUA NEGATIVE 12/05/2023 02:00 AM    AMORPHOUS RARE 12/28/2017 01:31 PM     Urine Cultures: No results found for: \"LABURIN\"  Blood Cultures: No results found for: \"BC\"  No results found for: \"BLOODCULT2\"  Organism:   Lab Results   Component Value Date/Time    [facility] 47/76/2840 48:64 PM       Time Spent Discharging patient 35 minutes    Electronically signed by JOAN Petersen CNP on 12/6/2023 at 9:29 AM

## 2023-12-10 LAB
CULTURE: NORMAL
CULTURE: NORMAL
Lab: NORMAL
Lab: NORMAL
SPECIMEN: NORMAL
SPECIMEN: NORMAL

## 2023-12-14 DIAGNOSIS — E10.42 TYPE 1 DIABETES MELLITUS WITH DIABETIC POLYNEUROPATHY (HCC): Primary | ICD-10-CM

## 2024-02-15 ENCOUNTER — OFFICE VISIT (OUTPATIENT)
Dept: FAMILY MEDICINE CLINIC | Age: 29
End: 2024-02-15
Payer: COMMERCIAL

## 2024-02-15 VITALS
WEIGHT: 150 LBS | DIASTOLIC BLOOD PRESSURE: 74 MMHG | SYSTOLIC BLOOD PRESSURE: 120 MMHG | BODY MASS INDEX: 24.96 KG/M2 | TEMPERATURE: 98.4 F | OXYGEN SATURATION: 98 % | HEART RATE: 104 BPM

## 2024-02-15 VITALS
OXYGEN SATURATION: 98 % | BODY MASS INDEX: 25.06 KG/M2 | WEIGHT: 150.4 LBS | SYSTOLIC BLOOD PRESSURE: 110 MMHG | HEIGHT: 65 IN | DIASTOLIC BLOOD PRESSURE: 80 MMHG | HEART RATE: 95 BPM

## 2024-02-15 DIAGNOSIS — E10.29 TYPE 1 DIABETES MELLITUS WITH MICROALBUMINURIA (HCC): Primary | ICD-10-CM

## 2024-02-15 DIAGNOSIS — E10.22 TYPE 1 DIABETES MELLITUS WITH STAGE 1 CHRONIC KIDNEY DISEASE (HCC): ICD-10-CM

## 2024-02-15 DIAGNOSIS — E10.649 HYPOGLYCEMIA UNAWARENESS ASSOCIATED WITH TYPE 1 DIABETES MELLITUS (HCC): ICD-10-CM

## 2024-02-15 DIAGNOSIS — L72.3 SEBACEOUS CYST OF AXILLA: Primary | ICD-10-CM

## 2024-02-15 DIAGNOSIS — E10.3521: ICD-10-CM

## 2024-02-15 DIAGNOSIS — N18.1 CHRONIC KIDNEY DISEASE (CKD) STAGE G1/A1, GLOMERULAR FILTRATION RATE (GFR) EQUAL TO OR GREATER THAN 90 ML/MIN/1.73 SQUARE METER AND ALBUMINURIA CREATININE RATIO LESS THAN 30 MG/G: ICD-10-CM

## 2024-02-15 DIAGNOSIS — N18.1 TYPE 1 DIABETES MELLITUS WITH STAGE 1 CHRONIC KIDNEY DISEASE (HCC): ICD-10-CM

## 2024-02-15 DIAGNOSIS — F79 INTELLECTUAL DISABILITY: ICD-10-CM

## 2024-02-15 DIAGNOSIS — Z97.8 USES SELF-APPLIED CONTINUOUS GLUCOSE MONITORING DEVICE: ICD-10-CM

## 2024-02-15 DIAGNOSIS — E10.3522: ICD-10-CM

## 2024-02-15 DIAGNOSIS — R80.9 TYPE 1 DIABETES MELLITUS WITH MICROALBUMINURIA (HCC): Primary | ICD-10-CM

## 2024-02-15 DIAGNOSIS — E10.42 TYPE 1 DIABETES MELLITUS WITH DIABETIC POLYNEUROPATHY (HCC): ICD-10-CM

## 2024-02-15 PROCEDURE — 99214 OFFICE O/P EST MOD 30 MIN: CPT

## 2024-02-15 PROCEDURE — G8482 FLU IMMUNIZE ORDER/ADMIN: HCPCS | Performed by: FAMILY MEDICINE

## 2024-02-15 PROCEDURE — 3046F HEMOGLOBIN A1C LEVEL >9.0%: CPT

## 2024-02-15 PROCEDURE — G8482 FLU IMMUNIZE ORDER/ADMIN: HCPCS

## 2024-02-15 PROCEDURE — 2022F DILAT RTA XM EVC RTNOPTHY: CPT

## 2024-02-15 PROCEDURE — G8420 CALC BMI NORM PARAMETERS: HCPCS | Performed by: FAMILY MEDICINE

## 2024-02-15 PROCEDURE — 1036F TOBACCO NON-USER: CPT

## 2024-02-15 PROCEDURE — 99213 OFFICE O/P EST LOW 20 MIN: CPT | Performed by: FAMILY MEDICINE

## 2024-02-15 PROCEDURE — G8420 CALC BMI NORM PARAMETERS: HCPCS

## 2024-02-15 PROCEDURE — G8427 DOCREV CUR MEDS BY ELIG CLIN: HCPCS | Performed by: FAMILY MEDICINE

## 2024-02-15 PROCEDURE — G8427 DOCREV CUR MEDS BY ELIG CLIN: HCPCS

## 2024-02-15 PROCEDURE — 1036F TOBACCO NON-USER: CPT | Performed by: FAMILY MEDICINE

## 2024-02-15 NOTE — PATIENT INSTRUCTIONS
NEW OFFICE HOURS: M-TH 7AM-5PM      BRING YOUR MEDICATION BOTTLES TO ALL APPOINTMENTS    Check MY CHART for test results.  If you have forgotten your password, call [phone].    The diagnoses and medications listed in this after visit summary may not be up to date.  Check MY CHART in 28-48 hours for corrections.      Late cancellation policy: So that we can better accommodate people who are sick, please give our office 24 hour notice for an appointment cancellation.      Missed appointments: Your care is very important to us.  It is important that you keep your scheduled appointments.   Multiple missed appointments will lead to a dismissal from the office.      Patients arriving late will be worked into the schedule as time permits, with patients arriving on time taken as scheduled. Late arriving patients are more than welcome to wait or reschedule their appointments.    Please allow 5-7 business days for paperwork to be processed.

## 2024-02-15 NOTE — PROGRESS NOTES
cyst of axilla        2. Chronic kidney disease (CKD) stage G1/A1, glomerular filtration rate (GFR) equal to or greater than 90 mL/min/1.73 square meter and albuminuria creatinine ratio less than 30 mg/g  Microalbumin / Creatinine Urine Ratio              Plan:      Can either leave mass alone or see surgeon.  She will think about it    Hx microalbuminuria.  Re-check today and increase ACEI if needed.      Return if symptoms worsen or fail to improve.

## 2024-02-15 NOTE — PROGRESS NOTES
take a reduced amount of insulin    6. 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 Type 1 diabetes mellitus with right eye affected by proliferative retinopathy and traction retinal detachment involving macula (HCC)  - scheduled for more surgery, continue to follow with specialist    8. Intellectual disability  - patient would benefit from bringing a support person, family member, etc to appointments     Patient should return sooner if she needs help with setting up new CGM or sooner if symptoms become worse or fail to improve.    I have personally reviewed the health record, including provider notes, laboratory data and current medications before making these care and education recommendations. The time spent in this effort is included in the total time.  Total minutes:  not billed by time

## 2024-02-16 LAB
CREAT UR-MCNC: 105.1 MG/DL (ref 28–259)
MICROALBUMIN UR DL<=1MG/L-MCNC: 16.7 MG/DL
MICROALBUMIN/CREAT UR: 158.9 MG/G (ref 0–30)

## 2024-02-19 DIAGNOSIS — R80.9 MICROALBUMINURIA: ICD-10-CM

## 2024-02-19 RX ORDER — LISINOPRIL 10 MG/1
10 TABLET ORAL DAILY
Qty: 90 TABLET | Refills: 3 | Status: SHIPPED | OUTPATIENT
Start: 2024-02-19

## 2024-03-04 ENCOUNTER — OFFICE VISIT (OUTPATIENT)
Dept: FAMILY MEDICINE CLINIC | Age: 29
End: 2024-03-04
Payer: COMMERCIAL

## 2024-03-04 VITALS
WEIGHT: 146.6 LBS | SYSTOLIC BLOOD PRESSURE: 108 MMHG | HEART RATE: 83 BPM | OXYGEN SATURATION: 98 % | DIASTOLIC BLOOD PRESSURE: 60 MMHG | BODY MASS INDEX: 24.43 KG/M2 | HEIGHT: 65 IN

## 2024-03-04 DIAGNOSIS — E10.42 TYPE 1 DIABETES MELLITUS WITH DIABETIC POLYNEUROPATHY (HCC): ICD-10-CM

## 2024-03-04 DIAGNOSIS — F79 INTELLECTUAL DISABILITY: ICD-10-CM

## 2024-03-04 DIAGNOSIS — E10.3522: ICD-10-CM

## 2024-03-04 DIAGNOSIS — Z97.8 USES SELF-APPLIED CONTINUOUS GLUCOSE MONITORING DEVICE: ICD-10-CM

## 2024-03-04 DIAGNOSIS — E10.3521: ICD-10-CM

## 2024-03-04 DIAGNOSIS — E10.22 TYPE 1 DIABETES MELLITUS WITH STAGE 1 CHRONIC KIDNEY DISEASE (HCC): ICD-10-CM

## 2024-03-04 DIAGNOSIS — N18.1 TYPE 1 DIABETES MELLITUS WITH STAGE 1 CHRONIC KIDNEY DISEASE (HCC): ICD-10-CM

## 2024-03-04 DIAGNOSIS — E10.649 HYPOGLYCEMIA UNAWARENESS ASSOCIATED WITH TYPE 1 DIABETES MELLITUS (HCC): Primary | ICD-10-CM

## 2024-03-04 LAB
CHP ED QC CHECK: ABNORMAL
GLUCOSE BLD-MCNC: 59 MG/DL

## 2024-03-04 PROCEDURE — 95251 CONT GLUC MNTR ANALYSIS I&R: CPT

## 2024-03-04 PROCEDURE — 82962 GLUCOSE BLOOD TEST: CPT

## 2024-03-04 PROCEDURE — G8482 FLU IMMUNIZE ORDER/ADMIN: HCPCS

## 2024-03-04 PROCEDURE — 1036F TOBACCO NON-USER: CPT

## 2024-03-04 PROCEDURE — G8427 DOCREV CUR MEDS BY ELIG CLIN: HCPCS

## 2024-03-04 PROCEDURE — G8420 CALC BMI NORM PARAMETERS: HCPCS

## 2024-03-04 PROCEDURE — 3046F HEMOGLOBIN A1C LEVEL >9.0%: CPT

## 2024-03-04 PROCEDURE — 99214 OFFICE O/P EST MOD 30 MIN: CPT

## 2024-03-04 PROCEDURE — 2022F DILAT RTA XM EVC RTNOPTHY: CPT

## 2024-03-04 NOTE — PROGRESS NOTES
of 70%.  Percent of variability at 45.1% indicates that glycemic control is not stable.  Time spent in hypoglycemia range (<70)/hypoglycemic events is not at target.  Hypoglycemia is occurring:  morning, overnight, afternoon  Time spent in hyperglycemia range (>180) is not at target.  Hyperglycemia is occurring: in the middle of the day in the afternoon in the evening    Assessment/Plan:  1. Hypoglycemia unawareness associated with type 1 diabetes mellitus (HCC)  - POCT Glucose was checked due to CGM reading LO at time of download.  Provided pt with 15g of sugar.  Waited at least 15-20 minutes & fingerstick reading was 59.  Pt denied any symptoms of hypoglycemia.    2. Type 1 diabetes mellitus with right eye affected by proliferative retinopathy and traction retinal detachment involving macula (HCC)  - continue to follow with retinal specialist  - patient was counseled at length regarding the need to better regulate her glucose levels to avoid surgical complications & recurring or permanent vision problems.    3. 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    4. Type 1 diabetes mellitus with stage 1 chronic kidney disease (HCC)  - continue lisinopril  - patient was counseled at length regarding the need to better regulate her glucose levels to avoid worsening CKD    5. Type 1 diabetes mellitus with diabetic polyneuropathy (HCC)  - pt has upcoming appt with neuro    6. Intellectual disability  - pt is advised that she needs to be accompanied by a friend or family member that can assist in her care.  - her safety is at risk because she can not comprehend the instructions for how to properly care for herself.    7. Uses

## 2024-03-06 ENCOUNTER — NURSE ONLY (OUTPATIENT)
Dept: FAMILY MEDICINE CLINIC | Age: 29
End: 2024-03-06

## 2024-03-06 ENCOUNTER — PROCEDURE VISIT (OUTPATIENT)
Dept: NEUROLOGY | Age: 29
End: 2024-03-06
Payer: COMMERCIAL

## 2024-03-06 DIAGNOSIS — N18.1 CHRONIC KIDNEY DISEASE (CKD) STAGE G1/A1, GLOMERULAR FILTRATION RATE (GFR) EQUAL TO OR GREATER THAN 90 ML/MIN/1.73 SQUARE METER AND ALBUMINURIA CREATININE RATIO LESS THAN 30 MG/G: Primary | ICD-10-CM

## 2024-03-06 DIAGNOSIS — E11.42 DIABETIC PERIPHERAL NEUROPATHY (HCC): Primary | ICD-10-CM

## 2024-03-06 PROCEDURE — 95910 NRV CNDJ TEST 7-8 STUDIES: CPT | Performed by: STUDENT IN AN ORGANIZED HEALTH CARE EDUCATION/TRAINING PROGRAM

## 2024-03-06 NOTE — PROGRESS NOTES
Patient was seen today for blood work.  Hard stick. Unable to get blood left arm.  1 stick.      Chasity tried unable to draw patient.  [Patient was sent to the lab with BMP orders to have drawn on 3/7/24

## 2024-03-07 ENCOUNTER — HOSPITAL ENCOUNTER (OUTPATIENT)
Age: 29
Discharge: HOME | End: 2024-03-07
Payer: COMMERCIAL

## 2024-03-07 DIAGNOSIS — N18.1 CHRONIC KIDNEY DISEASE (CKD) STAGE G1/A1, GLOMERULAR FILTRATION RATE (GFR) EQUAL TO OR GREATER THAN 90 ML/MIN/1.73 SQUARE METER AND ALBUMINURIA CREATININE RATIO LESS THAN 30 MG/G: ICD-10-CM

## 2024-03-07 LAB
ANION GAP SERPL CALCULATED.3IONS-SCNC: 11 MMOL/L (ref 7–16)
BUN SERPL-MCNC: 8 MG/DL (ref 6–23)
CALCIUM SERPL-MCNC: 8.6 MG/DL (ref 8.3–10.6)
CHLORIDE BLD-SCNC: 106 MMOL/L (ref 99–110)
CO2: 25 MMOL/L (ref 21–32)
CREAT SERPL-MCNC: 0.5 MG/DL (ref 0.6–1.1)
GFR SERPL CREATININE-BSD FRML MDRD: >60 ML/MIN/1.73M2
GLUCOSE SERPL-MCNC: 149 MG/DL (ref 70–99)
POTASSIUM SERPL-SCNC: 4.5 MMOL/L (ref 3.5–5.1)
SODIUM BLD-SCNC: 142 MMOL/L (ref 135–145)

## 2024-03-07 PROCEDURE — 36415 COLL VENOUS BLD VENIPUNCTURE: CPT

## 2024-03-07 PROCEDURE — 80048 BASIC METABOLIC PNL TOTAL CA: CPT

## 2024-03-26 ASSESSMENT — ENCOUNTER SYMPTOMS
DIARRHEA: 0
NAUSEA: 0

## 2024-03-28 ENCOUNTER — OFFICE VISIT (OUTPATIENT)
Dept: FAMILY MEDICINE CLINIC | Age: 29
End: 2024-03-28
Payer: COMMERCIAL

## 2024-03-28 VITALS
BODY MASS INDEX: 24.93 KG/M2 | SYSTOLIC BLOOD PRESSURE: 112 MMHG | OXYGEN SATURATION: 98 % | HEIGHT: 65 IN | WEIGHT: 149.6 LBS | DIASTOLIC BLOOD PRESSURE: 84 MMHG | HEART RATE: 92 BPM

## 2024-03-28 DIAGNOSIS — E10.22 TYPE 1 DIABETES MELLITUS WITH STAGE 1 CHRONIC KIDNEY DISEASE (HCC): ICD-10-CM

## 2024-03-28 DIAGNOSIS — F79 INTELLECTUAL DISABILITY: ICD-10-CM

## 2024-03-28 DIAGNOSIS — E10.649 HYPOGLYCEMIA UNAWARENESS ASSOCIATED WITH TYPE 1 DIABETES MELLITUS (HCC): ICD-10-CM

## 2024-03-28 DIAGNOSIS — Z97.8 USES SELF-APPLIED CONTINUOUS GLUCOSE MONITORING DEVICE: ICD-10-CM

## 2024-03-28 DIAGNOSIS — N18.1 TYPE 1 DIABETES MELLITUS WITH STAGE 1 CHRONIC KIDNEY DISEASE (HCC): ICD-10-CM

## 2024-03-28 DIAGNOSIS — E10.3521: Primary | ICD-10-CM

## 2024-03-28 DIAGNOSIS — E10.3522: ICD-10-CM

## 2024-03-28 LAB — HBA1C MFR BLD: 8.1 %

## 2024-03-28 PROCEDURE — 83036 HEMOGLOBIN GLYCOSYLATED A1C: CPT

## 2024-03-28 PROCEDURE — 2022F DILAT RTA XM EVC RTNOPTHY: CPT

## 2024-03-28 PROCEDURE — G8420 CALC BMI NORM PARAMETERS: HCPCS

## 2024-03-28 PROCEDURE — G8482 FLU IMMUNIZE ORDER/ADMIN: HCPCS

## 2024-03-28 PROCEDURE — 99214 OFFICE O/P EST MOD 30 MIN: CPT

## 2024-03-28 PROCEDURE — G8427 DOCREV CUR MEDS BY ELIG CLIN: HCPCS

## 2024-03-28 PROCEDURE — 1036F TOBACCO NON-USER: CPT

## 2024-03-28 PROCEDURE — 3052F HG A1C>EQUAL 8.0%<EQUAL 9.0%: CPT

## 2024-03-28 PROCEDURE — 95251 CONT GLUC MNTR ANALYSIS I&R: CPT

## 2024-03-28 NOTE — PROGRESS NOTES
Diabetes Mellitus Type II, Follow-Up Visit    CC: Patient for routine diabetes care, refills, CGM    HPI:  Estela Perales is a 28 y.o. female with a history of diabetes.  Patient did not bring a supportive person with her to her appt as previously instructed.  She has been advised that if she arrives in the future without an accompanying HIPAA approved, friend, family member, etc., she will be asked to reschedule and will not be seen.  I have significant concerns regarding the patient's ability to comprehend the severity of her chronic disease and her ability to properly care for herself independently which is why I am requiring her to be accompanied to her future appts. Current treatment includes:  70/30 insulin .  Current nutrition plan and level of physical activity includes: she is not currently working due to vision problems, she is trying to apply for SSD.  Current weight trend is described as; there was no change in patient's weight..  She has received diabetes education and/or dietitian consult previously.  Patient is currently monitoring glucose at home.  Glucose monitoring includes: continuous via CGM Sahil 2 .  Most recent A1C result is   Hemoglobin A1C   Date Value Ref Range Status   03/28/2024 8.1 % Final       PMH:   Encounter Diagnoses   Name Primary?    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

## 2024-04-29 ASSESSMENT — ENCOUNTER SYMPTOMS
NAUSEA: 0
DIARRHEA: 0

## 2024-05-06 ENCOUNTER — TELEPHONE (OUTPATIENT)
Dept: FAMILY MEDICINE CLINIC | Age: 29
End: 2024-05-06

## 2024-05-06 NOTE — TELEPHONE ENCOUNTER
Below is a copy and paste from staff messaging.  Certainly, this a most challenging case.  I applaud everyone for their word done thus far.  Shantel, how about  doing  telemedicine visits with Estela and a family member.  This may be more about educating the family than treating her.  Express your concerns and go from there.      It sounds like she could be legally blind, so there may be some resources available.  Her ophthalmologist's office might be able to helpl    Also, with some of my most difficult patients, I do weekly visits until I feel some progress is being made.    Let's keep the conversation going.                                          FW: Additional assistance needed  Received: Today  Nicole Abbott RN MacGillivray, Jessica, RN; Vivian Walker APRN - CNP  Cc: Roxana Mclaughlin MD  Good morning,    I am forwarding this message to Loren Milian, as she remains the ACM covering Dr. Mclaughlin's patients.    Thank you,    JESSICA Hoyos, RN  Ambulatory Care Manager  [phone]          Previous Messages       ----- Message -----  From: Vivian Walker APRN - CNP  Sent: 5/3/2024   1:58 PM EDT  To: Roxana Mclaughlin MD; Nicole Abbott RN  Subject: Additional assistance needed                    Dr. Mclaughlin,    I wanted to update you regarding your patient, Estela Perales.  As you know, Estela has intellectual disabilities but has been high functioning all things considered.  However, I am growing more concerned for her ability to make decisions regarding her health and daily care.  Her ability to reasonably comprehend the severity of her diabetes is very alarming.  Recently, I advised her to bring a support person with her to all future appointments, because she lacks the ability to comprehend and because her vision has gotten to the point that she literally can not see past her nose due to diabetic retinopathy and I wanted her to have someone at home to help with

## 2024-05-07 DIAGNOSIS — N18.1 TYPE 1 DIABETES MELLITUS WITH STAGE 1 CHRONIC KIDNEY DISEASE (HCC): ICD-10-CM

## 2024-05-07 DIAGNOSIS — E10.22 TYPE 1 DIABETES MELLITUS WITH STAGE 1 CHRONIC KIDNEY DISEASE (HCC): ICD-10-CM

## 2024-05-07 DIAGNOSIS — E10.42 TYPE 1 DIABETES MELLITUS WITH DIABETIC POLYNEUROPATHY (HCC): ICD-10-CM

## 2024-05-08 ENCOUNTER — CARE COORDINATION (OUTPATIENT)
Dept: CARE COORDINATION | Age: 29
End: 2024-05-08

## 2024-05-08 ENCOUNTER — TELEPHONE (OUTPATIENT)
Dept: FAMILY MEDICINE CLINIC | Age: 29
End: 2024-05-08

## 2024-05-08 NOTE — TELEPHONE ENCOUNTER
Per Dr. See, refill ASA, Atorvastatin, Carvedilol, ezetimibe, NTG.    Received call from patient that her CGM is not working.  This is the new reader & sensor that I just put on her on Friday 5/3.  She states she has tried to use a traditional glucometer, but it is also not working.  She is not able to tell me what the error codes or what the machines are doing exactly, so I can not trouble shoot anything for her over the phone.  I offered her to come to the office now with a support person with both of her devices so that I can try to assist her.  Patient agreed that she would come into the office now to be seen.  After waiting approximately 30 minutes for the patient to arrive, I received a call back from the patient stating she would not be coming in because she can't find anyone to bring her.  I asked her again if anyone was home that I could speak with that could assist her & she stated that her cousin was there but \"he doesn't know anything about these readers\".  I strongly warned patient about possibilities of complications resulting from not checking her glucose levels and not taking her insulin correctly up to and including the possibility of diabetic coma and death.  Her plan is to find someone that can take her to the pharmacy later to see if they can figure out what is wrong with her devices.

## 2024-05-08 NOTE — CARE COORDINATION
Call to pt for acm outreach. Reports she Lives w grandma. Pt just had eye surgery she reports on October 23 and had another on 4/18. She goes back for f/u 5/17 dr kristin andrea. Does not drive, dad takes to apptmts out of town, cousin takes in [place]. Reports her Grandma's last day of work is this friday (goes back to work in August) and can assist. Has cgm \"sensor\" on but reports it is not letting her check her sugar. Tried to use other meter but not working. Planned to go to pharmacy to possibly buy new meter. Advised to check with diabetic educator to troubleshoot or speak to pharmacist if possible for assistance prior to buying new. Pt reports she has loan [employer] ed phone number and is able to call her to contact her. Advised pt of upcoming apptmts as unable to see text reminders. Verbalizes plan for grandma to assist her with visits.  ACM will continue to follow.

## 2024-05-08 NOTE — TELEPHONE ENCOUNTER
I thought about that , but you have to document that she is homebound in the order.  If you can get that to work, great.

## 2024-05-10 ENCOUNTER — CARE COORDINATION (OUTPATIENT)
Dept: CARE COORDINATION | Age: 29
End: 2024-05-10

## 2024-05-10 NOTE — CARE COORDINATION
Date of referral: 5/8/24  Referral received from: ELLA Milian  Reason for referral: diabetic supplies / social support - barrier    Attempted phone call to Pt. No answer, vm message left introducing self and requesting return call, contact number provided.     ALPA plan of care: SW will make next outreach attempt on 5/14

## 2024-05-14 ENCOUNTER — CARE COORDINATION (OUTPATIENT)
Dept: CARE COORDINATION | Age: 29
End: 2024-05-14

## 2024-05-14 NOTE — CARE COORDINATION
Work Plan:  SW will assist Pt in contacting [facility] to schedule transport for upcoming medical appointments as needed.     Next Steps: SW will follow up with Pt on 5/15 regarding transportation to upcoming eye appointment scheduled for 5/17    Method of Communication With Provider (if appropriate): Chart Routing       Goals Addressed                      This Visit's Progress      Patient Stated (pt-stated)         Pt identified need for transportation to medical appointments    Barriers: financial, lack of support, overwhelmed by complexity of regimen, stress, and time constraints  Plan for overcoming my barriers: Pt's family and this SW will assist Pt in utilizing community resources for transportation  Confidence: 9/10  Anticipated Goal Completion Date: 8/14/24

## 2024-05-15 ENCOUNTER — CARE COORDINATION (OUTPATIENT)
Dept: CARE COORDINATION | Age: 29
End: 2024-05-15

## 2024-05-15 DIAGNOSIS — E10.22 TYPE 1 DIABETES MELLITUS WITH STAGE 1 CHRONIC KIDNEY DISEASE (HCC): ICD-10-CM

## 2024-05-15 DIAGNOSIS — E10.42 TYPE 1 DIABETES MELLITUS WITH DIABETIC POLYNEUROPATHY (HCC): ICD-10-CM

## 2024-05-15 DIAGNOSIS — N18.1 TYPE 1 DIABETES MELLITUS WITH STAGE 1 CHRONIC KIDNEY DISEASE (HCC): ICD-10-CM

## 2024-05-15 NOTE — CARE COORDINATION
Patient Current Location: Home: [address] [place] [id number]     Phone call to Pt who confirmed her Gdpa will take patient to appointment on 5/17.     ALPA plan of care: SW will make next outreach to Pt to follow up regarding transportation on 5/29

## 2024-05-20 ENCOUNTER — TELEPHONE (OUTPATIENT)
Dept: FAMILY MEDICINE CLINIC | Age: 29
End: 2024-05-20

## 2024-05-20 DIAGNOSIS — N18.1 TYPE 1 DIABETES MELLITUS WITH STAGE 1 CHRONIC KIDNEY DISEASE (HCC): Primary | ICD-10-CM

## 2024-05-20 DIAGNOSIS — E10.22 TYPE 1 DIABETES MELLITUS WITH STAGE 1 CHRONIC KIDNEY DISEASE (HCC): Primary | ICD-10-CM

## 2024-05-20 RX ORDER — BLOOD PRESSURE TEST KIT
KIT MISCELLANEOUS
Qty: 150 EACH | Refills: 11 | Status: SHIPPED | OUTPATIENT
Start: 2024-05-20

## 2024-05-20 RX ORDER — LANCETS 28 GAUGE
EACH MISCELLANEOUS
Qty: 150 EACH | Refills: 11 | Status: SHIPPED | OUTPATIENT
Start: 2024-05-20

## 2024-05-20 RX ORDER — LANCING DEVICE
EACH MISCELLANEOUS
Qty: 1 EACH | Refills: 0 | Status: SHIPPED | OUTPATIENT
Start: 2024-05-20

## 2024-05-20 RX ORDER — GLUCOSAMINE HCL/CHONDROITIN SU 500-400 MG
CAPSULE ORAL
Qty: 150 STRIP | Refills: 11 | Status: SHIPPED | OUTPATIENT
Start: 2024-05-20

## 2024-05-20 NOTE — TELEPHONE ENCOUNTER
Received fax request from [facility] on [facility] pharmacy requesting orders for new glucometer & testing supplies to be used when CGM is unavailable.

## 2024-05-28 ENCOUNTER — OFFICE VISIT (OUTPATIENT)
Dept: FAMILY MEDICINE CLINIC | Age: 29
End: 2024-05-28
Payer: COMMERCIAL

## 2024-05-28 VITALS
BODY MASS INDEX: 23.99 KG/M2 | OXYGEN SATURATION: 98 % | DIASTOLIC BLOOD PRESSURE: 82 MMHG | WEIGHT: 144 LBS | SYSTOLIC BLOOD PRESSURE: 120 MMHG | HEIGHT: 65 IN | HEART RATE: 79 BPM

## 2024-05-28 DIAGNOSIS — E10.649 HYPOGLYCEMIA UNAWARENESS ASSOCIATED WITH TYPE 1 DIABETES MELLITUS (HCC): ICD-10-CM

## 2024-05-28 DIAGNOSIS — E10.3521: ICD-10-CM

## 2024-05-28 DIAGNOSIS — E10.42 TYPE 1 DIABETES MELLITUS WITH DIABETIC POLYNEUROPATHY (HCC): ICD-10-CM

## 2024-05-28 DIAGNOSIS — Z97.8 USES SELF-APPLIED CONTINUOUS GLUCOSE MONITORING DEVICE: ICD-10-CM

## 2024-05-28 DIAGNOSIS — F79 INTELLECTUAL DISABILITY: ICD-10-CM

## 2024-05-28 DIAGNOSIS — N18.1 TYPE 1 DIABETES MELLITUS WITH STAGE 1 CHRONIC KIDNEY DISEASE (HCC): Primary | ICD-10-CM

## 2024-05-28 DIAGNOSIS — E10.22 TYPE 1 DIABETES MELLITUS WITH STAGE 1 CHRONIC KIDNEY DISEASE (HCC): Primary | ICD-10-CM

## 2024-05-28 DIAGNOSIS — E10.3522: ICD-10-CM

## 2024-05-28 PROCEDURE — G8427 DOCREV CUR MEDS BY ELIG CLIN: HCPCS

## 2024-05-28 PROCEDURE — 2022F DILAT RTA XM EVC RTNOPTHY: CPT

## 2024-05-28 PROCEDURE — 1036F TOBACCO NON-USER: CPT

## 2024-05-28 PROCEDURE — 3052F HG A1C>EQUAL 8.0%<EQUAL 9.0%: CPT

## 2024-05-28 PROCEDURE — G8420 CALC BMI NORM PARAMETERS: HCPCS

## 2024-05-28 PROCEDURE — 95251 CONT GLUC MNTR ANALYSIS I&R: CPT

## 2024-05-28 PROCEDURE — 99214 OFFICE O/P EST MOD 30 MIN: CPT

## 2024-05-29 ENCOUNTER — CARE COORDINATION (OUTPATIENT)
Dept: CARE COORDINATION | Age: 29
End: 2024-05-29

## 2024-05-29 NOTE — CARE COORDINATION
Attempted phone call to Pt to follow up regarding transportation to medical appointments. No answer, vm message left requesting return call, contact number provided.     ALPA plan of care: SW will make next outreach attempt on 6/10 to follow up regarding transportation.

## 2024-05-30 ENCOUNTER — CARE COORDINATION (OUTPATIENT)
Dept: CARE COORDINATION | Age: 29
End: 2024-05-30

## 2024-06-03 RX ORDER — BLOOD SUGAR DIAGNOSTIC
1 STRIP MISCELLANEOUS
Qty: 120 EACH | Refills: 5 | Status: SHIPPED | OUTPATIENT
Start: 2024-06-03

## 2024-06-03 ASSESSMENT — ENCOUNTER SYMPTOMS
NAUSEA: 0
DIARRHEA: 0

## 2024-06-06 ENCOUNTER — CARE COORDINATION (OUTPATIENT)
Dept: CARE COORDINATION | Age: 29
End: 2024-06-06

## 2024-06-06 ENCOUNTER — OFFICE VISIT (OUTPATIENT)
Dept: FAMILY MEDICINE CLINIC | Age: 29
End: 2024-06-06
Payer: COMMERCIAL

## 2024-06-06 VITALS — BODY MASS INDEX: 23.96 KG/M2 | HEIGHT: 65 IN

## 2024-06-06 DIAGNOSIS — E10.3522: ICD-10-CM

## 2024-06-06 DIAGNOSIS — F79 INTELLECTUAL DISABILITY: ICD-10-CM

## 2024-06-06 DIAGNOSIS — E10.3521: Primary | ICD-10-CM

## 2024-06-06 DIAGNOSIS — E10.65 HYPERGLYCEMIA DUE TO TYPE 1 DIABETES MELLITUS (HCC): ICD-10-CM

## 2024-06-06 PROCEDURE — 1036F TOBACCO NON-USER: CPT

## 2024-06-06 PROCEDURE — G8427 DOCREV CUR MEDS BY ELIG CLIN: HCPCS

## 2024-06-06 PROCEDURE — 99213 OFFICE O/P EST LOW 20 MIN: CPT

## 2024-06-06 PROCEDURE — 2022F DILAT RTA XM EVC RTNOPTHY: CPT

## 2024-06-06 PROCEDURE — G8420 CALC BMI NORM PARAMETERS: HCPCS

## 2024-06-06 PROCEDURE — 3052F HG A1C>EQUAL 8.0%<EQUAL 9.0%: CPT

## 2024-06-06 ASSESSMENT — ENCOUNTER SYMPTOMS
EYE DISCHARGE: 1
DIARRHEA: 0
NAUSEA: 0

## 2024-06-06 NOTE — PROGRESS NOTES
Estela Perales (:  1995) is a 28 y.o. female,Established patient, here for evaluation of the following chief complaint(s):  Other (Check her sensor)      Assessment & Plan   1. 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 Type 1 diabetes mellitus with left eye affected by proliferative retinopathy and traction retinal detachment involving macula (HCC)  3. Intellectual disability  4. Hyperglycemia due to type 1 diabetes mellitus (HCC)    Pt is shown how to use the OneTouch Verio glucometer she has with her today.  We obtained a glucose reading of 404 during the demonstration.  Patient verbalized that she last used insulin yesterday, has not eaten yet today or checked her glucose levels in a couple of days.    Return in about 3 weeks (around 2024).       Subjective   Patient presents with a new glucose meter stating that she is unable to get it to work.  She did not bring an additional learner with her today, despite previous instructions to always have a support person with her.          Review of Systems   Eyes:  Positive for discharge and visual disturbance.   Cardiovascular:  Negative for chest pain.   Gastrointestinal:  Negative for diarrhea and nausea.   Endocrine: Negative for polydipsia, polyphagia and polyuria.   Genitourinary:  Negative for difficulty urinating and urgency.   Skin:  Negative for wound.          Objective   Physical Exam  Vitals reviewed.   Constitutional:       General: She is not in acute distress.  Eyes:      General: Visual field deficit present.         Left eye: Discharge present.  Pulmonary:      Effort: Pulmonary effort is normal.   Skin:     General: Skin is warm and dry.      Findings: No wound.   Neurological:      Mental Status: She is alert and oriented to person, place, and time.          On this date 2024 I have spent 20 minutes reviewing previous notes, test

## 2024-06-06 NOTE — CARE COORDINATION
Patient Current Location: Home: [address] [address]     Phone call to Pt to discuss transportation.  Discussed options of Care    Merged call to [facility] transportation and spoke with April.     Scheduled transport for the following appointments:   6/27/24 - 1pm - diabetes follow up with Vivian Walker - will be picked up at 2:30pm - confirmation #: [id number]  6/28/24 - podiatrist - 3pm - Dr. Sanches - confirmation #: [id number]  7/1 - Dr. Roxana Mclaughlin @ 3:15pm - confirmation #: [id number]    SW reiterated to Pt that she needs to be ready 1 hour in advance for transport.     SW discussed plan to call Pt with reminder for upcoming appointments. Pt reported her phone will be shut off this evening.     SW made another call to Pt to ask if there is another family member we could try to call to reach her when her phone is shut off. No answer, vm message left.     ALPA plan of care: ALPA will make next outreach to Pt on 6/26 to provide appointment reminder.

## 2024-06-06 NOTE — CARE COORDINATION
Ambulatory Care Coordination Note  2024    Patient Current Location:  [place]    ACM contacted the patient by telephone. Verified name and  with patient as identifiers. Provided introduction to self, and explanation of the ACM role.     ACM: Loren Cochran RN    Challenges to be reviewed by the provider   Additional needs identified to be addressed with provider: No  none               Method of communication with provider: none.        Offered patient enrollment in the Remote Patient Monitoring (RPM) program for in-home monitoring: Patient is not eligible for RPM program because: insurance coverage.    Ambulatory Care Coordination Assessment    Care Coordination Protocol  Referral from Primary Care Provider: No  Week 1 - Initial Assessment     Do you have all of your prescriptions and are they filled?: Yes  Barriers to medication adherence: None  Are you able to afford your medications?: Yes  How often do you have trouble taking your medications the way you have been told to take them?: I always take them as prescribed.     Do you have Home O2 Therapy?: No      Ability to seek help/take action for Emergent Urgent situations i.e. fire, crime, inclement weather or health crisis.: Independent  Ability to ambulate to restroom: Independent  Ability handle personal hygeine needs (bathing/dressing/grooming): Independent  Ability to manage Medications: Needs Assistance  Ability to prepare Food Preparation: Independent  Ability to maintain home (clean home, laundry): Independent  Ability to drive and/or has transportation: Needs Assistance  Ability to do shopping: Independent  Ability to manage finances: Needs Assistance     Current Housing: Private Residence  Who do you live with?: Grandparent(s)  Are you an active caregiver in your home?: No        Per the Fall Risk Screening, did the patient have 2 or more falls or 1 fall with injury in the past year?: No     Frequent urination at night?: No  Do you use

## 2024-06-26 ENCOUNTER — CARE COORDINATION (OUTPATIENT)
Dept: CARE COORDINATION | Age: 29
End: 2024-06-26

## 2024-06-26 NOTE — CARE COORDINATION
Attempted phone call to Pt to provide appointment reminder for 6/27. The phone did not ring, SW did leave vm message requesting return call, contact number provided.     ALPA plan of care: ALPA will make next outreach attempt on 7/16

## 2024-06-28 ENCOUNTER — TELEPHONE (OUTPATIENT)
Dept: FAMILY MEDICINE CLINIC | Age: 29
End: 2024-06-28

## 2024-06-28 NOTE — TELEPHONE ENCOUNTER
Called patient to confirm what was the reason she was coming in on Monday for says mara, but she sees Shantel for that.  No answer

## 2024-07-02 ENCOUNTER — CARE COORDINATION (OUTPATIENT)
Dept: CARE COORDINATION | Age: 29
End: 2024-07-02

## 2024-07-02 SDOH — HEALTH STABILITY: PHYSICAL HEALTH: ON AVERAGE, HOW MANY MINUTES DO YOU ENGAGE IN EXERCISE AT THIS LEVEL?: 0 MIN

## 2024-07-02 SDOH — HEALTH STABILITY: PHYSICAL HEALTH: ON AVERAGE, HOW MANY DAYS PER WEEK DO YOU ENGAGE IN MODERATE TO STRENUOUS EXERCISE (LIKE A BRISK WALK)?: 0 DAYS

## 2024-07-02 NOTE — CARE COORDINATION
Ambulatory Care Coordination Note       Patient Current Location:  Home: [address] [address]     ACM contacted the patient by telephone. Verified name and  with patient as identifiers.         ACM: Loren Cochran RN     Challenges to be reviewed by the provider   Additional needs identified to be addressed with provider No  none               Method of communication with provider: none.    Care Summary Note: Call to pt for acm f/u. Reports her Ride didn't show up yesterday to take her to pcp visit. She does not have number to call them and has trouble at times seeing phone to dial out. Cousin going to take her to pcp ov tmw. Grandma helping her at home with blood sugar and insulin dosing. Pt denies needs or symptoms at this time. Advised to call with any needs. Reminded of same day apptmt availability. Has acm contact info if needs arise. Will plan to grad over next 1-2 encounters.      Offered patient enrollment in the Remote Patient Monitoring (RPM) program for in-home monitoring: Patient is not eligible for RPM program because: insurance coverage.     Assessments Completed:   Diabetes Assessment      Meal Planning: Avoidance of concentrated sweets   How often do you test your blood sugar?: Daily, Meals   Do you have barriers with adherence to non-pharmacologic self-management interventions? (Nutrition/Exercise/Self-Monitoring): Yes       No patient-reported symptoms          and   General Assessment    Do you have any symptoms that are causing concern?: No          Medications Reviewed:   Patient denies any changes with medications and reports taking all medications as prescribed.    Advance Care Planning:   Not reviewed during this call     Care Planning:   Education Documentation  No documentation found.  Education Comments  No comments found.     ,    Goals Addressed                      This Visit's Progress      Patient Stated (pt-stated)   On track      Pt identified need for

## 2024-07-03 ENCOUNTER — OFFICE VISIT (OUTPATIENT)
Dept: FAMILY MEDICINE CLINIC | Age: 29
End: 2024-07-03
Payer: COMMERCIAL

## 2024-07-03 VITALS
BODY MASS INDEX: 24.38 KG/M2 | DIASTOLIC BLOOD PRESSURE: 66 MMHG | OXYGEN SATURATION: 98 % | HEART RATE: 66 BPM | WEIGHT: 146.52 LBS | SYSTOLIC BLOOD PRESSURE: 112 MMHG

## 2024-07-03 DIAGNOSIS — Z23 NEED FOR PNEUMOCOCCAL VACCINE: ICD-10-CM

## 2024-07-03 DIAGNOSIS — E10.42 TYPE 1 DIABETES MELLITUS WITH DIABETIC POLYNEUROPATHY (HCC): Primary | ICD-10-CM

## 2024-07-03 DIAGNOSIS — R80.9 MICROALBUMINURIA: ICD-10-CM

## 2024-07-03 PROCEDURE — G8427 DOCREV CUR MEDS BY ELIG CLIN: HCPCS | Performed by: FAMILY MEDICINE

## 2024-07-03 PROCEDURE — 1036F TOBACCO NON-USER: CPT | Performed by: FAMILY MEDICINE

## 2024-07-03 PROCEDURE — 3052F HG A1C>EQUAL 8.0%<EQUAL 9.0%: CPT | Performed by: FAMILY MEDICINE

## 2024-07-03 PROCEDURE — 90677 PCV20 VACCINE IM: CPT | Performed by: FAMILY MEDICINE

## 2024-07-03 PROCEDURE — G8420 CALC BMI NORM PARAMETERS: HCPCS | Performed by: FAMILY MEDICINE

## 2024-07-03 PROCEDURE — 99213 OFFICE O/P EST LOW 20 MIN: CPT | Performed by: FAMILY MEDICINE

## 2024-07-03 PROCEDURE — 2022F DILAT RTA XM EVC RTNOPTHY: CPT | Performed by: FAMILY MEDICINE

## 2024-07-03 PROCEDURE — 90471 IMMUNIZATION ADMIN: CPT | Performed by: FAMILY MEDICINE

## 2024-07-03 NOTE — PROGRESS NOTES
daily (with meals and nightly) 120 each 5    blood glucose monitor kit and supplies Dispense 1 glucometer starter kit for blood sugar testing 4 times daily. 1 kit 0    blood glucose monitor strips Test blood sugars 4 times a day 150 strip 11    Aimsco Ultra Thin Lancets MISC For blood sugar testing 4 times per day. 150 each 11    Alcohol Swabs PADS Use as directed for blood sugar testing 4 times per day. 150 each 11    Lancet Devices (ADJUSTABLE LANCING DEVICE) MISC Use as directed for blood glucose monitoring. 1 each 0    INSULIN SYRINGE .5CC/29G ([employer] INS SYR .5CC/29G) 29G X 1/2\" 0.5 ML MISC 1 each by Does not apply route 2 times daily 200 each 11    Elastic Bandages & Supports (JOBST KNEE HIGH COMPRESSION SM) MISC 1 each by Does not apply route daily as needed (edema legs) 2 each 2    Lancets MISC 1 each by Does not apply route 3 times daily 300 each 2     No current facility-administered medications on file prior to visit.                   Objective:           Physical Exam  Vitals and nursing note reviewed.   Constitutional:       Appearance: She is well-developed.   HENT:      Head: Normocephalic and atraumatic.   Cardiovascular:      Rate and Rhythm: Normal rate and regular rhythm.      Heart sounds: Normal heart sounds, S1 normal and S2 normal.   Pulmonary:      Effort: Pulmonary effort is normal. No respiratory distress.      Breath sounds: Normal breath sounds. No wheezing.   Musculoskeletal:      Cervical back: Neck supple.   Skin:     General: Skin is warm and dry.   Neurological:      Mental Status: She is alert.       Vitals:    07/03/24 1519   BP: 112/66   Site: Left Upper Arm   Position: Sitting   Cuff Size: Medium Adult   Pulse: 66   SpO2: 98%   Weight: 66.5 kg (146 lb 8.3 oz)     Body mass index is 24.38 kg/m².     Wt Readings from Last 3 Encounters:   07/03/24 66.5 kg (146 lb 8.3 oz)   05/28/24 65.3 kg (144 lb)   03/28/24 67.9 kg (149 lb 9.6 oz)     BP Readings from Last 3 Encounters:

## 2024-07-03 NOTE — PATIENT INSTRUCTIONS
NEW OFFICE HOURS: M-TH 7AM-5PM  BRING YOUR MEDICATION BOTTLES TO ALL APPOINTMENTS    Check MY CHART for test results.  If you have forgotten your password, call [phone].  The diagnoses and medications listed in this after visit summary may not be up to date.  Check MY CHART in 28-48 hours for corrections.      Late cancellation policy: So that we can better accommodate people who are sick, please give our office 24 hour notice for an appointment cancellation.    Missed appointments: Your care is very important to us.  It is important that you keep your scheduled appointments.   Multiple missed appointments will lead to a dismissal from the office.    Patients arriving late will be worked into the schedule as time permits, with patients arriving on time taken as scheduled. Late arriving patients are more than welcome to wait or reschedule their appointments.    Please allow 5-7 business days for paperwork to be processed.

## 2024-07-04 LAB
CREAT UR-MCNC: 233 MG/DL (ref 28–259)
MICROALBUMIN UR DL<=1MG/L-MCNC: 4 MG/DL
MICROALBUMIN/CREAT UR: 17.2 MG/G (ref 0–30)

## 2024-07-08 ENCOUNTER — CARE COORDINATION (OUTPATIENT)
Dept: CARE COORDINATION | Age: 29
End: 2024-07-08

## 2024-07-08 NOTE — CARE COORDINATION
Patient Current Location: Home: [address] [facility] [address]     Received message from [facility] stating Pt reported transportation no showed for her 7/1 appointment. Pt reported she forgot that foot dr appiram was on 26, thought it was on 28th and she no showed transport on both 6/27 and 6/28. SW did provide education to Pt that no shows count against her and if she has one more, they will refuse to provide transport in the future. SW discussed plan to send her transportation and this SW information in large print.    Pt reported she can ask her cousin to take her on 7/31 appointment. SW will follow up with Pt on 7/26 to confirm she has transportation and if not this SW will assist her in arranging through [facility].     Secure email sent to coworker, Darleen TREVIZO requesting she sends (large print) letter to Pt via postal mail with transportation phone number, this SW contact number and her member ID number.        ALPA plan of care: SW will follow up with Pt regarding transportation on 7/26.

## 2024-07-10 ENCOUNTER — CARE COORDINATION (OUTPATIENT)
Dept: CARE COORDINATION | Age: 29
End: 2024-07-10

## 2024-07-15 ENCOUNTER — CARE COORDINATION (OUTPATIENT)
Dept: CARE COORDINATION | Age: 29
End: 2024-07-15

## 2024-07-15 NOTE — CARE COORDINATION
Ambulatory Care Coordination Note     7/15/2024 2:59 PM     Patient Current Location:  Home: [address] [place], [place] [id number]     Patient graduated from the High Risk Care Management program on 7/15/2024.  Patient reinforced resources provided during this care transition period..  Care management goals have been completed. No further Ambulatory Care Manager follow up scheduled.      ACM: Loren Cochran RN     Challenges to be reviewed by the provider   Additional needs identified to be addressed with provider No  none               Method of communication with provider: none.    Care Summary Note: Call to pt for acm f/u. Discussed upcoming dm np apptmt and reports that she will likely have cousin transport/accompany her but may need to utilize transpo. Discussed transpo resources and that info also being sent by mail by [employer] work and sw plan to f/u on receipt and prior to ov. Pt denies needs or symptoms at this time and acm will plan to grad at this time. Has acm contact info if needs arise.     Offered patient enrollment in the Remote Patient Monitoring (RPM) program for in-home monitoring: Patient is not eligible for RPM program because: insurance coverage.     Assessments Completed:   General Assessment              Medications Reviewed:   Patient denies any changes with medications and reports taking all medications as prescribed.        PCP/Specialist follow up:   Future Appointments         Provider Specialty Dept Phone    7/31/2024 1:00 PM Vivian Walker APRN - CNP Family Medicine [phone]    4/3/2025 3:15 PM Roxana Mclaughlin MD Family Medicine [phone]            Follow Up:   No further Ambulatory Care Management follow-up scheduled at this time.  Patient  has Ambulatory Care Manager's contact information for any further questions, concerns or needs.

## 2024-07-26 ENCOUNTER — CARE COORDINATION (OUTPATIENT)
Dept: CARE COORDINATION | Age: 29
End: 2024-07-26

## 2024-07-26 NOTE — CARE COORDINATION
Phone call to Pt to follow up regarding transportation. Pt confirmed she received a letter from this SW, but she has not read it yet.     Pt was not sure if her cousin could provide transport to upcoming 7/31 appointment, stating she believes she will need it to be arranged through [facility]. SW offered to call to get it set up. Pt was in agreement.     Phone call to Anatoliy with [facility] transportation (provide a ride) to arrange transportation for 7/31 appointment scheduled for 1pm with Vivian Walker.  12:15  time, but  can arrive between 12 and 12:30pm,  will only wait 5 minutes  Please call to cancel  Pt will call when ready to be picked up  Confirmation number: [phone]    Phone call to Pt to provide update. Pt is requesting appt reminder.     ALPA plan of care: SW will contact Pt on 7/30 to provide reminder of appointment scheduled for 7/31.

## 2024-07-30 ENCOUNTER — CARE COORDINATION (OUTPATIENT)
Dept: CARE COORDINATION | Age: 29
End: 2024-07-30

## 2024-07-30 NOTE — CARE COORDINATION
Patient Current Location: Home: [address] [place] [address]     Phone call to Pt to provide appointment reminder. Pt denied any other questions or needs at this time.     SW plan of care: SW will resolve from SW services due to no current needs.

## 2024-08-01 DIAGNOSIS — E10.42 TYPE 1 DIABETES MELLITUS WITH DIABETIC POLYNEUROPATHY (HCC): ICD-10-CM

## 2024-08-01 DIAGNOSIS — E10.22 TYPE 1 DIABETES MELLITUS WITH STAGE 1 CHRONIC KIDNEY DISEASE (HCC): ICD-10-CM

## 2024-08-01 DIAGNOSIS — N18.1 TYPE 1 DIABETES MELLITUS WITH STAGE 1 CHRONIC KIDNEY DISEASE (HCC): ICD-10-CM

## 2024-08-05 ENCOUNTER — CARE COORDINATION (OUTPATIENT)
Dept: CARE COORDINATION | Age: 29
End: 2024-08-05

## 2024-08-05 NOTE — CARE COORDINATION
Patient Current Location: Home: [address] [facility] [address]     Phone call to Pt to discuss transport for upcoming appointment scheduled for 8/8    Merged call to [facility] transport to schedule for 8/8 @ 3pm with Vivian Walker for diabetes follow up.  Confirmation #: [id number]    ALPA discussed with Pt the plan is to allow Pt to become independent with scheduling transport. Pt reported her vision does not allow her to see smaller print. SW offered to send Pt a list of her physician's names, addresses, phone numbers along with her [facility] ID so she can schedule these on her own in the future. Pt was in agreement.     ALPA plan of care: ALPA will follow up with Pt regarding transportation on 8/21

## 2024-08-06 DIAGNOSIS — E10.42 TYPE 1 DIABETES MELLITUS WITH DIABETIC POLYNEUROPATHY (HCC): ICD-10-CM

## 2024-08-06 DIAGNOSIS — N18.1 TYPE 1 DIABETES MELLITUS WITH STAGE 1 CHRONIC KIDNEY DISEASE (HCC): ICD-10-CM

## 2024-08-06 DIAGNOSIS — E10.22 TYPE 1 DIABETES MELLITUS WITH STAGE 1 CHRONIC KIDNEY DISEASE (HCC): ICD-10-CM

## 2024-08-07 RX ORDER — BLOOD PRESSURE TEST KIT
KIT MISCELLANEOUS
Qty: 150 EACH | Refills: 11 | Status: SHIPPED | OUTPATIENT
Start: 2024-08-07

## 2024-08-08 ENCOUNTER — OFFICE VISIT (OUTPATIENT)
Dept: FAMILY MEDICINE CLINIC | Age: 29
End: 2024-08-08
Payer: COMMERCIAL

## 2024-08-08 ENCOUNTER — CARE COORDINATION (OUTPATIENT)
Dept: CARE COORDINATION | Age: 29
End: 2024-08-08

## 2024-08-08 VITALS
HEART RATE: 87 BPM | DIASTOLIC BLOOD PRESSURE: 60 MMHG | WEIGHT: 153 LBS | SYSTOLIC BLOOD PRESSURE: 104 MMHG | BODY MASS INDEX: 25.46 KG/M2 | OXYGEN SATURATION: 97 %

## 2024-08-08 DIAGNOSIS — R80.9 TYPE 1 DIABETES MELLITUS WITH MICROALBUMINURIA (HCC): ICD-10-CM

## 2024-08-08 DIAGNOSIS — F79 INTELLECTUAL DISABILITY: ICD-10-CM

## 2024-08-08 DIAGNOSIS — E10.29 TYPE 1 DIABETES MELLITUS WITH MICROALBUMINURIA (HCC): ICD-10-CM

## 2024-08-08 DIAGNOSIS — E10.22 TYPE 1 DIABETES MELLITUS WITH STAGE 1 CHRONIC KIDNEY DISEASE (HCC): Primary | ICD-10-CM

## 2024-08-08 DIAGNOSIS — Z97.8 USES SELF-APPLIED CONTINUOUS GLUCOSE MONITORING DEVICE: ICD-10-CM

## 2024-08-08 DIAGNOSIS — N18.1 TYPE 1 DIABETES MELLITUS WITH STAGE 1 CHRONIC KIDNEY DISEASE (HCC): Primary | ICD-10-CM

## 2024-08-08 DIAGNOSIS — E10.649 HYPOGLYCEMIA UNAWARENESS ASSOCIATED WITH TYPE 1 DIABETES MELLITUS (HCC): ICD-10-CM

## 2024-08-08 LAB — HBA1C MFR BLD: 8.6 %

## 2024-08-08 PROCEDURE — 1036F TOBACCO NON-USER: CPT

## 2024-08-08 PROCEDURE — 2022F DILAT RTA XM EVC RTNOPTHY: CPT

## 2024-08-08 PROCEDURE — 99214 OFFICE O/P EST MOD 30 MIN: CPT

## 2024-08-08 PROCEDURE — 3052F HG A1C>EQUAL 8.0%<EQUAL 9.0%: CPT

## 2024-08-08 PROCEDURE — G8419 CALC BMI OUT NRM PARAM NOF/U: HCPCS

## 2024-08-08 PROCEDURE — 95251 CONT GLUC MNTR ANALYSIS I&R: CPT

## 2024-08-08 PROCEDURE — G8427 DOCREV CUR MEDS BY ELIG CLIN: HCPCS

## 2024-08-08 PROCEDURE — 83036 HEMOGLOBIN GLYCOSYLATED A1C: CPT

## 2024-08-08 SDOH — ECONOMIC STABILITY: INCOME INSECURITY
HOW HARD IS IT FOR YOU TO PAY FOR THE VERY BASICS LIKE FOOD, HOUSING, MEDICAL CARE, AND HEATING?: PATIENT UNABLE TO ANSWER

## 2024-08-08 SDOH — ECONOMIC STABILITY: FOOD INSECURITY
WITHIN THE PAST 12 MONTHS, YOU WORRIED THAT YOUR FOOD WOULD RUN OUT BEFORE YOU GOT MONEY TO BUY MORE.: PATIENT UNABLE TO ANSWER

## 2024-08-08 SDOH — ECONOMIC STABILITY: FOOD INSECURITY
WITHIN THE PAST 12 MONTHS, THE FOOD YOU BOUGHT JUST DIDN'T LAST AND YOU DIDN'T HAVE MONEY TO GET MORE.: PATIENT UNABLE TO ANSWER

## 2024-08-08 NOTE — PROGRESS NOTES
Estela Perales (:  1995) is a 29 y.o. female,Established patient, here for evaluation of the following chief complaint(s):  Diabetes      Assessment & Plan   1. Type 1 diabetes mellitus with stage 1 chronic kidney disease (HCC)  -     POCT glycosylated hemoglobin (Hb A1C)  2. Type 1 diabetes mellitus with microalbuminuria (HCC)  3. Hypoglycemia unawareness associated with type 1 diabetes mellitus (HCC)  4. Intellectual disability  - microalbumin levels are improving  T1DM remains grossly uncontrolled, a significant portion of this visit was spent in reviewing the continued risks & complications with the patient & her grandmother.  We did also review the benefits of an insulin pump, the patient & grandmother would like to proceed with inquiring about the OmniPod 5.    No follow-ups on file.       Subjective   Diabetes  She presents for her follow-up diabetic visit. She has type 1 diabetes mellitus. Her disease course has been worsening. Hypoglycemia symptoms include hunger. (Pt denies symptoms of hypoglycemia during most occurrences  ) Associated symptoms include blurred vision, foot paresthesias and visual change. Pertinent negatives for diabetes include no chest pain, no polydipsia, no polyphagia and no polyuria. Hypoglycemia complications include nocturnal hypoglycemia. Diabetic symptom progression: unchanged. Diabetic complications include nephropathy, peripheral neuropathy and retinopathy. Risk factors for coronary artery disease include diabetes mellitus, family history and sedentary lifestyle. Current diabetic treatment includes insulin injections. She is compliant with treatment some of the time. Diabetic meal planning: pt struggles with carb counting, reading food labels. Her home blood glucose trend is fluctuating dramatically.       Key Antihyperglycemic Medications               insulin aspart prot & aspart (NOVOLOG 70/30) injection pen (Taking) Inject 18 Units into the skin daily (with  Interpretation:    Time in range () is not at target of 70%.  Percent of variability at 45.9% indicates that glycemic control is not stable.  Time spent in hypoglycemia range (<70)/hypoglycemic events is not at target.  Hypoglycemia is occurring: at nighttime during hours of sleep  Time spent in hyperglycemia range (>180) is not at target.  Hyperglycemia is occurring: in the afternoon in the evening      On this date 8/8/2024 I have spent 38 minutes reviewing previous notes, test results and face to face with the patient discussing the diagnosis and importance of compliance with the treatment plan as well as documenting on the day of the visit.      An electronic signature was used to authenticate this note.    --Vivian Walker, JOAN - CNP

## 2024-08-15 ENCOUNTER — TELEPHONE (OUTPATIENT)
Dept: FAMILY MEDICINE CLINIC | Age: 29
End: 2024-08-15

## 2024-08-15 DIAGNOSIS — E10.22 TYPE 1 DIABETES MELLITUS WITH STAGE 1 CHRONIC KIDNEY DISEASE (HCC): Primary | ICD-10-CM

## 2024-08-15 DIAGNOSIS — N18.1 TYPE 1 DIABETES MELLITUS WITH STAGE 1 CHRONIC KIDNEY DISEASE (HCC): Primary | ICD-10-CM

## 2024-08-15 NOTE — TELEPHONE ENCOUNTER
Called patient this morning to see if they where interested in switching to the G7 to be able to use the Omnipod as the Sahil 3 Plus is not available. Will wait to her back from her before place this order.

## 2024-08-18 RX ORDER — ACYCLOVIR 400 MG/1
TABLET ORAL
Qty: 1 EACH | Refills: 0 | Status: SHIPPED | OUTPATIENT
Start: 2024-08-18

## 2024-08-18 RX ORDER — ACYCLOVIR 400 MG/1
TABLET ORAL
Qty: 3 EACH | Refills: 11 | Status: SHIPPED | OUTPATIENT
Start: 2024-08-18

## 2024-08-19 NOTE — TELEPHONE ENCOUNTER
Called and spoke with patient and she voiced that she will call me to schedule an appointment when she picks up the G7

## 2024-08-21 ENCOUNTER — CARE COORDINATION (OUTPATIENT)
Dept: CARE COORDINATION | Age: 29
End: 2024-08-21

## 2024-08-21 NOTE — CARE COORDINATION
Patient Current Location: Home: [address] [facility] [address]   The pharmacy will let Pt know when they receive the G7. Pt is requested this SW resends [employer] transportation phone number via postal mail. Pt has not yet received letter with physician names and addresses.     ALPA sent secure message to coworkerDarleen requesting she mails out [employer] phone number for transportation.     ALPA plan of care: ALPA will follow up with Pt on 8/26 to follow up regarding transport to pharmacy to  G7.

## 2024-08-22 ENCOUNTER — CARE COORDINATION (OUTPATIENT)
Dept: CARE COORDINATION | Age: 29
End: 2024-08-22

## 2024-08-26 ENCOUNTER — CARE COORDINATION (OUTPATIENT)
Dept: CARE COORDINATION | Age: 29
End: 2024-08-26

## 2024-08-26 ASSESSMENT — ENCOUNTER SYMPTOMS
EYE DISCHARGE: 1
VISUAL CHANGE: 1
BLURRED VISION: 1
DIARRHEA: 0
NAUSEA: 0

## 2024-08-26 NOTE — CARE COORDINATION
Attempted phone call to Pt to follow up regarding transportation. No answer, vm message left requesting return call, contact number provided.     ALPA plan of care: SW will make next outreach on 9/3 to follow up regarding transportation

## 2024-08-27 ENCOUNTER — CARE COORDINATION (OUTPATIENT)
Dept: CARE COORDINATION | Age: 29
End: 2024-08-27

## 2024-08-27 NOTE — CARE COORDINATION
Phone call to Pt who confirmed she received her CGM on 8/26 and has scheduled an appointment Vivian Aaron to learn how to use it on 8/29. Pt confirmed she has transportation from her uncle.     SW advised pt to contact this SW if she needs assistance with scheduling transportation to future medical appointments. ALPA notified pt that this SW has sent out in large, bold print, information on Dr names / addresses / phone numbers and [facility] Transportation phone number     ALPA plan of care: SW will resolve from  services.

## 2024-09-03 ENCOUNTER — OFFICE VISIT (OUTPATIENT)
Dept: FAMILY MEDICINE CLINIC | Age: 29
End: 2024-09-03
Payer: COMMERCIAL

## 2024-09-03 VITALS
DIASTOLIC BLOOD PRESSURE: 68 MMHG | SYSTOLIC BLOOD PRESSURE: 110 MMHG | HEIGHT: 65 IN | BODY MASS INDEX: 25.36 KG/M2 | HEART RATE: 92 BPM | OXYGEN SATURATION: 98 % | WEIGHT: 152.2 LBS

## 2024-09-03 DIAGNOSIS — E10.3521: Primary | ICD-10-CM

## 2024-09-03 DIAGNOSIS — E10.3522: ICD-10-CM

## 2024-09-03 DIAGNOSIS — Z97.8 USES SELF-APPLIED CONTINUOUS GLUCOSE MONITORING DEVICE: ICD-10-CM

## 2024-09-03 PROCEDURE — 99212 OFFICE O/P EST SF 10 MIN: CPT

## 2024-09-03 PROCEDURE — G8419 CALC BMI OUT NRM PARAM NOF/U: HCPCS

## 2024-09-03 PROCEDURE — 2022F DILAT RTA XM EVC RTNOPTHY: CPT

## 2024-09-03 PROCEDURE — 1036F TOBACCO NON-USER: CPT

## 2024-09-03 PROCEDURE — 3052F HG A1C>EQUAL 8.0%<EQUAL 9.0%: CPT

## 2024-09-03 PROCEDURE — G8427 DOCREV CUR MEDS BY ELIG CLIN: HCPCS

## 2024-09-23 ENCOUNTER — OFFICE VISIT (OUTPATIENT)
Dept: FAMILY MEDICINE CLINIC | Age: 29
End: 2024-09-23
Payer: COMMERCIAL

## 2024-09-23 VITALS
WEIGHT: 151 LBS | SYSTOLIC BLOOD PRESSURE: 102 MMHG | OXYGEN SATURATION: 99 % | HEART RATE: 98 BPM | HEIGHT: 65 IN | DIASTOLIC BLOOD PRESSURE: 64 MMHG | BODY MASS INDEX: 25.16 KG/M2

## 2024-09-23 DIAGNOSIS — F79 INTELLECTUAL DISABILITY: ICD-10-CM

## 2024-09-23 DIAGNOSIS — Z97.8 USES SELF-APPLIED CONTINUOUS GLUCOSE MONITORING DEVICE: ICD-10-CM

## 2024-09-23 DIAGNOSIS — N18.1 TYPE 1 DIABETES MELLITUS WITH STAGE 1 CHRONIC KIDNEY DISEASE (HCC): Primary | ICD-10-CM

## 2024-09-23 DIAGNOSIS — E10.22 TYPE 1 DIABETES MELLITUS WITH STAGE 1 CHRONIC KIDNEY DISEASE (HCC): Primary | ICD-10-CM

## 2024-09-23 PROCEDURE — 99213 OFFICE O/P EST LOW 20 MIN: CPT

## 2024-09-23 PROCEDURE — 2022F DILAT RTA XM EVC RTNOPTHY: CPT

## 2024-09-23 PROCEDURE — G8419 CALC BMI OUT NRM PARAM NOF/U: HCPCS

## 2024-09-23 PROCEDURE — G8427 DOCREV CUR MEDS BY ELIG CLIN: HCPCS

## 2024-09-23 PROCEDURE — 3052F HG A1C>EQUAL 8.0%<EQUAL 9.0%: CPT

## 2024-09-23 PROCEDURE — 1036F TOBACCO NON-USER: CPT

## 2024-09-23 RX ORDER — INSULIN PMP CART,AUT,G6/7,CNTR
EACH SUBCUTANEOUS
Qty: 30 EACH | Refills: 3 | Status: SHIPPED | OUTPATIENT
Start: 2024-09-23

## 2024-09-23 RX ORDER — INSULIN PMP CART,AUT,G6/7,CNTR
EACH SUBCUTANEOUS
Qty: 1 KIT | Refills: 0 | Status: SHIPPED | OUTPATIENT
Start: 2024-09-23

## 2024-09-24 ASSESSMENT — ENCOUNTER SYMPTOMS
DIARRHEA: 0
BLURRED VISION: 1
NAUSEA: 0
VISUAL CHANGE: 1

## 2024-10-16 ENCOUNTER — OFFICE VISIT (OUTPATIENT)
Dept: FAMILY MEDICINE CLINIC | Age: 29
End: 2024-10-16
Payer: COMMERCIAL

## 2024-10-16 VITALS
BODY MASS INDEX: 25.29 KG/M2 | TEMPERATURE: 98.5 F | DIASTOLIC BLOOD PRESSURE: 72 MMHG | OXYGEN SATURATION: 99 % | SYSTOLIC BLOOD PRESSURE: 106 MMHG | HEART RATE: 72 BPM | WEIGHT: 152 LBS

## 2024-10-16 DIAGNOSIS — R19.6 BAD BREATH: ICD-10-CM

## 2024-10-16 DIAGNOSIS — L72.3 SEBACEOUS CYST OF LEFT AXILLA: Primary | ICD-10-CM

## 2024-10-16 PROCEDURE — G8419 CALC BMI OUT NRM PARAM NOF/U: HCPCS | Performed by: FAMILY MEDICINE

## 2024-10-16 PROCEDURE — 1036F TOBACCO NON-USER: CPT | Performed by: FAMILY MEDICINE

## 2024-10-16 PROCEDURE — 99213 OFFICE O/P EST LOW 20 MIN: CPT | Performed by: FAMILY MEDICINE

## 2024-10-16 PROCEDURE — G8484 FLU IMMUNIZE NO ADMIN: HCPCS | Performed by: FAMILY MEDICINE

## 2024-10-16 PROCEDURE — G8427 DOCREV CUR MEDS BY ELIG CLIN: HCPCS | Performed by: FAMILY MEDICINE

## 2024-10-16 NOTE — PROGRESS NOTES
Sebaceous cyst of left axilla  Mook Canchola MD, General Surgery, [place]      2. Bad breath                Plan:      Brush teeth twice per day.  Floss. See dentist regularly     Return if symptoms worsen or fail to improve.

## 2024-10-17 ENCOUNTER — PREP FOR PROCEDURE (OUTPATIENT)
Dept: BARIATRICS/WEIGHT MGMT | Age: 29
End: 2024-10-17

## 2024-10-17 ENCOUNTER — OFFICE VISIT (OUTPATIENT)
Dept: BARIATRICS/WEIGHT MGMT | Age: 29
End: 2024-10-17

## 2024-10-17 ENCOUNTER — TELEPHONE (OUTPATIENT)
Dept: SURGERY | Age: 29
End: 2024-10-17

## 2024-10-17 VITALS
HEIGHT: 65 IN | SYSTOLIC BLOOD PRESSURE: 124 MMHG | BODY MASS INDEX: 25.33 KG/M2 | HEART RATE: 94 BPM | DIASTOLIC BLOOD PRESSURE: 72 MMHG | WEIGHT: 152 LBS

## 2024-10-17 DIAGNOSIS — L72.3 SEBACEOUS CYST OF LEFT AXILLA: ICD-10-CM

## 2024-10-17 DIAGNOSIS — L72.3 SEBACEOUS CYST OF LEFT AXILLA: Primary | ICD-10-CM

## 2024-10-17 RX ORDER — SULFAMETHOXAZOLE/TRIMETHOPRIM 800-160 MG
1 TABLET ORAL 2 TIMES DAILY
Qty: 20 TABLET | Refills: 0 | Status: SHIPPED | OUTPATIENT
Start: 2024-10-17 | End: 2024-10-27

## 2024-10-17 RX ORDER — SODIUM CHLORIDE, SODIUM LACTATE, POTASSIUM CHLORIDE, CALCIUM CHLORIDE 600; 310; 30; 20 MG/100ML; MG/100ML; MG/100ML; MG/100ML
INJECTION, SOLUTION INTRAVENOUS CONTINUOUS
OUTPATIENT
Start: 2024-10-17

## 2024-10-17 RX ORDER — SODIUM CHLORIDE 0.9 % (FLUSH) 0.9 %
5-40 SYRINGE (ML) INJECTION PRN
OUTPATIENT
Start: 2024-10-17

## 2024-10-17 RX ORDER — SODIUM CHLORIDE 9 MG/ML
INJECTION, SOLUTION INTRAVENOUS PRN
OUTPATIENT
Start: 2024-10-17

## 2024-10-17 RX ORDER — SODIUM CHLORIDE 0.9 % (FLUSH) 0.9 %
5-40 SYRINGE (ML) INJECTION EVERY 12 HOURS SCHEDULED
OUTPATIENT
Start: 2024-10-17

## 2024-10-17 NOTE — PROGRESS NOTES
1.6 cm hypoechoic heterogeneous soft tissue in the subcutaneous  soft tissues of the left axilla corresponding to area of palpable concern.     IMPRESSION:  Indeterminate soft tissue nodule of the left axilla corresponding to area of  palpable concern.   Recommend consideration of tissue sampling for further  evaluation.  Though less favored, a hematoma, potentially with superimposed  infection, could also be considered.        ASSESSMENT:  1. Sebaceous cyst of left axilla          PLAN:  Treatment:    -reviewed U/S report / images. D/w pt.   -Persistent axillary cyst. Will recur until surgery to remove.   -Bactrim x 10 d. Schedule for excision on 10/28/24.   -Consent obtained. Reviewed in detail with the patient and/or family the expected pre-operative, operative, and post-operative courses including risks, benefits, and alternatives to the procedure. The patient's questions were answered in detail and agreed to proceed with the procedure.   -Orders placed.       Patient counseled on risks, benefits, and alternatives of treatment plan at length today. Patient states an understanding and willingness to proceed with plan.    No orders of the defined types were placed in this encounter.       No orders of the defined types were placed in this encounter.       Follow Up:  No follow-ups on file.      Mook Baig II, MD

## 2024-10-17 NOTE — TELEPHONE ENCOUNTER
SPOKE TO FOR Estela Perales REGARDING SURGERY (PROCEDURE NAME) SCHEDULED @ [facility]. NOTIFIED OF DATES, TIMES AND LOCATION    PHONE ASSESSMENT   SURGERY - 10/28/24 arrival at 11:00  P/O - 11/7/24    NPO AFTER MIDNIGHT  HOLD BLOOD THINNERS - No blood thinners are currently on med list

## 2024-10-18 DIAGNOSIS — N18.1 TYPE 1 DIABETES MELLITUS WITH STAGE 1 CHRONIC KIDNEY DISEASE (HCC): ICD-10-CM

## 2024-10-18 DIAGNOSIS — E10.22 TYPE 1 DIABETES MELLITUS WITH STAGE 1 CHRONIC KIDNEY DISEASE (HCC): ICD-10-CM

## 2024-10-20 RX ORDER — INSULIN PMP CART,AUT,G6/7,CNTR
EACH SUBCUTANEOUS
Qty: 30 EACH | Refills: 3 | Status: SHIPPED | OUTPATIENT
Start: 2024-10-20

## 2024-10-20 RX ORDER — INSULIN PMP CART,AUT,G6/7,CNTR
EACH SUBCUTANEOUS
Qty: 1 KIT | Refills: 0 | Status: SHIPPED | OUTPATIENT
Start: 2024-10-20

## 2024-10-21 NOTE — PROGRESS NOTES
.Surgery @ [facility] on 10/28/24 you will be called 10/25/24 with times    NOTHING TO EAT OR DRINK AFTER MIDNIGHT DAY OF SURGERY    1. Enter thru the hospital main entrance on day of surgery, check in at the Information Desk. If you arrive prior to 6:00am, enter thru the ER entrance.    2. Follow the directions as prescribed by the doctor for your procedure and medications.         Morning of surgery take: no medications         Stop vitamins, supplements and NSAIDS:  today     3. Check with your Doctor regarding stopping blood thinners and follow their instructions.    4. Do not smoke, vape or use chewing tobacco morning of surgery. Do not drink any alcoholic beverages 24 hours prior to surgery.       This includes NA Beer. No street drugs 7 days prior to surgery.    5. If you have dentures, contacts of glasses they will be removed before going to the OR; please bring a case.    6. Please bring picture ID, insurance card, paperwork from the doctor’s office (H & P, Consent, & card for implantable devices).    7. Take a shower with an antibacterial soap the night before surgery and the morning of surgery. Do not put anything on your skin      After your morning shower.    8. You will need a responsible adult to drive you home and check on you after surgery.

## 2024-10-25 ENCOUNTER — ANESTHESIA EVENT (OUTPATIENT)
Dept: OPERATING ROOM | Age: 29
End: 2024-10-25
Payer: COMMERCIAL

## 2024-10-25 NOTE — PROGRESS NOTES
10/25/24 -  for patient: surgery @ [facility] on  10/28/24 @ 1330, arrival 1130. NPO status and morning medications reviewed. Please call with any questions.

## 2024-10-28 ENCOUNTER — ANESTHESIA (OUTPATIENT)
Dept: OPERATING ROOM | Age: 29
End: 2024-10-28
Payer: COMMERCIAL

## 2024-10-28 ENCOUNTER — APPOINTMENT (OUTPATIENT)
Dept: GENERAL RADIOLOGY | Age: 29
End: 2024-10-28
Attending: SURGERY
Payer: COMMERCIAL

## 2024-10-28 ENCOUNTER — HOSPITAL ENCOUNTER (INPATIENT)
Age: 29
LOS: 3 days | Discharge: HOME | End: 2024-10-31
Attending: SURGERY | Admitting: STUDENT IN AN ORGANIZED HEALTH CARE EDUCATION/TRAINING PROGRAM
Payer: COMMERCIAL

## 2024-10-28 DIAGNOSIS — L72.3 SEBACEOUS CYST OF LEFT AXILLA: ICD-10-CM

## 2024-10-28 PROBLEM — R50.82 POSTOPERATIVE FEVER: Status: ACTIVE | Noted: 2024-10-28

## 2024-10-28 LAB
GLUCOSE BLD-MCNC: 167 MG/DL (ref 74–99)
GLUCOSE BLD-MCNC: 265 MG/DL (ref 74–99)
GLUCOSE BLD-MCNC: 283 MG/DL (ref 74–99)
GLUCOSE BLD-MCNC: 284 MG/DL (ref 74–99)
GLUCOSE BLD-MCNC: 339 MG/DL (ref 74–99)
HCG, PREGNANCY URINE (POC): NEGATIVE

## 2024-10-28 PROCEDURE — 7100000001 HC PACU RECOVERY - ADDTL 15 MIN: Performed by: SURGERY

## 2024-10-28 PROCEDURE — 6370000000 HC RX 637 (ALT 250 FOR IP): Performed by: STUDENT IN AN ORGANIZED HEALTH CARE EDUCATION/TRAINING PROGRAM

## 2024-10-28 PROCEDURE — 3600000013 HC SURGERY LEVEL 3 ADDTL 15MIN: Performed by: SURGERY

## 2024-10-28 PROCEDURE — 3700000001 HC ADD 15 MINUTES (ANESTHESIA): Performed by: SURGERY

## 2024-10-28 PROCEDURE — 6360000002 HC RX W HCPCS: Performed by: STUDENT IN AN ORGANIZED HEALTH CARE EDUCATION/TRAINING PROGRAM

## 2024-10-28 PROCEDURE — 87070 CULTURE OTHR SPECIMN AEROBIC: CPT

## 2024-10-28 PROCEDURE — 7100000011 HC PHASE II RECOVERY - ADDTL 15 MIN: Performed by: SURGERY

## 2024-10-28 PROCEDURE — 2580000003 HC RX 258: Performed by: STUDENT IN AN ORGANIZED HEALTH CARE EDUCATION/TRAINING PROGRAM

## 2024-10-28 PROCEDURE — 87205 SMEAR GRAM STAIN: CPT

## 2024-10-28 PROCEDURE — 3600000003 HC SURGERY LEVEL 3 BASE: Performed by: SURGERY

## 2024-10-28 PROCEDURE — 2580000003 HC RX 258: Performed by: SURGERY

## 2024-10-28 PROCEDURE — 7100000000 HC PACU RECOVERY - FIRST 15 MIN: Performed by: SURGERY

## 2024-10-28 PROCEDURE — 81025 URINE PREGNANCY TEST: CPT

## 2024-10-28 PROCEDURE — 6360000002 HC RX W HCPCS: Performed by: SURGERY

## 2024-10-28 PROCEDURE — 2500000003 HC RX 250 WO HCPCS: Performed by: SURGERY

## 2024-10-28 PROCEDURE — 6360000002 HC RX W HCPCS: Performed by: ANESTHESIOLOGY

## 2024-10-28 PROCEDURE — 87075 CULTR BACTERIA EXCEPT BLOOD: CPT

## 2024-10-28 PROCEDURE — 2709999900 HC NON-CHARGEABLE SUPPLY: Performed by: SURGERY

## 2024-10-28 PROCEDURE — 3700000000 HC ANESTHESIA ATTENDED CARE: Performed by: SURGERY

## 2024-10-28 PROCEDURE — 6360000002 HC RX W HCPCS

## 2024-10-28 PROCEDURE — 6370000000 HC RX 637 (ALT 250 FOR IP): Performed by: ANESTHESIOLOGY

## 2024-10-28 PROCEDURE — 87076 CULTURE ANAEROBE IDENT EACH: CPT

## 2024-10-28 PROCEDURE — 87077 CULTURE AEROBIC IDENTIFY: CPT

## 2024-10-28 PROCEDURE — 2500000003 HC RX 250 WO HCPCS

## 2024-10-28 PROCEDURE — 7100000010 HC PHASE II RECOVERY - FIRST 15 MIN: Performed by: SURGERY

## 2024-10-28 PROCEDURE — 0HBCXZZ EXCISION OF LEFT UPPER ARM SKIN, EXTERNAL APPROACH: ICD-10-PCS | Performed by: INTERNAL MEDICINE

## 2024-10-28 PROCEDURE — 82962 GLUCOSE BLOOD TEST: CPT

## 2024-10-28 PROCEDURE — 71045 X-RAY EXAM CHEST 1 VIEW: CPT

## 2024-10-28 PROCEDURE — 2140000000 HC CCU INTERMEDIATE R&B

## 2024-10-28 PROCEDURE — 0X950ZZ DRAINAGE OF LEFT AXILLA, OPEN APPROACH: ICD-10-PCS | Performed by: INTERNAL MEDICINE

## 2024-10-28 RX ORDER — POLYETHYLENE GLYCOL 3350 17 G/17G
17 POWDER, FOR SOLUTION ORAL DAILY PRN
Status: DISCONTINUED | OUTPATIENT
Start: 2024-10-28 | End: 2024-10-31 | Stop reason: HOSPADM

## 2024-10-28 RX ORDER — ONDANSETRON 2 MG/ML
4 INJECTION INTRAMUSCULAR; INTRAVENOUS
Status: DISCONTINUED | OUTPATIENT
Start: 2024-10-28 | End: 2024-10-28 | Stop reason: HOSPADM

## 2024-10-28 RX ORDER — GLUCAGON 1 MG/ML
1 KIT INJECTION PRN
Status: DISCONTINUED | OUTPATIENT
Start: 2024-10-28 | End: 2024-10-31 | Stop reason: HOSPADM

## 2024-10-28 RX ORDER — DEXTROSE MONOHYDRATE 100 MG/ML
INJECTION, SOLUTION INTRAVENOUS CONTINUOUS PRN
Status: DISCONTINUED | OUTPATIENT
Start: 2024-10-28 | End: 2024-10-31 | Stop reason: HOSPADM

## 2024-10-28 RX ORDER — HYDROCODONE BITARTRATE AND ACETAMINOPHEN 5; 325 MG/1; MG/1
1 TABLET ORAL EVERY 6 HOURS PRN
Qty: 12 TABLET | Refills: 0 | Status: SHIPPED | OUTPATIENT
Start: 2024-10-28 | End: 2024-10-31

## 2024-10-28 RX ORDER — DIPHENHYDRAMINE HYDROCHLORIDE 50 MG/ML
12.5 INJECTION INTRAMUSCULAR; INTRAVENOUS
Status: DISCONTINUED | OUTPATIENT
Start: 2024-10-28 | End: 2024-10-28 | Stop reason: HOSPADM

## 2024-10-28 RX ORDER — SODIUM CHLORIDE, SODIUM LACTATE, POTASSIUM CHLORIDE, CALCIUM CHLORIDE 600; 310; 30; 20 MG/100ML; MG/100ML; MG/100ML; MG/100ML
INJECTION, SOLUTION INTRAVENOUS CONTINUOUS
Status: DISCONTINUED | OUTPATIENT
Start: 2024-10-28 | End: 2024-10-28 | Stop reason: HOSPADM

## 2024-10-28 RX ORDER — POTASSIUM CHLORIDE 1500 MG/1
40 TABLET, EXTENDED RELEASE ORAL PRN
Status: ACTIVE | OUTPATIENT
Start: 2024-10-28 | End: 2024-10-30

## 2024-10-28 RX ORDER — ONDANSETRON 4 MG/1
4 TABLET, FILM COATED ORAL DAILY PRN
Qty: 20 TABLET | Refills: 3 | Status: SHIPPED | OUTPATIENT
Start: 2024-10-28

## 2024-10-28 RX ORDER — ACETAMINOPHEN 325 MG/1
975 TABLET ORAL ONCE
Status: COMPLETED | OUTPATIENT
Start: 2024-10-28 | End: 2024-10-28

## 2024-10-28 RX ORDER — ONDANSETRON 2 MG/ML
INJECTION INTRAMUSCULAR; INTRAVENOUS
Status: DISCONTINUED | OUTPATIENT
Start: 2024-10-28 | End: 2024-10-28 | Stop reason: SDUPTHER

## 2024-10-28 RX ORDER — VASOPRESSIN 20 U/ML
INJECTION PARENTERAL
Status: DISCONTINUED | OUTPATIENT
Start: 2024-10-28 | End: 2024-10-28 | Stop reason: SDUPTHER

## 2024-10-28 RX ORDER — PHENYLEPHRINE HCL IN 0.9% NACL 1 MG/10 ML
SYRINGE (ML) INTRAVENOUS
Status: DISCONTINUED | OUTPATIENT
Start: 2024-10-28 | End: 2024-10-28 | Stop reason: SDUPTHER

## 2024-10-28 RX ORDER — ONDANSETRON 2 MG/ML
4 INJECTION INTRAMUSCULAR; INTRAVENOUS EVERY 6 HOURS PRN
Status: DISCONTINUED | OUTPATIENT
Start: 2024-10-28 | End: 2024-10-31 | Stop reason: HOSPADM

## 2024-10-28 RX ORDER — INSULIN LISPRO 100 [IU]/ML
0-8 INJECTION, SOLUTION INTRAVENOUS; SUBCUTANEOUS
Status: DISCONTINUED | OUTPATIENT
Start: 2024-10-28 | End: 2024-10-31 | Stop reason: HOSPADM

## 2024-10-28 RX ORDER — ACETAMINOPHEN 325 MG/1
650 TABLET ORAL EVERY 6 HOURS PRN
Status: DISCONTINUED | OUTPATIENT
Start: 2024-10-28 | End: 2024-10-31 | Stop reason: HOSPADM

## 2024-10-28 RX ORDER — ACETAMINOPHEN 650 MG/1
650 SUPPOSITORY RECTAL EVERY 6 HOURS PRN
Status: DISCONTINUED | OUTPATIENT
Start: 2024-10-28 | End: 2024-10-31 | Stop reason: HOSPADM

## 2024-10-28 RX ORDER — FENTANYL CITRATE 50 UG/ML
INJECTION, SOLUTION INTRAMUSCULAR; INTRAVENOUS
Status: DISCONTINUED | OUTPATIENT
Start: 2024-10-28 | End: 2024-10-28 | Stop reason: SDUPTHER

## 2024-10-28 RX ORDER — SODIUM CHLORIDE 0.9 % (FLUSH) 0.9 %
5-40 SYRINGE (ML) INJECTION PRN
Status: DISCONTINUED | OUTPATIENT
Start: 2024-10-28 | End: 2024-10-31 | Stop reason: HOSPADM

## 2024-10-28 RX ORDER — AMOXICILLIN 250 MG
2 CAPSULE ORAL DAILY
Qty: 28 TABLET | Refills: 0 | Status: SHIPPED | OUTPATIENT
Start: 2024-10-28 | End: 2024-11-11

## 2024-10-28 RX ORDER — LIDOCAINE HYDROCHLORIDE 20 MG/ML
INJECTION, SOLUTION EPIDURAL; INFILTRATION; INTRACAUDAL; PERINEURAL
Status: DISCONTINUED | OUTPATIENT
Start: 2024-10-28 | End: 2024-10-28 | Stop reason: SDUPTHER

## 2024-10-28 RX ORDER — LISINOPRIL 5 MG/1
10 TABLET ORAL DAILY
Status: CANCELLED | OUTPATIENT
Start: 2024-10-28

## 2024-10-28 RX ORDER — PROPOFOL 10 MG/ML
INJECTION, EMULSION INTRAVENOUS
Status: DISCONTINUED | OUTPATIENT
Start: 2024-10-28 | End: 2024-10-28 | Stop reason: SDUPTHER

## 2024-10-28 RX ORDER — SODIUM CHLORIDE 0.9 % (FLUSH) 0.9 %
5-40 SYRINGE (ML) INJECTION PRN
Status: DISCONTINUED | OUTPATIENT
Start: 2024-10-28 | End: 2024-10-28 | Stop reason: HOSPADM

## 2024-10-28 RX ORDER — ENOXAPARIN SODIUM 100 MG/ML
40 INJECTION SUBCUTANEOUS DAILY
Status: DISCONTINUED | OUTPATIENT
Start: 2024-10-29 | End: 2024-10-31 | Stop reason: HOSPADM

## 2024-10-28 RX ORDER — NALOXONE HYDROCHLORIDE 0.4 MG/ML
INJECTION, SOLUTION INTRAMUSCULAR; INTRAVENOUS; SUBCUTANEOUS PRN
Status: DISCONTINUED | OUTPATIENT
Start: 2024-10-28 | End: 2024-10-28 | Stop reason: HOSPADM

## 2024-10-28 RX ORDER — SODIUM CHLORIDE 9 MG/ML
INJECTION, SOLUTION INTRAVENOUS PRN
Status: DISCONTINUED | OUTPATIENT
Start: 2024-10-28 | End: 2024-10-28 | Stop reason: HOSPADM

## 2024-10-28 RX ORDER — METOCLOPRAMIDE HYDROCHLORIDE 5 MG/ML
10 INJECTION INTRAMUSCULAR; INTRAVENOUS
Status: DISCONTINUED | OUTPATIENT
Start: 2024-10-28 | End: 2024-10-28 | Stop reason: HOSPADM

## 2024-10-28 RX ORDER — LIDOCAINE HYDROCHLORIDE 10 MG/ML
INJECTION, SOLUTION EPIDURAL; INFILTRATION; INTRACAUDAL; PERINEURAL
Status: COMPLETED | OUTPATIENT
Start: 2024-10-28 | End: 2024-10-28

## 2024-10-28 RX ORDER — MAGNESIUM SULFATE IN WATER 40 MG/ML
2000 INJECTION, SOLUTION INTRAVENOUS PRN
Status: DISCONTINUED | OUTPATIENT
Start: 2024-10-28 | End: 2024-10-31 | Stop reason: HOSPADM

## 2024-10-28 RX ORDER — SODIUM CHLORIDE, SODIUM LACTATE, POTASSIUM CHLORIDE, CALCIUM CHLORIDE 600; 310; 30; 20 MG/100ML; MG/100ML; MG/100ML; MG/100ML
INJECTION, SOLUTION INTRAVENOUS CONTINUOUS
Status: DISPENSED | OUTPATIENT
Start: 2024-10-28 | End: 2024-10-29

## 2024-10-28 RX ORDER — ACETAMINOPHEN 325 MG/1
650 TABLET ORAL
Status: DISCONTINUED | OUTPATIENT
Start: 2024-10-28 | End: 2024-10-28 | Stop reason: HOSPADM

## 2024-10-28 RX ORDER — LABETALOL HYDROCHLORIDE 5 MG/ML
10 INJECTION, SOLUTION INTRAVENOUS
Status: DISCONTINUED | OUTPATIENT
Start: 2024-10-28 | End: 2024-10-28 | Stop reason: HOSPADM

## 2024-10-28 RX ORDER — SODIUM CHLORIDE 0.9 % (FLUSH) 0.9 %
5-40 SYRINGE (ML) INJECTION EVERY 12 HOURS SCHEDULED
Status: DISCONTINUED | OUTPATIENT
Start: 2024-10-28 | End: 2024-10-28 | Stop reason: HOSPADM

## 2024-10-28 RX ORDER — POTASSIUM CHLORIDE 7.45 MG/ML
10 INJECTION INTRAVENOUS PRN
Status: ACTIVE | OUTPATIENT
Start: 2024-10-28 | End: 2024-10-30

## 2024-10-28 RX ORDER — SODIUM CHLORIDE 9 MG/ML
INJECTION, SOLUTION INTRAVENOUS PRN
Status: DISCONTINUED | OUTPATIENT
Start: 2024-10-28 | End: 2024-10-31 | Stop reason: HOSPADM

## 2024-10-28 RX ORDER — SODIUM CHLORIDE, SODIUM LACTATE, POTASSIUM CHLORIDE, CALCIUM CHLORIDE 600; 310; 30; 20 MG/100ML; MG/100ML; MG/100ML; MG/100ML
INJECTION, SOLUTION INTRAVENOUS CONTINUOUS
Status: DISCONTINUED | OUTPATIENT
Start: 2024-10-28 | End: 2024-10-28

## 2024-10-28 RX ORDER — ONDANSETRON 4 MG/1
4 TABLET, ORALLY DISINTEGRATING ORAL EVERY 8 HOURS PRN
Status: DISCONTINUED | OUTPATIENT
Start: 2024-10-28 | End: 2024-10-31 | Stop reason: HOSPADM

## 2024-10-28 RX ORDER — SODIUM CHLORIDE 0.9 % (FLUSH) 0.9 %
5-40 SYRINGE (ML) INJECTION EVERY 12 HOURS SCHEDULED
Status: DISCONTINUED | OUTPATIENT
Start: 2024-10-28 | End: 2024-10-31 | Stop reason: HOSPADM

## 2024-10-28 RX ADMIN — HYDROMORPHONE HYDROCHLORIDE 0.5 MG: 1 INJECTION, SOLUTION INTRAMUSCULAR; INTRAVENOUS; SUBCUTANEOUS at 14:12

## 2024-10-28 RX ADMIN — ACETAMINOPHEN 975 MG: 325 TABLET ORAL at 16:18

## 2024-10-28 RX ADMIN — SODIUM CHLORIDE, POTASSIUM CHLORIDE, SODIUM LACTATE AND CALCIUM CHLORIDE: 600; 310; 30; 20 INJECTION, SOLUTION INTRAVENOUS at 12:30

## 2024-10-28 RX ADMIN — PROPOFOL 50 MG: 10 INJECTION, EMULSION INTRAVENOUS at 13:05

## 2024-10-28 RX ADMIN — FENTANYL CITRATE 50 MCG: 50 INJECTION, SOLUTION INTRAMUSCULAR; INTRAVENOUS at 12:56

## 2024-10-28 RX ADMIN — INSULIN HUMAN 5 UNITS: 100 INJECTION, SOLUTION PARENTERAL at 14:49

## 2024-10-28 RX ADMIN — FENTANYL CITRATE 50 MCG: 50 INJECTION, SOLUTION INTRAMUSCULAR; INTRAVENOUS at 13:06

## 2024-10-28 RX ADMIN — VASOPRESSIN 2 UNITS: 20 INJECTION INTRAVENOUS at 13:19

## 2024-10-28 RX ADMIN — SODIUM CHLORIDE, PRESERVATIVE FREE 10 ML: 5 INJECTION INTRAVENOUS at 21:08

## 2024-10-28 RX ADMIN — Medication 0.1 MG: at 13:12

## 2024-10-28 RX ADMIN — INSULIN HUMAN 6 UNITS: 100 INJECTION, SOLUTION PARENTERAL at 12:29

## 2024-10-28 RX ADMIN — VASOPRESSIN 1 UNITS: 20 INJECTION INTRAVENOUS at 13:27

## 2024-10-28 RX ADMIN — SODIUM CHLORIDE 3000 MG: 900 INJECTION INTRAVENOUS at 21:10

## 2024-10-28 RX ADMIN — ONDANSETRON 4 MG: 2 INJECTION INTRAMUSCULAR; INTRAVENOUS at 12:56

## 2024-10-28 RX ADMIN — LIDOCAINE HYDROCHLORIDE 100 MG: 20 INJECTION, SOLUTION EPIDURAL; INFILTRATION; INTRACAUDAL; PERINEURAL at 12:56

## 2024-10-28 RX ADMIN — SODIUM CHLORIDE, POTASSIUM CHLORIDE, SODIUM LACTATE AND CALCIUM CHLORIDE: 600; 310; 30; 20 INJECTION, SOLUTION INTRAVENOUS at 21:07

## 2024-10-28 RX ADMIN — Medication 0.1 MG: at 13:05

## 2024-10-28 RX ADMIN — PROPOFOL 150 MG: 10 INJECTION, EMULSION INTRAVENOUS at 12:56

## 2024-10-28 RX ADMIN — CEFAZOLIN 2000 MG: 2 INJECTION, POWDER, FOR SOLUTION INTRAMUSCULAR; INTRAVENOUS at 13:03

## 2024-10-28 RX ADMIN — INSULIN LISPRO 6 UNITS: 100 INJECTION, SOLUTION INTRAVENOUS; SUBCUTANEOUS at 21:08

## 2024-10-28 ASSESSMENT — PAIN DESCRIPTION - ORIENTATION
ORIENTATION: LEFT

## 2024-10-28 ASSESSMENT — PAIN DESCRIPTION - LOCATION
LOCATION: OTHER (COMMENT)

## 2024-10-28 ASSESSMENT — PAIN - FUNCTIONAL ASSESSMENT
PAIN_FUNCTIONAL_ASSESSMENT: PREVENTS OR INTERFERES SOME ACTIVE ACTIVITIES AND ADLS
PAIN_FUNCTIONAL_ASSESSMENT: 0-10
PAIN_FUNCTIONAL_ASSESSMENT: PREVENTS OR INTERFERES SOME ACTIVE ACTIVITIES AND ADLS
PAIN_FUNCTIONAL_ASSESSMENT: 0-10
PAIN_FUNCTIONAL_ASSESSMENT: 0-10
PAIN_FUNCTIONAL_ASSESSMENT: PREVENTS OR INTERFERES SOME ACTIVE ACTIVITIES AND ADLS

## 2024-10-28 ASSESSMENT — PAIN DESCRIPTION - FREQUENCY
FREQUENCY: CONTINUOUS
FREQUENCY: INTERMITTENT
FREQUENCY: CONTINUOUS

## 2024-10-28 ASSESSMENT — PAIN SCALES - GENERAL
PAINLEVEL_OUTOF10: 3
PAINLEVEL_OUTOF10: 3
PAINLEVEL_OUTOF10: 5
PAINLEVEL_OUTOF10: 3
PAINLEVEL_OUTOF10: 2
PAINLEVEL_OUTOF10: 3
PAINLEVEL_OUTOF10: 7

## 2024-10-28 ASSESSMENT — PAIN DESCRIPTION - PAIN TYPE
TYPE: ACUTE PAIN
TYPE: SURGICAL PAIN
TYPE: SURGICAL PAIN
TYPE: ACUTE PAIN;SURGICAL PAIN
TYPE: SURGICAL PAIN
TYPE: CHRONIC PAIN

## 2024-10-28 ASSESSMENT — PAIN DESCRIPTION - DESCRIPTORS
DESCRIPTORS: DISCOMFORT
DESCRIPTORS: ACHING
DESCRIPTORS: ACHING
DESCRIPTORS: DISCOMFORT
DESCRIPTORS: ACHING
DESCRIPTORS: DISCOMFORT
DESCRIPTORS: ACHING;DISCOMFORT
DESCRIPTORS: ACHING
DESCRIPTORS: DISCOMFORT

## 2024-10-28 ASSESSMENT — PAIN DESCRIPTION - ONSET
ONSET: ON-GOING

## 2024-10-28 ASSESSMENT — PAIN SCALES - WONG BAKER: WONGBAKER_NUMERICALRESPONSE: NO HURT

## 2024-10-28 NOTE — PROGRESS NOTES
1352: Arrived to PACU from OR. Monitors applied, alarms on. Report obtained from Farida RUSS and Chuy KAN. Coughing non-productively.  1400: Coughing has stopped.  1412: Medicated for left axillary discomfort. Warm blankets on.  1420: Dr. Bolton notified of accu check 284. Coverage orders received.  1449: Coverage given as ordered.  1510: Dozing.  1523: Transported to [facility].

## 2024-10-28 NOTE — PROGRESS NOTES
1525: Patient returns to [facility] following procedure, bedside report received from Isamar RUSS, oral temp reading 102.2, heart rate 120.   1535: Dr Baig notified of patient VS, instructed to reach out to anesthesia.   1555: Dr. Bolton notified of patient VS, order placed for tylenol.   1613: beverage of choice provided, IV fluid opened to gravity. Cousin sha contacted, stated she would come back when patient is ready to leave.    1700: despite patient receiving tylenol and 700ml of fluid, patient is still running high heart rate, blood pressure continues to decrease and temp remains elevated. Dr De Luna notified of current VS, instructed to reach out to hospitalist for admission orders.   1725: Sent hospitalist perfectserve. Patients chavaSha, updated on patients condition with patients consent.   1730: Dr. Baig updated.   1748: Dr. Gabriel at bedside speaking with patient  1800: patient dinner order placed  1830: patient dinner arrived, patient ate sitting at bedside.   1900: report called to ludivina RUSS on 3N. Will place patient on transport when room is clean.   1935: cousin and grandma at bedside visiting with patient  2005: patient transported to room 3120

## 2024-10-28 NOTE — H&P
V2.0  History and Physical      Name:  Estela Perales /Age/Sex: 1995  (29 y.o. female)   MRN & CSN:  [id number] & [id number] Encounter Date/Time: 10/28/2024 6:24 PM EDT   Location:  OR/NONE PCP: Roxana Mclaughlin MD       Hospital Day: 1    Assessment and Plan:   Estela Perales is a 29 y.o. female with a pmh of type 1 diabetes mellitus who presents with Sebaceous cyst of left axilla    Hospital Problems             Last Modified POA    * (Principal) Sebaceous cyst of left axilla 10/17/2024 Unknown    Postoperative fever 10/28/2024 Yes       Plan:  Fever in the postop setting:  -Admit to MedSurg/SDU  -Telemetry  -Continue IV fluids  -Follow infectious workup for possible source of sepsis including UA/BCx/CXR/inflammatory markers  -Will start with Unasyn for now and may de-escalate based on further labs    Type 1 diabetes mellitus:  -Hold home Humalog, start with MD WRIGHT with hypoglycemia protocol    Hypertension-continue lisinopril with holding parameters    Disposition:   Current Living situation: Home  Expected Disposition: Home  Estimated D/C: 2 days    Diet ADULT DIET; Regular   DVT Prophylaxis [x] Lovenox, []  Heparin, [] SCDs, [] Ambulation,  [] Eliquis, [] Xarelto, [] Coumadin   Code Status Full Code   Surrogate Decision Maker/ POA Self     Personally reviewed Lab Studies and Imaging     Discussed management of the case with OR team/anesthesia who recommended admission for fever workup    EKG interpreted personally and results pending    Imaging that was interpreted personally includes chest x-ray and results being    Drugs that require monitoring for toxicity include Unasyn and the method of monitoring was allergic reaction    History from:     patient    History of Present Illness:     Chief Complaint: Postop fever  Estela Perales is a 29 y.o. female with pmh of type 1 diabetes mellitus who presents to the hospital for surgical removal of suspected infected left axillary cyst.  Patient

## 2024-10-28 NOTE — PROGRESS NOTES
1143 Dr Cornell called, patient POCT 283, Dr Cornell to place orders  1245 CRNA to re check poct in OR

## 2024-10-28 NOTE — PROGRESS NOTES
Spoke with pt, ride will not be available until 1030 but pt agrees to come in for procedure as soon as possible.

## 2024-10-28 NOTE — ANESTHESIA PRE PROCEDURE
Department of Anesthesiology  Preprocedure Note       Name:  Estela Perales   Age:  29 y.o.  :  1995                                          MRN:  [id number]         Date:  10/25/2024      Surgeon: Surgeon(s):  Mook Baig II, MD    Procedure: Procedure(s):  EXCISION OF LEFT AXILLARY CYST    Medications prior to admission:   Prior to Admission medications    Medication Sig Start Date End Date Taking? Authorizing Provider   Insulin Disposable Pump (OMNIPOD 5 G7 PODS, GEN 5,) MISC Change pod every 72 hours, use as directed. 10/20/24   Vivian Walker APRN - CNP   Insulin Disposable Pump (OMNIPOD 5 G7 INTRO, GEN 5,) KIT Change pod every 72 hours, use as directed. 10/20/24   Vivian Walker APRN - CNP   sulfamethoxazole-trimethoprim (BACTRIM DS;SEPTRA DS) 800-160 MG per tablet Take 1 tablet by mouth 2 times daily for 10 days  Patient not taking: Reported on 10/21/2024 10/17/24 10/27/24  Mook Baig II, MD   Continuous Glucose  (DEXCOM G7 ) KELVIN For use with Dexcom G7 sensors for continuous glucose monitoring. 24   Vivian Wlaker APRN - CNP   Continuous Glucose Sensor (DEXCOM G7 SENSOR) MISC Apply new sensor every 10 days as directed for continuous glucose monitoring. 24   Vivian Walker APRN - CNP   Alcohol Swabs PADS Use as directed for blood sugar testing 4 times per day. 24   Vivian Walker APRN - CNP   Insulin Pen Needle 32G X 4 MM MISC Use new pen needle with each insulin dose 2 times daily. 6/3/24   Vivian Walker APRN - CNP   blood glucose test strips (ACCU-CHEK GUIDE) strip 1 each by Does not apply route 4 times daily (with meals and nightly) 6/3/24   Vivian Walker APRN - CNP   blood glucose monitor kit and supplies Dispense 1 glucometer starter kit for blood sugar testing 4 times daily. 24   Vivian Walker APRN - CNP   blood glucose monitor strips Test blood sugars 4 times a day 24   Vivian Walker APRN - CNP   varinodeco Ultra Thin 
lower legs R22.43    Hypoglycemia E16.2    Microalbuminuria R80.9    Sebaceous cyst of left axilla L72.3       Past Medical History:        Diagnosis Date    Chronic kidney disease     Chronic kidney disease (CKD) stage G1/A1, glomerular filtration rate (GFR) equal to or greater than 90 mL/min/1.73 square meter and albuminuria creatinine ratio less than 30 mg/g 1/22/2018    Diabetes mellitus (HCC)     DKA (diabetic ketoacidoses)     DKA (diabetic ketoacidoses)     Type 2 diabetes mellitus with hyperosmolarity, uncontrolled (HCC) 12/8/2022       Past Surgical History:        Procedure Laterality Date    EYE SURGERY         Social History:    Social History     Tobacco Use    Smoking status: Never    Smokeless tobacco: Never   Substance Use Topics    Alcohol use: No                                Counseling given: Not Answered      Vital Signs (Current):   Vitals:    10/21/24 1831   Weight: 68 kg (150 lb)   Height: 1.651 m (5' 5\")                                              BP Readings from Last 3 Encounters:   10/17/24 124/72   10/16/24 106/72   09/23/24 102/64       NPO Status:                                                                                 BMI:   Wt Readings from Last 3 Encounters:   10/21/24 68 kg (150 lb)   10/17/24 68.9 kg (152 lb)   10/16/24 68.9 kg (152 lb)     Body mass index is 24.96 kg/m².    CBC:   Lab Results   Component Value Date/Time    WBC 12.4 12/04/2023 09:15 PM    RBC 4.60 12/04/2023 09:15 PM    HGB 13.1 12/04/2023 09:15 PM    HCT 41.9 12/04/2023 09:15 PM    MCV 91.1 12/04/2023 09:15 PM    RDW 11.9 12/04/2023 09:15 PM     12/04/2023 09:15 PM       CMP:   Lab Results   Component Value Date/Time     03/07/2024 10:06 AM    K 4.5 03/07/2024 10:06 AM     03/07/2024 10:06 AM    CO2 25 03/07/2024 10:06 AM    BUN 8 03/07/2024 10:06 AM    CREATININE 0.5 03/07/2024 10:06 AM    GFRAA >60 06/26/2020 01:20 PM    AGRATIO 1.6 09/04/2019 12:36 PM    LABGLOM >60 03/07/2024 10:06

## 2024-10-28 NOTE — ANESTHESIA POSTPROCEDURE EVALUATION
Department of Anesthesiology  Postprocedure Note    Patient: Estela Perales  MRN: [id number]  YOB: 1995  Date of evaluation: 10/28/2024    Procedure Summary       Date: 10/28/24 Room / Location: [address]    Anesthesia Start: 1249 Anesthesia Stop: 1355    Procedure: EXCISION OF LEFT AXILLARY CYST (Left: Axilla) Diagnosis:       Sebaceous cyst of left axilla      (Sebaceous cyst of left axilla [L72.3])    Surgeons: Mook Baig II, MD Responsible Provider: Roxane Bolton MD    Anesthesia Type: General ASA Status: 3            Anesthesia Type: General    Trey Phase I: Trey Score: 10    Trey Phase II:      Anesthesia Post Evaluation    Patient location during evaluation: PACU  Patient participation: complete - patient participated  Level of consciousness: awake  Pain score: 0  Airway patency: patent  Nausea & Vomiting: no nausea and no vomiting  Cardiovascular status: blood pressure returned to baseline  Respiratory status: acceptable, face mask and spontaneous ventilation  Hydration status: stable  Pain management: adequate    There were no known notable events for this encounter.

## 2024-10-28 NOTE — H&P
History and Physical Update    Original H&P done in office on 10/17/2024 (less than 30 days ago).    Pt reports the following changes in health since being seen last:  None    Vitals:    10/28/24 1145   BP: (!) 141/74   Pulse: 94   Resp: 16   Temp: 96.8 °F (36 °C)   SpO2: 99%       Alert and oriented x 3, no apparent distress at rest  Atraumatic, normocephalic.  EOMI.  Breathing unlabored.  RRR.  Soft, non-tender, non-distended.  Moves all extremities.   Warm, dry. Left axillary cyst with surrounding erythema      Estela Perales is a 28 yo female here today for left axillary cyst excision      -Consent obtained in office.  -Abx ordered in office.  -Reviewed expected pre-operative, operative, and post-operative courses.  -Answered questions to patient's satisfaction.   -Reviewed risks, benefits, alternative to procedure.   -Proceed as scheduled.        JOAN Connell - CNP

## 2024-10-28 NOTE — PROGRESS NOTES
Outpatient Pharmacy Progress Note for Meds-to-Beds    Total number of Prescriptions Filled: 3    Additional Documentation:  Medication(s) delivered to the patient's room prior to discharge by a volunteer      Thank you for letting us serve your patients.  [address] [address]    Phone: [phone]    Fax: [phone]

## 2024-10-28 NOTE — OP NOTE
Estela Perales  1995  10/28/24      Pre-operative Diagnosis:  Left axillary cyst    Post-operative Diagnosis:  Infected left axillary cyst    Procedure:   1. Incision and drainage of infected left axillary cyst and excision of cyst cavity    Surgeon: Mook Baig II, MD    Assistant: Sonia Armenta CNP. The skilled assistance of the CNP was necessary for the successful completion of this case. She was essential for the proper positioning, manipulation of instruments, proper exposure, manipulation of tissue, and wound closure.    Anesthesia:  General    Findings: Large infected left axillary cyst, chronically appearing    IVF:  Per anesthesia mL    Estimated Blood Loss:  Less than 5 mL                  Specimens: Culture of cyst fluid and also cyst lining sent for pathology and specimen          Complications:  None; patient tolerated the procedure well.           Disposition: PACU - hemodynamically stable.       Operative Details:    The patient was seen again in the pre-operative area. Informed consent was obtained. Again, the risks, benefits, complications, treatment options, and expected outcomes were discussed with the patient.    There was concurrence with the proposed plan.    The site of surgery was properly noted/marked.     The patient was transported to the operating room.     The patient was transferred to the operating room table and placed in the supine position.     The patient was secured to the operating room table with multiple straps and all pressue points were well-padded.     Bilateral sequential compression devices were applied and were working throughout the duration of the case.    Anesthesia was induced without complications or changes in the patient's vital signs.    Appropriate antibiotics were given in accordance with national protocols.    The patient's left axilla was prepped and draped in standard sterile fashion.     A time-out was held with all members of the operating room  team present and in agreement.    1% lidocaine was infiltrated at the surgical site and an elliptical incision was made to remove the cyst with surrounding normal tissue.  There was chronic inflammation around the cyst cavity.    The specimen and defect measured approximately 6 cm.     Hemostasis was acquired using Bovie cautery. The wound was irrigated and hemostatis was once again checked and found to be appropriate.    The incision was closed in interrupted fashion using 3-0 nylon and packed with 1\" iodoform.    At the end of the case, the patient was extubated and transported to the recovery room in stable condition.    At the end of the case, the sponge, instrument, and needle counts were correct times two.    At the end of the case, Dr. Baig personally informed the patient's family of the outcome of the procedure.    Dr. Baig was present, scrubbed, and supervised the entire duration of the surgery.      Mook Baig II, MD

## 2024-10-29 LAB
ALBUMIN SERPL-MCNC: 3.2 G/DL (ref 3.4–5)
ALBUMIN/GLOB SERPL: 1.3 {RATIO} (ref 1.1–2.2)
ALP SERPL-CCNC: 59 U/L (ref 40–129)
ALT SERPL-CCNC: 7 U/L (ref 10–40)
ANION GAP SERPL CALCULATED.3IONS-SCNC: 12 MMOL/L (ref 9–17)
AST SERPL-CCNC: 14 U/L (ref 15–37)
BASOPHILS # BLD: 0.1 K/UL
BASOPHILS NFR BLD: 0 % (ref 0–1)
BILIRUB SERPL-MCNC: 0.3 MG/DL (ref 0–1)
BILIRUB UR QL STRIP: NEGATIVE
BUN SERPL-MCNC: 12 MG/DL (ref 7–20)
CALCIUM SERPL-MCNC: 8.1 MG/DL (ref 8.3–10.6)
CHLORIDE SERPL-SCNC: 100 MMOL/L (ref 99–110)
CLARITY UR: CLEAR
CO2 SERPL-SCNC: 20 MMOL/L (ref 21–32)
COLOR UR: YELLOW
CREAT SERPL-MCNC: 0.7 MG/DL (ref 0.6–1.1)
CRP SERPL HS-MCNC: 50 MG/L (ref 0–5)
EKG ATRIAL RATE: 119 BPM
EKG DIAGNOSIS: NORMAL
EKG P AXIS: 64 DEGREES
EKG P-R INTERVAL: 124 MS
EKG Q-T INTERVAL: 278 MS
EKG QRS DURATION: 70 MS
EKG QTC CALCULATION (BAZETT): 391 MS
EKG R AXIS: 50 DEGREES
EKG T AXIS: 67 DEGREES
EKG VENTRICULAR RATE: 119 BPM
EOSINOPHIL # BLD: 0.03 K/UL
EOSINOPHILS RELATIVE PERCENT: 0 % (ref 0–3)
EPI CELLS #/AREA URNS HPF: 2 /HPF
ERYTHROCYTE [DISTWIDTH] IN BLOOD BY AUTOMATED COUNT: 11.4 % (ref 11.7–14.9)
EST. AVERAGE GLUCOSE BLD GHB EST-MCNC: 194 MG/DL
GFR, ESTIMATED: >90 ML/MIN/1.73M2
GLUCOSE BLD-MCNC: 186 MG/DL (ref 74–99)
GLUCOSE BLD-MCNC: 250 MG/DL (ref 74–99)
GLUCOSE BLD-MCNC: 260 MG/DL (ref 74–99)
GLUCOSE BLD-MCNC: 290 MG/DL (ref 74–99)
GLUCOSE SERPL-MCNC: 233 MG/DL (ref 74–99)
GLUCOSE UR STRIP-MCNC: >=1000 MG/DL
HBA1C MFR BLD: 8.4 % (ref 4.2–6.3)
HCT VFR BLD AUTO: 33.1 % (ref 37–47)
HGB BLD-MCNC: 10.7 G/DL (ref 12.5–16)
HGB UR QL STRIP.AUTO: ABNORMAL
IMM GRANULOCYTES # BLD AUTO: 0.16 K/UL
IMM GRANULOCYTES NFR BLD: 1 %
KETONES UR STRIP-MCNC: 40 MG/DL
LACTATE BLDV-SCNC: 1.3 MMOL/L (ref 0.4–2)
LEUKOCYTE ESTERASE UR QL STRIP: NEGATIVE
LYMPHOCYTES NFR BLD: 0.66 K/UL
LYMPHOCYTES RELATIVE PERCENT: 3 % (ref 24–44)
MCH RBC QN AUTO: 29.6 PG (ref 27–31)
MCHC RBC AUTO-ENTMCNC: 32.3 G/DL (ref 32–36)
MCV RBC AUTO: 91.7 FL (ref 78–100)
MONOCYTES NFR BLD: 1.2 K/UL
MONOCYTES NFR BLD: 5 % (ref 0–4)
MRSA, DNA, NASAL: NOT DETECTED
MUCOUS THREADS URNS QL MICRO: ABNORMAL
NEUTROPHILS NFR BLD: 92 % (ref 36–66)
NEUTS SEG NFR BLD: 24.33 K/UL
NITRITE UR QL STRIP: NEGATIVE
PH UR STRIP: 6 [PH] (ref 5–8)
PHOSPHATE SERPL-MCNC: 1.4 MG/DL (ref 2.5–4.9)
PLATELET # BLD AUTO: 300 K/UL (ref 140–440)
PMV BLD AUTO: 9.5 FL (ref 7.5–11.1)
POTASSIUM SERPL-SCNC: 4.7 MMOL/L (ref 3.5–5.1)
PROCALCITONIN SERPL-MCNC: 11 NG/ML
PROT SERPL-MCNC: 5.7 G/DL (ref 6.4–8.2)
PROT UR STRIP-MCNC: 30 MG/DL
RBC # BLD AUTO: 3.61 M/UL (ref 4.2–5.4)
RBC #/AREA URNS HPF: 7 /HPF (ref 0–2)
SODIUM SERPL-SCNC: 132 MMOL/L (ref 136–145)
SP GR UR STRIP: 1.02 (ref 1–1.03)
SPECIMEN DESCRIPTION: NORMAL
TRICHOMONAS #/AREA URNS HPF: ABNORMAL /[HPF]
UROBILINOGEN UR STRIP-ACNC: 4 EU/DL (ref 0–1)
WBC #/AREA URNS HPF: 2 /HPF (ref 0–5)
WBC OTHER # BLD: 26.5 K/UL (ref 4–10.5)

## 2024-10-29 PROCEDURE — 2580000003 HC RX 258: Performed by: STUDENT IN AN ORGANIZED HEALTH CARE EDUCATION/TRAINING PROGRAM

## 2024-10-29 PROCEDURE — 2500000003 HC RX 250 WO HCPCS: Performed by: INTERNAL MEDICINE

## 2024-10-29 PROCEDURE — 84145 PROCALCITONIN (PCT): CPT

## 2024-10-29 PROCEDURE — 93010 ELECTROCARDIOGRAM REPORT: CPT | Performed by: INTERNAL MEDICINE

## 2024-10-29 PROCEDURE — 93005 ELECTROCARDIOGRAM TRACING: CPT | Performed by: STUDENT IN AN ORGANIZED HEALTH CARE EDUCATION/TRAINING PROGRAM

## 2024-10-29 PROCEDURE — 83605 ASSAY OF LACTIC ACID: CPT

## 2024-10-29 PROCEDURE — 6370000000 HC RX 637 (ALT 250 FOR IP): Performed by: INTERNAL MEDICINE

## 2024-10-29 PROCEDURE — 94761 N-INVAS EAR/PLS OXIMETRY MLT: CPT

## 2024-10-29 PROCEDURE — 80053 COMPREHEN METABOLIC PANEL: CPT

## 2024-10-29 PROCEDURE — 2580000003 HC RX 258: Performed by: INTERNAL MEDICINE

## 2024-10-29 PROCEDURE — 82962 GLUCOSE BLOOD TEST: CPT

## 2024-10-29 PROCEDURE — 81001 URINALYSIS AUTO W/SCOPE: CPT

## 2024-10-29 PROCEDURE — 83036 HEMOGLOBIN GLYCOSYLATED A1C: CPT

## 2024-10-29 PROCEDURE — 6370000000 HC RX 637 (ALT 250 FOR IP): Performed by: STUDENT IN AN ORGANIZED HEALTH CARE EDUCATION/TRAINING PROGRAM

## 2024-10-29 PROCEDURE — 6360000002 HC RX W HCPCS: Performed by: STUDENT IN AN ORGANIZED HEALTH CARE EDUCATION/TRAINING PROGRAM

## 2024-10-29 PROCEDURE — 36415 COLL VENOUS BLD VENIPUNCTURE: CPT

## 2024-10-29 PROCEDURE — 87040 BLOOD CULTURE FOR BACTERIA: CPT

## 2024-10-29 PROCEDURE — 84100 ASSAY OF PHOSPHORUS: CPT

## 2024-10-29 PROCEDURE — 87641 MR-STAPH DNA AMP PROBE: CPT

## 2024-10-29 PROCEDURE — 2140000000 HC CCU INTERMEDIATE R&B

## 2024-10-29 PROCEDURE — 86140 C-REACTIVE PROTEIN: CPT

## 2024-10-29 PROCEDURE — 85025 COMPLETE CBC W/AUTO DIFF WBC: CPT

## 2024-10-29 RX ORDER — INSULIN GLARGINE 100 [IU]/ML
10 INJECTION, SOLUTION SUBCUTANEOUS NIGHTLY
Status: DISCONTINUED | OUTPATIENT
Start: 2024-10-29 | End: 2024-10-31 | Stop reason: HOSPADM

## 2024-10-29 RX ORDER — INSULIN LISPRO 100 [IU]/ML
10 INJECTION, SOLUTION INTRAVENOUS; SUBCUTANEOUS
Status: DISCONTINUED | OUTPATIENT
Start: 2024-10-29 | End: 2024-10-29

## 2024-10-29 RX ORDER — SODIUM CHLORIDE, SODIUM LACTATE, POTASSIUM CHLORIDE, AND CALCIUM CHLORIDE .6; .31; .03; .02 G/100ML; G/100ML; G/100ML; G/100ML
1000 INJECTION, SOLUTION INTRAVENOUS ONCE
Status: COMPLETED | OUTPATIENT
Start: 2024-10-29 | End: 2024-10-29

## 2024-10-29 RX ORDER — INSULIN LISPRO 100 [IU]/ML
10 INJECTION, SOLUTION INTRAVENOUS; SUBCUTANEOUS
Status: DISCONTINUED | OUTPATIENT
Start: 2024-10-29 | End: 2024-10-31 | Stop reason: HOSPADM

## 2024-10-29 RX ORDER — INSULIN GLARGINE 100 [IU]/ML
5 INJECTION, SOLUTION SUBCUTANEOUS ONCE
Status: COMPLETED | OUTPATIENT
Start: 2024-10-29 | End: 2024-10-29

## 2024-10-29 RX ORDER — CALCIUM CARBONATE 500 MG/1
1000 TABLET, CHEWABLE ORAL 3 TIMES DAILY PRN
Status: DISCONTINUED | OUTPATIENT
Start: 2024-10-29 | End: 2024-10-31 | Stop reason: HOSPADM

## 2024-10-29 RX ORDER — INSULIN LISPRO 100 [IU]/ML
0-8 INJECTION, SOLUTION INTRAVENOUS; SUBCUTANEOUS
Status: DISCONTINUED | OUTPATIENT
Start: 2024-10-29 | End: 2024-10-29

## 2024-10-29 RX ADMIN — SODIUM PHOSPHATE, MONOBASIC, MONOHYDRATE AND SODIUM PHOSPHATE, DIBASIC ANHYDROUS 20 MMOL: 142; 276 INJECTION, SOLUTION INTRAVENOUS at 16:56

## 2024-10-29 RX ADMIN — INSULIN LISPRO 4 UNITS: 100 INJECTION, SOLUTION INTRAVENOUS; SUBCUTANEOUS at 12:02

## 2024-10-29 RX ADMIN — SODIUM CHLORIDE 3000 MG: 900 INJECTION INTRAVENOUS at 02:56

## 2024-10-29 RX ADMIN — SODIUM CHLORIDE, PRESERVATIVE FREE 10 ML: 5 INJECTION INTRAVENOUS at 21:26

## 2024-10-29 RX ADMIN — SODIUM CHLORIDE, POTASSIUM CHLORIDE, SODIUM LACTATE AND CALCIUM CHLORIDE 1000 ML: 600; 310; 30; 20 INJECTION, SOLUTION INTRAVENOUS at 01:40

## 2024-10-29 RX ADMIN — INSULIN LISPRO 10 UNITS: 100 INJECTION, SOLUTION INTRAVENOUS; SUBCUTANEOUS at 12:02

## 2024-10-29 RX ADMIN — INSULIN LISPRO 4 UNITS: 100 INJECTION, SOLUTION INTRAVENOUS; SUBCUTANEOUS at 08:46

## 2024-10-29 RX ADMIN — SODIUM CHLORIDE 3000 MG: 900 INJECTION INTRAVENOUS at 09:00

## 2024-10-29 RX ADMIN — CALCIUM CARBONATE 1000 MG: 500 TABLET, CHEWABLE ORAL at 08:45

## 2024-10-29 RX ADMIN — INSULIN HUMAN 5 UNITS: 100 INJECTION, SUSPENSION SUBCUTANEOUS at 10:33

## 2024-10-29 RX ADMIN — INSULIN LISPRO 10 UNITS: 100 INJECTION, SOLUTION INTRAVENOUS; SUBCUTANEOUS at 16:51

## 2024-10-29 RX ADMIN — SODIUM CHLORIDE, PRESERVATIVE FREE 10 ML: 5 INJECTION INTRAVENOUS at 08:46

## 2024-10-29 RX ADMIN — SODIUM CHLORIDE 3000 MG: 900 INJECTION INTRAVENOUS at 21:28

## 2024-10-29 RX ADMIN — ACETAMINOPHEN 650 MG: 325 TABLET ORAL at 08:44

## 2024-10-29 RX ADMIN — INSULIN LISPRO 2 UNITS: 100 INJECTION, SOLUTION INTRAVENOUS; SUBCUTANEOUS at 21:26

## 2024-10-29 RX ADMIN — INSULIN GLARGINE 5 UNITS: 100 INJECTION, SOLUTION SUBCUTANEOUS at 21:26

## 2024-10-29 RX ADMIN — ACETAMINOPHEN 650 MG: 325 TABLET ORAL at 01:43

## 2024-10-29 RX ADMIN — INSULIN LISPRO 4 UNITS: 100 INJECTION, SOLUTION INTRAVENOUS; SUBCUTANEOUS at 16:52

## 2024-10-29 RX ADMIN — SODIUM CHLORIDE 3000 MG: 900 INJECTION INTRAVENOUS at 16:17

## 2024-10-29 RX ADMIN — ENOXAPARIN SODIUM 40 MG: 100 INJECTION SUBCUTANEOUS at 08:45

## 2024-10-29 ASSESSMENT — PAIN DESCRIPTION - ORIENTATION: ORIENTATION: LEFT

## 2024-10-29 ASSESSMENT — PAIN DESCRIPTION - PAIN TYPE: TYPE: ACUTE PAIN

## 2024-10-29 ASSESSMENT — PAIN - FUNCTIONAL ASSESSMENT: PAIN_FUNCTIONAL_ASSESSMENT: PREVENTS OR INTERFERES SOME ACTIVE ACTIVITIES AND ADLS

## 2024-10-29 ASSESSMENT — PAIN SCALES - GENERAL: PAINLEVEL_OUTOF10: 3

## 2024-10-29 ASSESSMENT — PAIN DESCRIPTION - LOCATION: LOCATION: ARM

## 2024-10-29 ASSESSMENT — PAIN DESCRIPTION - ONSET: ONSET: ON-GOING

## 2024-10-29 ASSESSMENT — PAIN DESCRIPTION - DESCRIPTORS: DESCRIPTORS: ACHING

## 2024-10-29 ASSESSMENT — PAIN DESCRIPTION - FREQUENCY: FREQUENCY: INTERMITTENT

## 2024-10-29 NOTE — CARE COORDINATION
Chart reviewed. Pt is from home. Pt is INDP. Pt has PCP and insurance to assist with medical expenses. No needs identified at this time. Cm to remain available if any specific needs arise.    10/29/24 0905   Service Assessment   Patient Orientation Alert and Oriented   Cognition Alert   History Provided By Medical Record   Primary Caregiver Self   Accompanied By/Relationship N/A   Support Systems Family Members   Patient's Healthcare Decision Maker is: Legal Next of Kin   PCP Verified by CM Yes   Last Visit to PCP   (Unknown)   Prior Functional Level Independent in ADLs/IADLs   Current Functional Level Independent in ADLs/IADLs   Can patient return to prior living arrangement Yes   Ability to make needs known: Good   Family able to assist with home care needs: Yes   Would you like for me to discuss the discharge plan with any other family members/significant others, and if so, who? Yes  (Legal next of kin and family as needed)   Financial Resources Medicaid   Community Resources None   CM/SW Referral Other (see comment)  (Discharge planning assessment)

## 2024-10-29 NOTE — PLAN OF CARE
Problem: Discharge Planning  Goal: Discharge to home or other facility with appropriate resources  Outcome: Progressing     Problem: Pain  Goal: Verbalizes/displays adequate comfort level or baseline comfort level  Outcome: Progressing  Flowsheets (Taken 10/28/2024 1412 by Isamar Pineda RN)  Verbalizes/displays adequate comfort level or baseline comfort level: Administer analgesics based on type and severity of pain and evaluate response     Problem: Chronic Conditions and Co-morbidities  Goal: Patient's chronic conditions and co-morbidity symptoms are monitored and maintained or improved  Outcome: Progressing     Problem: Safety - Adult  Goal: Free from fall injury  Outcome: Progressing

## 2024-10-29 NOTE — PROGRESS NOTES
General Surgery-Dr. Baig    [facility] Day: 2    Chief Complaint on Admission: infected left axillary cyst      Subjective:     Pt had uneventful procedure yesterday.  In recovery she had elevated HR and temp and was thus admitted to hospital.   Pt denies issues overnight.  She is tolerating PO.  Had dressing changed.          ROS:  Review of Systems   Cardiovascular:  Negative for chest pain.   Skin:  Positive for wound.       Allergies  Patient has no known allergies.          Diagnosis Date    Chronic kidney disease     Chronic kidney disease (CKD) stage G1/A1, glomerular filtration rate (GFR) equal to or greater than 90 mL/min/1.73 square meter and albuminuria creatinine ratio less than 30 mg/g 2018    Diabetes mellitus (HCC)     DKA (diabetic ketoacidoses)     DKA (diabetic ketoacidoses)     Type 2 diabetes mellitus with hyperosmolarity, uncontrolled (HCC) 2022       Objective:     Vitals:    10/29/24 1120   BP: (!) 112/55   Pulse:    Resp: 18   Temp: 100 °F (37.8 °C)   SpO2: 97%       TEMPERATURE:  Current -Temp: 100 °F (37.8 °C); Max - Temp  Av.1 °F (37.8 °C)  Min: 97 °F (36.1 °C)  Max: 102.7 °F (39.3 °C)    I/O this shift:  In: -   Out: 500 [Urine:500]I/O last 3 completed shifts:  In: 300 [I.V.:300]  Out: 10 [Blood:10]      Physical Exam:  Physical Exam  A&Ox3, NAD at rest.  AT. NC.  Breathing unlabored.  HR in mid 90s on monitor.  Left axilla with dressing in place. No active drainage. Darryn apparent.   RHOADES  Warm, dry.       Scheduled Meds:   insulin glargine  10 Units SubCUTAneous Nightly    insulin lispro  10 Units SubCUTAneous TID WC    sodium chloride flush  5-40 mL IntraVENous 2 times per day    enoxaparin  40 mg SubCUTAneous Daily    ampicillin-sulbactam  3,000 mg IntraVENous Q6H    insulin lispro  0-8 Units SubCUTAneous 4x Daily AC & HS     ContinuousInfusions:   sodium chloride      dextrose       PRN Meds:calcium carbonate, sodium chloride flush, sodium chloride, potassium chloride

## 2024-10-29 NOTE — CONSULTS
Endocrinology   Consult Note  10/28/2024 10:44 AM     Primary Care provider: Roxana Mclaughlin MD     Referring physician:  Tyrese Franz MD     Dear Venecia    Thank You for the Consult     Pt. Was Admitted for : Infectious sebaceous cyst of the axilla    Reason for Consult: Better control of blood glucose      History Obtained From:  Patient/ EMR       HISTORY OF PRESENT ILLNESS:                The patient is a 29 y.o. female with significant past medical history of type 1 diabetes mellitus, chronic kidney disease, legal blindness diabetes multiple admissions for DKA admitted to hospital with infected sebaceous cyst left axilla.  Incision and drainage was done.  She has been running high blood glucose levels.  I was  consulted for better control of blood glucose.       ROS:   Pt's ROS done in detail.  Abnormal ROS are noted in Medical and Surgical History Section below:     Other Medical History:        Diagnosis Date    Chronic kidney disease     Chronic kidney disease (CKD) stage G1/A1, glomerular filtration rate (GFR) equal to or greater than 90 mL/min/1.73 square meter and albuminuria creatinine ratio less than 30 mg/g 1/22/2018    Diabetes mellitus (HCC)     DKA (diabetic ketoacidoses)     DKA (diabetic ketoacidoses)     Type 2 diabetes mellitus with hyperosmolarity, uncontrolled (HCC) 12/8/2022     Surgical History:        Procedure Laterality Date    AXILLARY SURGERY Left 10/28/2024    EXCISION OF LEFT AXILLARY CYST performed by Mook Baig II, MD at [facility] OR    EYE SURGERY         Allergies:  Patient has no known allergies.    Family History:       Problem Relation Age of Onset    Diabetes Mother     High Blood Pressure Mother     Kidney Disease Mother     Vision Loss Mother     Diabetes Type 1  Father     Diabetes Type 1  Brother      REVIEW OF SYSTEMS:  Review of System Done as noted above     PHYSICAL EXAM:      Vitals:    BP (!) 116/54   Pulse (!) 107   Temp 98.6 °F (37 °C) (Oral)   Resp 20    Pt very nauseated today and threw up multiple times approx 750 mls total of brown emesis. -Dilaudid x2  -zofran x2  -ativan x2    Patient educated on new medication ativan for nausea unresponsive to zofran and lipids that will run with TPN. Patient rounded on hourly by myself or patient assistant and encouraged to call with any needs. No signs of distress noted. Bed low, locked, call mcclellan within reach.    Iliana Travis RN

## 2024-10-29 NOTE — PLAN OF CARE
Problem: Discharge Planning  Goal: Discharge to home or other facility with appropriate resources  10/29/2024 1011 by James Ceballos RN  Outcome: Progressing  10/28/2024 2126 by Francis Mccallum RN  Outcome: Progressing     Problem: Pain  Goal: Verbalizes/displays adequate comfort level or baseline comfort level  10/29/2024 1011 by James Ceballos RN  Outcome: Progressing  10/28/2024 2126 by Francis Mccallum RN  Outcome: Progressing  Flowsheets (Taken 10/28/2024 1412 by Isamar Pineda RN)  Verbalizes/displays adequate comfort level or baseline comfort level: Administer analgesics based on type and severity of pain and evaluate response     Problem: Chronic Conditions and Co-morbidities  Goal: Patient's chronic conditions and co-morbidity symptoms are monitored and maintained or improved  10/29/2024 1011 by James Ceballos RN  Outcome: Progressing  10/28/2024 2126 by Francis Mccallum RN  Outcome: Progressing     Problem: Safety - Adult  Goal: Free from fall injury  10/29/2024 1011 by James Ceballos RN  Outcome: Progressing  10/28/2024 2126 by Francis Mccallum RN  Outcome: Progressing

## 2024-10-29 NOTE — PROGRESS NOTES
In-Patient Progress Note    Patient:  Estela Perales 29 y.o. female MRN: [id number]     Date of Service: 10/29/2024    Hospital Day: 2      Chief complaint: had no chief complaint listed for this encounter.      Assessment and Plan   Estela Perales, a 29 y.o. female, with a history of type 1 DM was admitted on 10/28/2024 with complaints of had no chief complaint listed for this encounter.    Assessment and plan    Infected left axillary cyst  Post op fever  -patient reportedly afebrile prior to admission but spike fever post operatively only - which was unexpected. Procal 11.0, CRP 50. Started on unasyn.  -follow cultures    Type 1 DM  -consult endocrinology     Essential HTN  -continue lisinopril              Diet ADULT DIET; Regular   DVT Prophylaxis [] Lovenox, []  Heparin, [] SCDs, [] Ambulation,  [] Eliquis, [] Xarelto  [] Coumadin   Peptic ulcer prophylaxis -   Code Status Full Code   Disposition From / Current living situation : home  Expected Disposition: home  Estimated Date of Discharge: 2 days  Patient requires continued admission due to pending cultures   Surrogate Decision Maker/ POA -       Personally reviewed Lab Studies and Imaging       Subjective:     Chief Complaint: No chief complaint on file.       No new complaints       Review of System     Review of Systems  negative    I have reviewed all pertinent PMHx, PSHx, FamHx, SocialHx, medications, and allergies and updated history as appropriate.    Physical Exam   VITAL SIGNS:  /65   Pulse (!) 113   Temp 99.2 °F (37.3 °C) (Oral)   Resp 18   Ht 1.651 m (5' 5\")   Wt 68 kg (150 lb)   LMP 2024 (Approximate)   SpO2 97%   BMI 24.96 kg/m²   Tmax over 24 hours:  Temp (24hrs), Av.3 °F (37.9 °C), Min:98.6 °F (37 °C), Max:102.7 °F (39.3 °C)      Patient Vitals for the past 6 hrs:   BP Temp Temp src Resp SpO2   10/29/24 1500 107/65 99.2 °F (37.3 °C) Oral -- 97 %   10/29/24 1120 (!) 112/55 100 °F (37.8 °C) Oral 18 97 %

## 2024-10-29 NOTE — CONSULTS
tablet by mouth daily 90 tablet 3    Continuous Glucose  (DEXCOM G7 ) KELVIN For use with Dexcom G7 sensors for continuous glucose monitoring. 1 each 0    Continuous Glucose Sensor (DEXCOM G7 SENSOR) MISC Apply new sensor every 10 days as directed for continuous glucose monitoring. 3 each 11    Alcohol Swabs PADS Use as directed for blood sugar testing 4 times per day. 150 each 11    Insulin Pen Needle 32G X 4 MM MISC Use new pen needle with each insulin dose 2 times daily. 60 each 5    blood glucose test strips (ACCU-CHEK GUIDE) strip 1 each by Does not apply route 4 times daily (with meals and nightly) 120 each 5    blood glucose monitor kit and supplies Dispense 1 glucometer starter kit for blood sugar testing 4 times daily. 1 kit 0    blood glucose monitor strips Test blood sugars 4 times a day 150 strip 11    [employer] Ultra Thin Lancets MISC For blood sugar testing 4 times per day. 150 each 11    Lancet Devices (ADJUSTABLE LANCING DEVICE) MISC Use as directed for blood glucose monitoring. 1 each 0    INSULIN SYRINGE .5CC/29G ([employer] INS SYR .5CC/29G) 29G X 1/2\" 0.5 ML MISC 1 each by Does not apply route 2 times daily 200 each 11    glucose 4 g chewable tablet Take 4 tablets by mouth as needed for Low blood sugar 30 tablet 0    Elastic Bandages & Supports (JOBST KNEE HIGH COMPRESSION SM) MISC 1 each by Does not apply route daily as needed (edema legs) 2 each 2    Lancets MISC 1 each by Does not apply route 3 times daily 300 each 2         Objective:      /79   Pulse (!) 113   Temp 99.4 °F (37.4 °C) (Oral)   Resp 17   Ht 1.651 m (5' 5\")   Wt 68 kg (150 lb)   LMP 08/11/2024 (Approximate)   SpO2 97%   BMI 24.96 kg/m²   Navid Risk Score: Navid Scale Score: 20    LABS    CBC:   Lab Results   Component Value Date/Time    WBC 26.5 10/29/2024 01:45 AM    RBC 3.61 10/29/2024 01:45 AM    HGB 10.7 10/29/2024 01:45 AM    HCT 33.1 10/29/2024 01:45 AM    MCV 91.7 10/29/2024 01:45 AM    MCH 29.6  Sutures intact and well approximated at edges. Discussed how to pack area and stated that grandmother would change dressing and packing.     Specialty Bed Required : no  [] Low Air Loss   [] Pressure Redistribution  [] Fluid Immersion  [] Bariatric  [] Total Pressure Relief  [] Other:     Discharge Plan:  Placement for patient upon discharge: to be determined   Hospice Care: not at this time   Patient appropriate for Outpatient Wound Care Center: to follow up with Dr Baig    Patient/Caregiver Teaching:  Level of patient/caregiver understanding able to:   Voiced understanding        Electronically signed by María Elena Zaidi RN, on 10/29/2024 at 10:32 AM

## 2024-10-29 NOTE — PROGRESS NOTES
4 Eyes Skin Assessment     NAME:  Estela Perales  YOB: 1995  MEDICAL RECORD NUMBER:  [id number]    The patient is being assessed for  Admission    I agree that at least one RN has performed a thorough Head to Toe Skin Assessment on the patient. ALL assessment sites listed below have been assessed.      Areas assessed by both nurses:    Head, Face, Ears, Shoulders, Back, Chest, Arms, Elbows, Hands, Sacrum. Buttock, Coccyx, Ischium, Legs. Feet and Heels, and Under Medical Devices         Does the Patient have a Wound? Yes wound(s) were present on assessment. LDA wound assessment was Initiated and completed by RN       Navid Prevention initiated by RN: Yes  Wound Care Orders initiated by RN: Yes    Pressure Injury (Stage 3,4, Unstageable, DTI, NWPT, and Complex wounds) if present, place Wound referral order by RN under : No    New Ostomies, if present place, Ostomy referral order under : No     Nurse 1 eSignature: Electronically signed by Francis Mccallum RN on 10/28/24 at 9:40 PM EDT    **SHARE this note so that the co-signing nurse can place an eSignature**    Nurse 2 eSignature: Electronically signed by Nora Black RN on 10/28/24 at 10:27 PM EDT

## 2024-10-30 LAB
ERYTHROCYTE [DISTWIDTH] IN BLOOD BY AUTOMATED COUNT: 11.5 % (ref 11.7–14.9)
GLUCOSE BLD-MCNC: 215 MG/DL (ref 74–99)
GLUCOSE BLD-MCNC: 273 MG/DL (ref 74–99)
GLUCOSE BLD-MCNC: 294 MG/DL (ref 74–99)
HCT VFR BLD AUTO: 34 % (ref 37–47)
HGB BLD-MCNC: 10.8 G/DL (ref 12.5–16)
MCH RBC QN AUTO: 28.6 PG (ref 27–31)
MCHC RBC AUTO-ENTMCNC: 31.8 G/DL (ref 32–36)
MCV RBC AUTO: 89.9 FL (ref 78–100)
PLATELET # BLD AUTO: 324 K/UL (ref 140–440)
PMV BLD AUTO: 9.9 FL (ref 7.5–11.1)
PROCALCITONIN SERPL-MCNC: 7.22 NG/ML
RBC # BLD AUTO: 3.78 M/UL (ref 4.2–5.4)
WBC OTHER # BLD: 13 K/UL (ref 4–10.5)

## 2024-10-30 PROCEDURE — 6370000000 HC RX 637 (ALT 250 FOR IP): Performed by: STUDENT IN AN ORGANIZED HEALTH CARE EDUCATION/TRAINING PROGRAM

## 2024-10-30 PROCEDURE — 85027 COMPLETE CBC AUTOMATED: CPT

## 2024-10-30 PROCEDURE — 94761 N-INVAS EAR/PLS OXIMETRY MLT: CPT

## 2024-10-30 PROCEDURE — 36415 COLL VENOUS BLD VENIPUNCTURE: CPT

## 2024-10-30 PROCEDURE — 2140000000 HC CCU INTERMEDIATE R&B

## 2024-10-30 PROCEDURE — 82962 GLUCOSE BLOOD TEST: CPT

## 2024-10-30 PROCEDURE — 6360000002 HC RX W HCPCS: Performed by: STUDENT IN AN ORGANIZED HEALTH CARE EDUCATION/TRAINING PROGRAM

## 2024-10-30 PROCEDURE — 2580000003 HC RX 258: Performed by: STUDENT IN AN ORGANIZED HEALTH CARE EDUCATION/TRAINING PROGRAM

## 2024-10-30 PROCEDURE — 84145 PROCALCITONIN (PCT): CPT

## 2024-10-30 PROCEDURE — 6370000000 HC RX 637 (ALT 250 FOR IP): Performed by: INTERNAL MEDICINE

## 2024-10-30 RX ORDER — INSULIN GLARGINE 100 [IU]/ML
5 INJECTION, SOLUTION SUBCUTANEOUS ONCE
Status: COMPLETED | OUTPATIENT
Start: 2024-10-30 | End: 2024-10-30

## 2024-10-30 RX ADMIN — ENOXAPARIN SODIUM 40 MG: 100 INJECTION SUBCUTANEOUS at 08:09

## 2024-10-30 RX ADMIN — INSULIN LISPRO 4 UNITS: 100 INJECTION, SOLUTION INTRAVENOUS; SUBCUTANEOUS at 12:18

## 2024-10-30 RX ADMIN — SODIUM CHLORIDE, PRESERVATIVE FREE 10 ML: 5 INJECTION INTRAVENOUS at 08:10

## 2024-10-30 RX ADMIN — INSULIN LISPRO 4 UNITS: 100 INJECTION, SOLUTION INTRAVENOUS; SUBCUTANEOUS at 17:08

## 2024-10-30 RX ADMIN — SODIUM CHLORIDE 3000 MG: 900 INJECTION INTRAVENOUS at 20:08

## 2024-10-30 RX ADMIN — INSULIN LISPRO 10 UNITS: 100 INJECTION, SOLUTION INTRAVENOUS; SUBCUTANEOUS at 08:10

## 2024-10-30 RX ADMIN — INSULIN LISPRO 10 UNITS: 100 INJECTION, SOLUTION INTRAVENOUS; SUBCUTANEOUS at 12:18

## 2024-10-30 RX ADMIN — SODIUM CHLORIDE 3000 MG: 900 INJECTION INTRAVENOUS at 08:16

## 2024-10-30 RX ADMIN — SODIUM CHLORIDE, PRESERVATIVE FREE 10 ML: 5 INJECTION INTRAVENOUS at 20:08

## 2024-10-30 RX ADMIN — INSULIN LISPRO 2 UNITS: 100 INJECTION, SOLUTION INTRAVENOUS; SUBCUTANEOUS at 08:10

## 2024-10-30 RX ADMIN — SODIUM CHLORIDE 3000 MG: 900 INJECTION INTRAVENOUS at 15:01

## 2024-10-30 RX ADMIN — INSULIN GLARGINE 5 UNITS: 100 INJECTION, SOLUTION SUBCUTANEOUS at 20:52

## 2024-10-30 RX ADMIN — SODIUM CHLORIDE 3000 MG: 900 INJECTION INTRAVENOUS at 03:11

## 2024-10-30 RX ADMIN — INSULIN LISPRO 2 UNITS: 100 INJECTION, SOLUTION INTRAVENOUS; SUBCUTANEOUS at 20:09

## 2024-10-30 RX ADMIN — INSULIN LISPRO 10 UNITS: 100 INJECTION, SOLUTION INTRAVENOUS; SUBCUTANEOUS at 17:09

## 2024-10-30 ASSESSMENT — PAIN SCALES - GENERAL
PAINLEVEL_OUTOF10: 0
PAINLEVEL_OUTOF10: 0

## 2024-10-30 NOTE — PLAN OF CARE
Problem: Discharge Planning  Goal: Discharge to home or other facility with appropriate resources  Outcome: Progressing  Flowsheets (Taken 10/29/2024 2136 by Erendira John, RN)  Discharge to home or other facility with appropriate resources: Identify barriers to discharge with patient and caregiver     Problem: Discharge Planning  Goal: Discharge to home or other facility with appropriate resources  Outcome: Progressing  Flowsheets (Taken 10/29/2024 2136 by Erendira John, RN)  Discharge to home or other facility with appropriate resources: Identify barriers to discharge with patient and caregiver     Problem: Pain  Goal: Verbalizes/displays adequate comfort level or baseline comfort level  Outcome: Progressing     Problem: Chronic Conditions and Co-morbidities  Goal: Patient's chronic conditions and co-morbidity symptoms are monitored and maintained or improved  Outcome: Progressing     Problem: Safety - Adult  Goal: Free from fall injury  Outcome: Progressing

## 2024-10-30 NOTE — PROGRESS NOTES
Resp 11   Ht 1.651 m (5' 5\")   Wt 68 kg (150 lb)   LMP 08/11/2024 (Approximate)   SpO2 97%   BMI 24.96 kg/m²   General appearance: alert and cooperative with exam  Patient with diminished vision in both eyes to the point of possible legal blindness???  Neck: no JVD or bruit  Thyroid : Normal lobes   Lungs: Has Vesicular Breath sounds   Heart:  regular rate and rhythm  Abdomen: soft, non-tender; bowel sounds normal; no masses,  no organomegaly  Musculoskeletal: Normal  Extremities: extremities normal, , no edema  Left axilla-incision and drainage done for infectious sebaceous cyst  Neurologic:  Awake, alert, oriented to name, place and time.  Cranial nerves II-XII are grossly intact.  Motor is  intact.  Sensory neuropathy,  and gait is normal.    Assessment:     Patient Active Problem List:     Type 1 diabetes mellitus with diabetic polyneuropathy (HCC)     DM (diabetes mellitus) (HCC)     Chronic kidney disease (CKD) stage G1/A1, glomerular filtration rate (GFR) equal to or greater than 90 mL/min/1.73 square meter and albuminuria creatinine ratio less than 30 mg/g     Intellectual disability     Illiterate     Localized swelling of both lower legs     Hypoglycemia     Microalbuminuria     Sebaceous cyst of left axilla     Postoperative fever      Plan:     Reviewed POC blood glucose . Labs and X ray results   Reviewed Current Medicines   On meal/ Correction bolus Humalog/ Basal Lantus Insulin regime   Monitor Blood glucose frequently   Modified  the dose of Insulin/ other medicines as needed   Will follow     .     SHASHI Powell MD,

## 2024-10-30 NOTE — PROGRESS NOTES
In-Patient Progress Note    Patient:  Estela Perales 29 y.o. female MRN: [id number]     Date of Service: 10/30/2024    Hospital Day: 3      Chief complaint: had no chief complaint listed for this encounter.      Assessment and Plan   Estela Perales, a 29 y.o. female, with a history of type 1 DM was admitted on 10/28/2024 with complaints of had no chief complaint listed for this encounter.    Assessment and plan    Infected left axillary cyst  Post op fever  -patient reportedly afebrile prior to admission but spike fever post operatively only - which was unexpected. Procal 11.0, CRP 50. Started on unasyn.  -follow cultures - discharge once culture is negative for 48 hours - likely tomorrow    Type 1 DM  -consult endocrinology     Essential HTN  -continue lisinopril              Diet ADULT DIET; Regular   DVT Prophylaxis [] Lovenox, []  Heparin, [] SCDs, [] Ambulation,  [] Eliquis, [] Xarelto  [] Coumadin   Peptic ulcer prophylaxis -   Code Status Full Code   Disposition From / Current living situation : home  Expected Disposition: home  Estimated Date of Discharge: tomorrow  Patient requires continued admission due to pending cultures   Surrogate Decision Maker/ POA -       Personally reviewed Lab Studies and Imaging       Subjective:     Chief Complaint: No chief complaint on file.       No new complaints       Review of System     Review of Systems  negative    I have reviewed all pertinent PMHx, PSHx, FamHx, SocialHx, medications, and allergies and updated history as appropriate.    Physical Exam   VITAL SIGNS:  /66   Pulse (!) 106   Temp 98 °F (36.7 °C) (Oral)   Resp 24   Ht 1.651 m (5' 5\")   Wt 70.4 kg (155 lb 4.8 oz)   LMP 2024 (Approximate)   SpO2 98%   BMI 25.84 kg/m²   Tmax over 24 hours:  Temp (24hrs), Av.1 °F (36.7 °C), Min:97.9 °F (36.6 °C), Max:98.3 °F (36.8 °C)      Patient Vitals for the past 6 hrs:   BP Temp Temp src Pulse Resp SpO2   10/30/24 1624 -- 98 °F (36.7  °C) Oral -- -- --   10/30/24 1220 121/66 -- -- (!) 106 24 98 %         Intake/Output Summary (Last 24 hours) at 10/30/2024 1709  Last data filed at 10/30/2024 0446  Gross per 24 hour   Intake 1564.57 ml   Output --   Net 1564.57 ml     Wt Readings from Last 2 Encounters:   10/30/24 70.4 kg (155 lb 4.8 oz)   10/17/24 68.9 kg (152 lb)     Body mass index is 25.84 kg/m².      Physical Exam  Constitutional:       General: She is not in acute distress.     Appearance: She is well-developed.   HENT:      Head: Normocephalic and atraumatic.      Right Ear: External ear normal.      Left Ear: External ear normal.      Nose: Nose normal.   Eyes:      General: No scleral icterus.        Right eye: No discharge.         Left eye: No discharge.      Conjunctiva/sclera: Conjunctivae normal.      Pupils: Pupils are equal, round, and reactive to light.   Neck:      Thyroid: No thyromegaly.      Vascular: No JVD.      Trachea: No tracheal deviation.   Cardiovascular:      Rate and Rhythm: Normal rate and regular rhythm.      Heart sounds: Normal heart sounds. No murmur heard.     No friction rub. No gallop.   Pulmonary:      Effort: Pulmonary effort is normal. No respiratory distress.      Breath sounds: Normal breath sounds. No stridor. No wheezing or rales.   Chest:      Chest wall: No tenderness.   Abdominal:      General: Bowel sounds are normal. There is no distension.      Palpations: Abdomen is soft. There is no mass.      Tenderness: There is no abdominal tenderness. There is no guarding or rebound.      Hernia: No hernia is present.   Genitourinary:     Vagina: Normal.   Musculoskeletal:         General: No tenderness or deformity. Normal range of motion.      Cervical back: Normal range of motion and neck supple.   Lymphadenopathy:      Cervical: No cervical adenopathy.   Skin:     General: Skin is warm and dry.      Capillary Refill: Capillary refill takes less than 2 seconds.      Coloration: Skin is not pale.

## 2024-10-31 VITALS
BODY MASS INDEX: 25.87 KG/M2 | DIASTOLIC BLOOD PRESSURE: 76 MMHG | HEIGHT: 65 IN | RESPIRATION RATE: 18 BRPM | HEART RATE: 102 BPM | OXYGEN SATURATION: 98 % | TEMPERATURE: 98 F | SYSTOLIC BLOOD PRESSURE: 117 MMHG | WEIGHT: 155.3 LBS

## 2024-10-31 LAB
GLUCOSE BLD-MCNC: 259 MG/DL (ref 74–99)
GLUCOSE BLD-MCNC: 387 MG/DL (ref 74–99)
MICROORGANISM SPEC CULT: NORMAL
MICROORGANISM SPEC CULT: NORMAL
SERVICE CMNT-IMP: NORMAL
SERVICE CMNT-IMP: NORMAL
SPECIMEN DESCRIPTION: NORMAL
SPECIMEN DESCRIPTION: NORMAL

## 2024-10-31 PROCEDURE — 6370000000 HC RX 637 (ALT 250 FOR IP): Performed by: STUDENT IN AN ORGANIZED HEALTH CARE EDUCATION/TRAINING PROGRAM

## 2024-10-31 PROCEDURE — 94761 N-INVAS EAR/PLS OXIMETRY MLT: CPT

## 2024-10-31 PROCEDURE — 2580000003 HC RX 258: Performed by: STUDENT IN AN ORGANIZED HEALTH CARE EDUCATION/TRAINING PROGRAM

## 2024-10-31 PROCEDURE — 6360000002 HC RX W HCPCS: Performed by: STUDENT IN AN ORGANIZED HEALTH CARE EDUCATION/TRAINING PROGRAM

## 2024-10-31 PROCEDURE — 6370000000 HC RX 637 (ALT 250 FOR IP): Performed by: INTERNAL MEDICINE

## 2024-10-31 PROCEDURE — 82962 GLUCOSE BLOOD TEST: CPT

## 2024-10-31 RX ADMIN — INSULIN LISPRO 8 UNITS: 100 INJECTION, SOLUTION INTRAVENOUS; SUBCUTANEOUS at 08:23

## 2024-10-31 RX ADMIN — SODIUM CHLORIDE, PRESERVATIVE FREE 10 ML: 5 INJECTION INTRAVENOUS at 08:31

## 2024-10-31 RX ADMIN — INSULIN LISPRO 10 UNITS: 100 INJECTION, SOLUTION INTRAVENOUS; SUBCUTANEOUS at 12:32

## 2024-10-31 RX ADMIN — INSULIN LISPRO 2 UNITS: 100 INJECTION, SOLUTION INTRAVENOUS; SUBCUTANEOUS at 12:32

## 2024-10-31 RX ADMIN — SODIUM CHLORIDE 3000 MG: 900 INJECTION INTRAVENOUS at 02:56

## 2024-10-31 RX ADMIN — ENOXAPARIN SODIUM 40 MG: 100 INJECTION SUBCUTANEOUS at 08:24

## 2024-10-31 RX ADMIN — INSULIN LISPRO 10 UNITS: 100 INJECTION, SOLUTION INTRAVENOUS; SUBCUTANEOUS at 08:23

## 2024-10-31 ASSESSMENT — PAIN SCALES - GENERAL
PAINLEVEL_OUTOF10: 0

## 2024-10-31 NOTE — PROGRESS NOTES
General Surgery-Dr. Baig    [facility] Day: 4    Chief Complaint on Admission: infected left axillary cyst      Subjective:     Seen by Endo.  Tolerating PO.  Had dressing changed.   Denies pain / F/C.           ROS:  Review of Systems   Cardiovascular:  Negative for chest pain.   Skin:  Positive for wound.       Allergies  Patient has no known allergies.          Diagnosis Date    Chronic kidney disease     Chronic kidney disease (CKD) stage G1/A1, glomerular filtration rate (GFR) equal to or greater than 90 mL/min/1.73 square meter and albuminuria creatinine ratio less than 30 mg/g 2018    Diabetes mellitus (HCC)     DKA (diabetic ketoacidoses)     DKA (diabetic ketoacidoses)     Type 2 diabetes mellitus with hyperosmolarity, uncontrolled ([facility]) 2022       Objective:     Vitals:    10/31/24 0831   BP: 123/69   Pulse: (!) 102   Resp: 18   Temp: 97.8 °F (36.6 °C)   SpO2:        TEMPERATURE:  Current -Temp: 97.8 °F (36.6 °C); Max - Temp  Av.3 °F (36.8 °C)  Min: 97.8 °F (36.6 °C)  Max: 98.8 °F (37.1 °C)    No intake/output data recorded.I/O last 3 completed shifts:  In: 1564.6 [I.V.:485.1; IV Piggyback:1079.5]  Out: -       Physical Exam:  Physical Exam  A&Ox3, NAD at rest.  AT. NC.  Breathing unlabored.  HR in mid 90s on monitor.  Left axilla with dressing in place. No active drainage. Darryn apparent.   RHOADES  Warm, dry.       Scheduled Meds:   insulin NPH  3 Units SubCUTAneous Once    insulin glargine  10 Units SubCUTAneous Nightly    insulin lispro  10 Units SubCUTAneous TID WC    sodium chloride flush  5-40 mL IntraVENous 2 times per day    enoxaparin  40 mg SubCUTAneous Daily    ampicillin-sulbactam  3,000 mg IntraVENous Q6H    insulin lispro  0-8 Units SubCUTAneous 4x Daily AC & HS     ContinuousInfusions:   sodium chloride      dextrose       PRN Meds:calcium carbonate, sodium chloride flush, sodium chloride, magnesium sulfate, ondansetron **OR** ondansetron, polyethylene glycol, acetaminophen

## 2024-10-31 NOTE — PLAN OF CARE
Problem: Discharge Planning  Goal: Discharge to home or other facility with appropriate resources  10/31/2024 1401 by Meri Perea RN  Outcome: Completed  10/31/2024 1401 by Meri Perea RN  Outcome: Adequate for Discharge     Problem: Pain  Goal: Verbalizes/displays adequate comfort level or baseline comfort level  10/31/2024 1401 by Meri Perea RN  Outcome: Completed  10/31/2024 1401 by Meri Perea RN  Outcome: Adequate for Discharge     Problem: Chronic Conditions and Co-morbidities  Goal: Patient's chronic conditions and co-morbidity symptoms are monitored and maintained or improved  Outcome: Completed     Problem: Safety - Adult  Goal: Free from fall injury  Outcome: Completed     Problem: ABCDS Injury Assessment  Goal: Absence of physical injury  Outcome: Completed

## 2024-10-31 NOTE — PROGRESS NOTES
Outpatient Pharmacy Progress Note for Meds-to-Beds    Total number of Prescriptions Filled: 4    Additional Documentation:  Previous medications from Monday in SDS given to patient.     Thank you for letting us serve your patients.  [facility] - [address] [address]    Phone: [phone]    Fax: [phone]

## 2024-10-31 NOTE — PROGRESS NOTES
Progress Note( Dr. Powell)  10/31/2024  Subjective:   Admit Date: 10/28/2024  PCP: Roxana Mclaughlin MD    Admitted For :Infectious sebaceous cyst of the axilla left side    Consulted For: Better control of blood glucose     Interval History: Blood glucose somewhat better overall she feels better  Running a bit higher blood glucose over 300 yesterday  White cells are coming down now to normal range    Denies any chest pains,   Denies SOB .   Denies nausea or vomiting.   No new bowel or bladder symptoms.     No intake or output data in the 24 hours ending 10/31/24 0847      DATA    CBC:   Recent Labs     10/29/24  0145 10/30/24  1554   WBC 26.5* 13.0*   HGB 10.7* 10.8*    324    CMP:  Recent Labs     10/29/24  0145   *   K 4.7      CO2 20*   BUN 12   CREATININE 0.7   CALCIUM 8.1*   BILITOT 0.3   ALKPHOS 59   AST 14*   ALT 7*     Lipids:   Lab Results   Component Value Date/Time    CHOL 195 11/29/2022 04:11 PM    CHOL 193 03/01/2021 12:00 AM    HDL 41 11/29/2022 04:11 PM    TRIG 139 11/29/2022 04:11 PM     Glucose:  Recent Labs     10/30/24  1127 10/30/24  1620 10/31/24  0812   POCGLU 273* 294* 387*     [id number]:  Lab Results   Component Value Date/Time    LABA1C 8.4 10/29/2024 08:17 AM     High Sensitivity TSH: No results found for: \"TSHHS\"  Free T3: No results found for: \"FT3\"  Free T4:No results found for: \"T4FREE\"    XR CHEST PORTABLE   Final Result   No acute findings in the chest         Electronically signed by Roberto Phillips           Scheduled Medicines   Medications:    insulin NPH  3 Units SubCUTAneous Once    insulin glargine  10 Units SubCUTAneous Nightly    insulin lispro  10 Units SubCUTAneous TID WC    sodium chloride flush  5-40 mL IntraVENous 2 times per day    enoxaparin  40 mg SubCUTAneous Daily    ampicillin-sulbactam  3,000 mg IntraVENous Q6H    insulin lispro  0-8 Units SubCUTAneous 4x Daily AC & HS      Infusions:    sodium chloride      dextrose           Objective:

## 2024-11-01 ENCOUNTER — TELEPHONE (OUTPATIENT)
Dept: FAMILY MEDICINE CLINIC | Age: 29
End: 2024-11-01

## 2024-11-01 NOTE — TELEPHONE ENCOUNTER
Care Transitions Initial Follow Up Call    Outreach made within 2 business days of discharge: Yes    Patient: Estela Perales Patient : 1995   MRN: [id number]  Reason for Admission: Sebaceous Cyst of Left axilla   Discharge Date: 10/31/24       Spoke with: Estela     Discharge department/facility: [facility]    TCM Interactive Patient Contact:  Was patient able to fill all prescriptions: Yes  Was patient instructed to bring all medications to the follow-up visit: Yes  Is patient taking all medications as directed in the discharge summary? Yes  Does patient understand their discharge instructions: Yes  Does patient have questions or concerns that need addressed prior to 7-14 day follow up office visit: no    Additional needs identified to be addressed with provider  No needs identified             Scheduled appointment with PCP within 7-14 days    Follow Up  Future Appointments   Date Time Provider Department Center   2024  1:00 PM Mook Baig II, MD [facility]   4/3/2025  3:15 PM Roxana Mclaughlin MD [facility]       Yulia Dixon MA

## 2024-11-03 LAB
MICROORGANISM SPEC CULT: NORMAL
MICROORGANISM SPEC CULT: NORMAL
SERVICE CMNT-IMP: NORMAL
SERVICE CMNT-IMP: NORMAL
SPECIMEN DESCRIPTION: NORMAL
SPECIMEN DESCRIPTION: NORMAL

## 2024-11-04 ENCOUNTER — TELEPHONE (OUTPATIENT)
Dept: WOUND CARE | Age: 29
End: 2024-11-04

## 2024-11-05 ENCOUNTER — OFFICE VISIT (OUTPATIENT)
Dept: FAMILY MEDICINE CLINIC | Age: 29
End: 2024-11-05
Payer: COMMERCIAL

## 2024-11-05 VITALS
OXYGEN SATURATION: 99 % | WEIGHT: 156.6 LBS | BODY MASS INDEX: 26.09 KG/M2 | HEART RATE: 95 BPM | SYSTOLIC BLOOD PRESSURE: 100 MMHG | TEMPERATURE: 98.6 F | RESPIRATION RATE: 17 BRPM | HEIGHT: 65 IN | DIASTOLIC BLOOD PRESSURE: 72 MMHG

## 2024-11-05 DIAGNOSIS — T14.8XXA OPEN WOUND: ICD-10-CM

## 2024-11-05 DIAGNOSIS — L72.3 SEBACEOUS CYST OF LEFT AXILLA: ICD-10-CM

## 2024-11-05 DIAGNOSIS — R50.82 POSTOPERATIVE FEVER: ICD-10-CM

## 2024-11-05 DIAGNOSIS — Z09 HOSPITAL DISCHARGE FOLLOW-UP: Primary | ICD-10-CM

## 2024-11-05 DIAGNOSIS — N63.10 MASS OF RIGHT BREAST, UNSPECIFIED QUADRANT: ICD-10-CM

## 2024-11-05 DIAGNOSIS — E10.42 TYPE 1 DIABETES MELLITUS WITH DIABETIC POLYNEUROPATHY (HCC): ICD-10-CM

## 2024-11-05 PROCEDURE — 99215 OFFICE O/P EST HI 40 MIN: CPT | Performed by: FAMILY MEDICINE

## 2024-11-05 PROCEDURE — 1111F DSCHRG MED/CURRENT MED MERGE: CPT | Performed by: FAMILY MEDICINE

## 2024-11-05 RX ORDER — HYDROCODONE BITARTRATE AND ACETAMINOPHEN 5; 325 MG/1; MG/1
1 TABLET ORAL EVERY 6 HOURS PRN
COMMUNITY

## 2024-11-05 RX ORDER — PREGABALIN 75 MG/1
75 CAPSULE ORAL 2 TIMES DAILY
Qty: 60 CAPSULE | Refills: 0 | Status: SHIPPED | OUTPATIENT
Start: 2024-11-05 | End: 2024-12-05

## 2024-11-05 NOTE — PROGRESS NOTES
Post-Discharge Transitional Care  Follow Up      Estela Perales   YOB: 1995    Date of Office Visit:  11/5/2024  Date of Hospital Admission: 10/28/24  Date of Hospital Discharge: 10/31/24  Risk of hospital readmission (high >=14%. Medium >=10%) :Readmission Risk Score: 12.3      Care management risk score Rising risk (score 2-5) and Complex Care (Scores >=6): No Risk Score On File     Non face to face  following discharge, date last encounter closed (first attempt may have been earlier): 11/01/2024    Call initiated 2 business days of discharge: Yes    ASSESSMENT/PLAN:   Hospital discharge follow-up  -     TX DISCHARGE MEDS RECONCILED W/ CURRENT OUTPATIENT MED LIST  Type 1 diabetes mellitus with diabetic polyneuropathy (HCC)  -     pregabalin (LYRICA) 75 MG capsule; Take 1 capsule by mouth 2 times daily for 60 doses. Max Daily Amount: 150 mg, Disp-60 capsule, R-0Normal  Sebaceous cyst of left axilla  Postoperative fever  Mass of right breast, unspecified quadrant  -     MILAGROS MELISSA DIGITAL DIAGNOSTIC UNILATERAL RIGHT; Future  Open wound      Medical Decision Making: moderate complexity  Return if symptoms worsen or fail to improve.    On this date 11/5/2024 I have spent 30 minutes reviewing previous notes, test results and face to face with the patient discussing the diagnosis and importance of compliance with the treatment plan as well as documenting on the day of the visit.       Subjective:   HPI:  Follow up of Hospital problems/diagnosis(es):     Inpatient course: Discharge summary reviewed- see chart.    Interval history/Current status: doing ok.  Here with cousin.  Surgery post op visit got postponed to 11/12.  Goes to wound clinic on 11/8.  Had visiting nurse check her wound at home this weekend.  No F, chills, sweats.  Discovered mass in right breast few days ago    Feet hurt: is there  anything that can be taken to help with the pain, especially at night    Patient Active Problem List

## 2024-11-05 NOTE — DISCHARGE SUMMARY
Discharge Summary    Name:  Estela Perales /Age/Sex: 1995  (29 y.o. female)   MRN & CSN:  [id number] & [id number] Admission Date/Time: 10/28/2024 10:44 AM   Attending:  No att. providers found Discharging Physician: Geneva Crowder MD     Discharge diagnosis and plan:  Estela Perales is a 29 y.o. female with pmh of type 1 diabetes mellitus who presented to the hospital for elective surgical removal of suspected infected left axillary cyst.  Patient appeared to tolerate the surgery uneventfully but in the postop area, patient started to experience tachycardia with fever up to 102.8 F, and her blood pressure started to drop to around 100/60s from 140s mmHg systolic.  Patient temperature improved to 100 F after Tylenol 1 g.  Patient's heart rate 108-115/min on my evaluation.  Patient received Ancef prophylactic dose for surgery. Medicine service was consulted for admission of the patient for concern for sepsis     Infected left axillary cyst  Post op fever  Ppatient reportedly afebrile prior to admission but spike fever post operatively only - which was unexpected. Procal 11.0, CRP 50. She was started on unasyn. Blood cultures and surgical cultures from the cyst remained negative for 48 hours. Patient also remained afebrile since admission. Patient to continue amoxicillin-clavulanate for 4 more days. Daily dressing to be done. HHC to be arranged. Patient to also follow up in wound care clinic.    Type 1 DM  consulted endocrinology for management inpatient. Patient to resume home regimen at discharge.      Essential HTN  continue lisinopril      Discharge Exam  /76   Pulse (!) 102   Temp 98 °F (36.7 °C) (Oral)   Resp 18   Ht 1.651 m (5' 5\")   Wt 70.4 kg (155 lb 4.8 oz)   LMP 2024 (Approximate)   SpO2 98%   BMI 25.84 kg/m²     Physical Exam  Constitutional:       General: She is not in acute distress.     Appearance: She is well-developed.   HENT:      Head: Normocephalic and

## 2024-11-06 ENCOUNTER — TELEPHONE (OUTPATIENT)
Dept: FAMILY MEDICINE CLINIC | Age: 29
End: 2024-11-06

## 2024-11-06 NOTE — TELEPHONE ENCOUNTER
I called over to the wound clinic they were going to squeeze the patient in at 1245 today. I called the patient and she stated she wants to wait to Friday to see them because she depends on a ride and her ride has an appointment today so that would not work out for her. She would rather keep the appointment on Friday

## 2024-11-07 NOTE — PATIENT INSTRUCTIONS
PHYSICIAN ORDERS AND DISCHARGE INSTRUCTIONS    Wound cleansing:     Do not scrub or use excessive force.   Wash hands with soap and water before and after dressing changes.   Prior to applying a clean dressing, cleanse wound with normal saline,               wound cleanser, or mild soap and water.    Ask the physician or nurse before getting the wound(s) wet in a shower          Wound Care Notes:  10/28/2024  EXCISION OF LEFT AXILLARY CYST Mook Baig II, MD         Orders for this week:  2024       Fax to [facility]!    Left Axilla : Wash with soap and water, pat dry.   Loosely pack Anasept and Stimulen dampened 1/4\" plain packing.  Cover with ABD and tape (or may use a large silicone island border reinforced with tape).  Change Monday, Wednesday, and Friday ([facility] will change on ).    Dispense 30 day quantity when ordering supplies.  Plan:        Follow Up Instructions: At the Wound Care Center in 1 week   Primary Wound Care Provider: JOAN Cool   Call  for any questions or concerns.  Central [phone] for imaging and lab work

## 2024-11-08 ENCOUNTER — HOSPITAL ENCOUNTER (OUTPATIENT)
Dept: WOUND CARE | Age: 29
Discharge: HOME | End: 2024-11-08
Attending: NURSE PRACTITIONER
Payer: COMMERCIAL

## 2024-11-08 VITALS
RESPIRATION RATE: 20 BRPM | SYSTOLIC BLOOD PRESSURE: 107 MMHG | DIASTOLIC BLOOD PRESSURE: 57 MMHG | TEMPERATURE: 97.8 F | HEART RATE: 83 BPM

## 2024-11-08 DIAGNOSIS — L02.412 ABSCESS OF LEFT AXILLA: ICD-10-CM

## 2024-11-08 DIAGNOSIS — T81.89XA NON-HEALING SURGICAL WOUND, INITIAL ENCOUNTER: Primary | ICD-10-CM

## 2024-11-08 PROCEDURE — 11042 DBRDMT SUBQ TIS 1ST 20SQCM/<: CPT

## 2024-11-08 PROCEDURE — 99213 OFFICE O/P EST LOW 20 MIN: CPT

## 2024-11-08 RX ORDER — BETAMETHASONE DIPROPIONATE 0.5 MG/G
CREAM TOPICAL ONCE
OUTPATIENT
Start: 2024-11-08 | End: 2024-11-08

## 2024-11-08 RX ORDER — BACITRACIN ZINC 500 [USP'U]/G
OINTMENT TOPICAL ONCE
OUTPATIENT
Start: 2024-11-08 | End: 2024-11-08

## 2024-11-08 RX ORDER — LIDOCAINE 50 MG/G
OINTMENT TOPICAL ONCE
OUTPATIENT
Start: 2024-11-08 | End: 2024-11-08

## 2024-11-08 RX ORDER — TRIAMCINOLONE ACETONIDE 1 MG/G
OINTMENT TOPICAL ONCE
OUTPATIENT
Start: 2024-11-08 | End: 2024-11-08

## 2024-11-08 RX ORDER — GENTAMICIN SULFATE 1 MG/G
OINTMENT TOPICAL ONCE
OUTPATIENT
Start: 2024-11-08 | End: 2024-11-08

## 2024-11-08 RX ORDER — LIDOCAINE HYDROCHLORIDE 40 MG/ML
SOLUTION TOPICAL ONCE
OUTPATIENT
Start: 2024-11-08 | End: 2024-11-08

## 2024-11-08 RX ORDER — LIDOCAINE 40 MG/G
CREAM TOPICAL ONCE
OUTPATIENT
Start: 2024-11-08 | End: 2024-11-08

## 2024-11-08 RX ORDER — LIDOCAINE HYDROCHLORIDE 20 MG/ML
JELLY TOPICAL ONCE
OUTPATIENT
Start: 2024-11-08 | End: 2024-11-08

## 2024-11-08 RX ORDER — SILVER SULFADIAZINE 10 MG/G
CREAM TOPICAL ONCE
OUTPATIENT
Start: 2024-11-08 | End: 2024-11-08

## 2024-11-08 RX ORDER — BACITRACIN ZINC AND POLYMYXIN B SULFATE 500; 1000 [USP'U]/G; [USP'U]/G
OINTMENT TOPICAL ONCE
OUTPATIENT
Start: 2024-11-08 | End: 2024-11-08

## 2024-11-08 RX ORDER — MUPIROCIN 20 MG/G
OINTMENT TOPICAL ONCE
OUTPATIENT
Start: 2024-11-08 | End: 2024-11-08

## 2024-11-08 RX ORDER — NEOMYCIN/BACITRACIN/POLYMYXINB 3.5-400-5K
OINTMENT (GRAM) TOPICAL ONCE
OUTPATIENT
Start: 2024-11-08 | End: 2024-11-08

## 2024-11-08 RX ORDER — CLOBETASOL PROPIONATE 0.5 MG/G
OINTMENT TOPICAL ONCE
OUTPATIENT
Start: 2024-11-08 | End: 2024-11-08

## 2024-11-08 RX ORDER — SODIUM CHLOR/HYPOCHLOROUS ACID 0.033 %
SOLUTION, IRRIGATION IRRIGATION ONCE
OUTPATIENT
Start: 2024-11-08 | End: 2024-11-08

## 2024-11-08 ASSESSMENT — PAIN DESCRIPTION - LOCATION: LOCATION: OTHER (COMMENT)

## 2024-11-08 ASSESSMENT — PAIN DESCRIPTION - FREQUENCY: FREQUENCY: INTERMITTENT

## 2024-11-08 ASSESSMENT — PAIN DESCRIPTION - ORIENTATION: ORIENTATION: LEFT

## 2024-11-08 ASSESSMENT — PAIN DESCRIPTION - DESCRIPTORS: DESCRIPTORS: SORE

## 2024-11-08 ASSESSMENT — PAIN DESCRIPTION - PAIN TYPE: TYPE: ACUTE PAIN

## 2024-11-08 ASSESSMENT — PAIN - FUNCTIONAL ASSESSMENT: PAIN_FUNCTIONAL_ASSESSMENT: PREVENTS OR INTERFERES SOME ACTIVE ACTIVITIES AND ADLS

## 2024-11-08 ASSESSMENT — PAIN SCALES - GENERAL: PAINLEVEL_OUTOF10: 1

## 2024-11-08 ASSESSMENT — PAIN DESCRIPTION - ONSET: ONSET: ON-GOING

## 2024-11-08 NOTE — PROGRESS NOTES
Wound Care Center Initial Visit      Estela Perales  AGE: 29 y.o.   GENDER: female  : 1995  EPISODE DATE:  2024   Referred by:post-op     Subjective:     CHIEF COMPLAINT nonhealing surgical to left axilla      HISTORY of PRESENT ILLNESS      Estela Perales is a 29 y.o. female who presents to the Wound Clinic for an initial visit for evaluation and treatment of Acute non-healing surgical  ulcer(s) of  left axilla.  The condition is of moderate severity. The ulcer has been present for 1 weeks.  The underlying cause is thought to be nonhealing surgical from cyst removal.  The patients care to date has included home care changing x3 a week. The patient has significant underlying medical conditions as below.     Wound Pain Timing/Severity: waxing and waning  Quality of pain: aching, burning  Severity of pain:  4 / 10   Modifying Factors: chronic pressure  Associated Signs/Symptoms: edema, erythema, drainage, and pain        PAST MEDICAL HISTORY        Diagnosis Date    Chronic kidney disease     Chronic kidney disease (CKD) stage G1/A1, glomerular filtration rate (GFR) equal to or greater than 90 mL/min/1.73 square meter and albuminuria creatinine ratio less than 30 mg/g 2018    Diabetes mellitus (HCC)     DKA (diabetic ketoacidoses)     DKA (diabetic ketoacidoses)     Type 2 diabetes mellitus with hyperosmolarity, uncontrolled (HCC) 2022       PAST SURGICAL HISTORY    Past Surgical History:   Procedure Laterality Date    AXILLARY SURGERY Left 10/28/2024    EXCISION OF LEFT AXILLARY CYST performed by Mook Baig II, MD at [facility] OR    EYE SURGERY         FAMILY HISTORY    Family History   Problem Relation Age of Onset    Diabetes Mother     High Blood Pressure Mother     Kidney Disease Mother     Vision Loss Mother     Diabetes Type 1  Father     Diabetes Type 1  Brother        SOCIAL HISTORY    Social History     Tobacco Use    Smoking status: Never    Smokeless tobacco: Never

## 2024-11-08 NOTE — PROGRESS NOTES
Demographics info for supply order 24.  Electronically signed by Lionel Gray RN on 2024 at 3:35 PM      Patient Info:    Estela Perales  [address] [address]  [phone]  : 1995  Age: 29 y.o.  Gender: female  Todays Date: 2024    Insurance Info:    Plan: [employer]  Coverage: [employer]  Effective Date: 2020  Group Number: [unfilled]  Subscriber Number: [id number] - (Medicaid Managed)    Payer/Plan Subscr  Sex Relation Sub. Ins. ID Effective Group Num   1. [employer] - * [facility] PER[facility]ES* 1995 Female Self [id number] [address]

## 2024-11-12 ENCOUNTER — OFFICE VISIT (OUTPATIENT)
Dept: BARIATRICS/WEIGHT MGMT | Age: 29
End: 2024-11-12

## 2024-11-12 VITALS
BODY MASS INDEX: 25.91 KG/M2 | SYSTOLIC BLOOD PRESSURE: 112 MMHG | HEART RATE: 92 BPM | HEIGHT: 65 IN | WEIGHT: 155.5 LBS | DIASTOLIC BLOOD PRESSURE: 70 MMHG | OXYGEN SATURATION: 97 %

## 2024-11-12 DIAGNOSIS — L72.3 SEBACEOUS CYST OF LEFT AXILLA: Primary | ICD-10-CM

## 2024-11-12 NOTE — PROGRESS NOTES
Paying Living Expenses: Patient unable to answer   Food Insecurity: No Food Insecurity (10/28/2024)    Hunger Vital Sign     Worried About Running Out of Food in the Last Year: Never true     Ran Out of Food in the Last Year: Never true   Transportation Needs: No Transportation Needs (10/28/2024)    PRAPARE - Transportation     Lack of Transportation (Medical): No     Lack of Transportation (Non-Medical): No   Physical Activity: Inactive (7/2/2024)    Exercise Vital Sign     Days of Exercise per Week: 0 days     Minutes of Exercise per Session: 0 min   Stress: Not on file   Social Connections: Not on file   Intimate Partner Violence: Not on file   Housing Stability: Low Risk  (10/28/2024)    Housing Stability Vital Sign     Unable to Pay for Housing in the Last Year: No     Number of Times Moved in the Last Year: 1     Homeless in the Last Year: No       OBJECTIVE:  Physical Exam  Left axillary dressing in place. Removed. Packing present.   Minimal drainage. No odor.       Pathology report reveals:     No growth to date No growth 36 to 48 hours No growth on primary media Ruling out growth in broth Further report to follow Anaerobic culture further report to follow No anaerobes isolated so far, Further report to follow       ASSESSMENT:      No diagnosis found.    PLAN:  1. Reviewed pathology report  2. Diet - as tolerated  3. Activity - increase  4. Follow up - 2 weeks  5. Daily dressing changes.   6. Call with any questions, concerns, or issues whatsoever.             No orders of the defined types were placed in this encounter.       No orders of the defined types were placed in this encounter.       Follow Up: No follow-ups on file.    Mook Baig II, MD

## 2024-11-15 ENCOUNTER — HOSPITAL ENCOUNTER (OUTPATIENT)
Dept: WOUND CARE | Age: 29
Discharge: HOME | End: 2024-11-15
Attending: NURSE PRACTITIONER
Payer: COMMERCIAL

## 2024-11-15 VITALS
SYSTOLIC BLOOD PRESSURE: 99 MMHG | TEMPERATURE: 97.9 F | DIASTOLIC BLOOD PRESSURE: 64 MMHG | RESPIRATION RATE: 16 BRPM | HEART RATE: 76 BPM

## 2024-11-15 DIAGNOSIS — L02.412 ABSCESS OF LEFT AXILLA: Primary | ICD-10-CM

## 2024-11-15 DIAGNOSIS — T81.89XA NON-HEALING SURGICAL WOUND, INITIAL ENCOUNTER: ICD-10-CM

## 2024-11-15 PROCEDURE — 99213 OFFICE O/P EST LOW 20 MIN: CPT | Performed by: NURSE PRACTITIONER

## 2024-11-15 PROCEDURE — 11042 DBRDMT SUBQ TIS 1ST 20SQCM/<: CPT

## 2024-11-15 RX ORDER — GENTAMICIN SULFATE 1 MG/G
OINTMENT TOPICAL ONCE
OUTPATIENT
Start: 2024-11-15 | End: 2024-11-15

## 2024-11-15 RX ORDER — BACITRACIN ZINC AND POLYMYXIN B SULFATE 500; 1000 [USP'U]/G; [USP'U]/G
OINTMENT TOPICAL ONCE
OUTPATIENT
Start: 2024-11-15 | End: 2024-11-15

## 2024-11-15 RX ORDER — TRIAMCINOLONE ACETONIDE 1 MG/G
OINTMENT TOPICAL ONCE
OUTPATIENT
Start: 2024-11-15 | End: 2024-11-15

## 2024-11-15 RX ORDER — LIDOCAINE HYDROCHLORIDE 20 MG/ML
JELLY TOPICAL ONCE
OUTPATIENT
Start: 2024-11-15 | End: 2024-11-15

## 2024-11-15 RX ORDER — SODIUM CHLOR/HYPOCHLOROUS ACID 0.033 %
SOLUTION, IRRIGATION IRRIGATION ONCE
OUTPATIENT
Start: 2024-11-15 | End: 2024-11-15

## 2024-11-15 RX ORDER — NEOMYCIN/BACITRACIN/POLYMYXINB 3.5-400-5K
OINTMENT (GRAM) TOPICAL ONCE
OUTPATIENT
Start: 2024-11-15 | End: 2024-11-15

## 2024-11-15 RX ORDER — LIDOCAINE 50 MG/G
OINTMENT TOPICAL ONCE
OUTPATIENT
Start: 2024-11-15 | End: 2024-11-15

## 2024-11-15 RX ORDER — MUPIROCIN 20 MG/G
OINTMENT TOPICAL ONCE
OUTPATIENT
Start: 2024-11-15 | End: 2024-11-15

## 2024-11-15 RX ORDER — LIDOCAINE HYDROCHLORIDE 40 MG/ML
SOLUTION TOPICAL ONCE
OUTPATIENT
Start: 2024-11-15 | End: 2024-11-15

## 2024-11-15 RX ORDER — CLOBETASOL PROPIONATE 0.5 MG/G
OINTMENT TOPICAL ONCE
OUTPATIENT
Start: 2024-11-15 | End: 2024-11-15

## 2024-11-15 RX ORDER — SILVER SULFADIAZINE 10 MG/G
CREAM TOPICAL ONCE
OUTPATIENT
Start: 2024-11-15 | End: 2024-11-15

## 2024-11-15 RX ORDER — LIDOCAINE 40 MG/G
CREAM TOPICAL ONCE
OUTPATIENT
Start: 2024-11-15 | End: 2024-11-15

## 2024-11-15 RX ORDER — BACITRACIN ZINC 500 [USP'U]/G
OINTMENT TOPICAL ONCE
OUTPATIENT
Start: 2024-11-15 | End: 2024-11-15

## 2024-11-15 RX ORDER — BETAMETHASONE DIPROPIONATE 0.5 MG/G
CREAM TOPICAL ONCE
OUTPATIENT
Start: 2024-11-15 | End: 2024-11-15

## 2024-11-15 ASSESSMENT — PAIN SCALES - GENERAL: PAINLEVEL_OUTOF10: 2

## 2024-11-15 ASSESSMENT — PAIN DESCRIPTION - FREQUENCY: FREQUENCY: INTERMITTENT

## 2024-11-15 ASSESSMENT — PAIN DESCRIPTION - LOCATION: LOCATION: ARM

## 2024-11-15 ASSESSMENT — PAIN DESCRIPTION - ORIENTATION: ORIENTATION: LEFT

## 2024-11-15 ASSESSMENT — PAIN DESCRIPTION - DESCRIPTORS: DESCRIPTORS: SORE

## 2024-11-15 ASSESSMENT — PAIN DESCRIPTION - PAIN TYPE: TYPE: ACUTE PAIN

## 2024-11-15 ASSESSMENT — PAIN DESCRIPTION - ONSET: ONSET: ON-GOING

## 2024-11-15 NOTE — PROGRESS NOTES
Wound Care Center Progress Note With Procedure    Estela Perales  AGE: 29 y.o.   GENDER: female  : 1995  EPISODE DATE:  11/15/2024     Subjective:     Chief Complaint   Patient presents with    Wound Check         HISTORY of PRESENT ILLNESS     Estela Perales is a 29 y.o. female who presents to the Wound Clinic for a visit for evaluation and treatment of Acute non-healing surgical  ulcer(s) of  left axilla.  The condition is of moderate severity. The ulcer has been present for 1 weeks.  The underlying cause is thought to be nonhealing surgical from cyst removal.  The patients care to date has included home care changing x3 a week. The patient has significant underlying medical conditions as below.     11-: Patient improved. Home health coming as ordered and she has her supplies     Wound Pain Timing/Severity: waxing and waning  Quality of pain: aching, burning  Severity of pain:   10   Modifying Factors: chronic pressure  Associated Signs/Symptoms: edema, erythema, drainage, and pain        PAST MEDICAL HISTORY        Diagnosis Date    Chronic kidney disease     Chronic kidney disease (CKD) stage G1/A1, glomerular filtration rate (GFR) equal to or greater than 90 mL/min/1.73 square meter and albuminuria creatinine ratio less than 30 mg/g 2018    Diabetes mellitus (HCC)     DKA (diabetic ketoacidoses)     DKA (diabetic ketoacidoses)     Type 2 diabetes mellitus with hyperosmolarity, uncontrolled (HCC) 2022       PAST SURGICAL HISTORY    Past Surgical History:   Procedure Laterality Date    AXILLARY SURGERY Left 10/28/2024    EXCISION OF LEFT AXILLARY CYST performed by Mook Baig II, MD at [facility] OR    EYE SURGERY         FAMILY HISTORY    Family History   Problem Relation Age of Onset    Diabetes Mother     High Blood Pressure Mother     Kidney Disease Mother     Vision Loss Mother     Diabetes Type 1  Father     Diabetes Type 1  Brother        SOCIAL HISTORY    Social

## 2024-11-15 NOTE — PATIENT INSTRUCTIONS
PHYSICIAN ORDERS AND DISCHARGE INSTRUCTIONS    Wound cleansing:     Do not scrub or use excessive force.   Wash hands with soap and water before and after dressing changes.   Prior to applying a clean dressing, cleanse wound with normal saline,               wound cleanser, or mild soap and water.    Ask the physician or nurse before getting the wound(s) wet in a shower          Wound Care Notes:  10/28/2024  EXCISION OF LEFT AXILLARY CYST Mook Baig II, MD         Orders for this week:  11/15/2024       Fax to [facility]!    Left Axilla : Wash with soap and water, pat dry.   Loosely pack Anasept and Stimulen dampened 1/4\" plain packing.  Cover with ABD and tape (or may use a large silicone island border reinforced with tape).  Change Monday, Wednesday, and Friday ([facility] will change on ).    Dispense 30 day quantity when ordering supplies.  Plan:        Follow Up Instructions: At the Wound Care Center in 1 week   Primary Wound Care Provider: JOAN Cool   Call  for any questions or concerns.  Central [phone] for imaging and lab work

## 2024-11-17 DIAGNOSIS — E10.22 TYPE 1 DIABETES MELLITUS WITH STAGE 1 CHRONIC KIDNEY DISEASE (HCC): ICD-10-CM

## 2024-11-17 DIAGNOSIS — N18.1 TYPE 1 DIABETES MELLITUS WITH STAGE 1 CHRONIC KIDNEY DISEASE (HCC): ICD-10-CM

## 2024-11-17 DIAGNOSIS — E10.42 TYPE 1 DIABETES MELLITUS WITH DIABETIC POLYNEUROPATHY (HCC): ICD-10-CM

## 2024-11-18 RX ORDER — INSULIN ASPART 100 [IU]/ML
INJECTION, SUSPENSION SUBCUTANEOUS
Qty: 10 ML | OUTPATIENT
Start: 2024-11-18

## 2024-11-22 ENCOUNTER — HOSPITAL ENCOUNTER (OUTPATIENT)
Dept: WOUND CARE | Age: 29
Discharge: HOME | End: 2024-11-22
Attending: NURSE PRACTITIONER
Payer: COMMERCIAL

## 2024-11-22 VITALS
HEART RATE: 89 BPM | SYSTOLIC BLOOD PRESSURE: 105 MMHG | DIASTOLIC BLOOD PRESSURE: 50 MMHG | TEMPERATURE: 97.5 F | RESPIRATION RATE: 16 BRPM

## 2024-11-22 DIAGNOSIS — L02.412 ABSCESS OF LEFT AXILLA: Primary | ICD-10-CM

## 2024-11-22 DIAGNOSIS — T81.89XD NON-HEALING SURGICAL WOUND, SUBSEQUENT ENCOUNTER: ICD-10-CM

## 2024-11-22 PROCEDURE — 11042 DBRDMT SUBQ TIS 1ST 20SQCM/<: CPT

## 2024-11-22 RX ORDER — NEOMYCIN/BACITRACIN/POLYMYXINB 3.5-400-5K
OINTMENT (GRAM) TOPICAL ONCE
OUTPATIENT
Start: 2024-11-22 | End: 2024-11-22

## 2024-11-22 RX ORDER — BACITRACIN ZINC AND POLYMYXIN B SULFATE 500; 1000 [USP'U]/G; [USP'U]/G
OINTMENT TOPICAL ONCE
OUTPATIENT
Start: 2024-11-22 | End: 2024-11-22

## 2024-11-22 RX ORDER — LIDOCAINE HYDROCHLORIDE 20 MG/ML
JELLY TOPICAL ONCE
OUTPATIENT
Start: 2024-11-22 | End: 2024-11-22

## 2024-11-22 RX ORDER — TRIAMCINOLONE ACETONIDE 1 MG/G
OINTMENT TOPICAL ONCE
OUTPATIENT
Start: 2024-11-22 | End: 2024-11-22

## 2024-11-22 RX ORDER — BACITRACIN ZINC 500 [USP'U]/G
OINTMENT TOPICAL ONCE
OUTPATIENT
Start: 2024-11-22 | End: 2024-11-22

## 2024-11-22 RX ORDER — SODIUM CHLOR/HYPOCHLOROUS ACID 0.033 %
SOLUTION, IRRIGATION IRRIGATION ONCE
OUTPATIENT
Start: 2024-11-22 | End: 2024-11-22

## 2024-11-22 RX ORDER — MUPIROCIN 20 MG/G
OINTMENT TOPICAL ONCE
OUTPATIENT
Start: 2024-11-22 | End: 2024-11-22

## 2024-11-22 RX ORDER — SILVER SULFADIAZINE 10 MG/G
CREAM TOPICAL ONCE
OUTPATIENT
Start: 2024-11-22 | End: 2024-11-22

## 2024-11-22 RX ORDER — BETAMETHASONE DIPROPIONATE 0.5 MG/G
CREAM TOPICAL ONCE
OUTPATIENT
Start: 2024-11-22 | End: 2024-11-22

## 2024-11-22 RX ORDER — CLOBETASOL PROPIONATE 0.5 MG/G
OINTMENT TOPICAL ONCE
OUTPATIENT
Start: 2024-11-22 | End: 2024-11-22

## 2024-11-22 RX ORDER — LIDOCAINE 50 MG/G
OINTMENT TOPICAL ONCE
OUTPATIENT
Start: 2024-11-22 | End: 2024-11-22

## 2024-11-22 RX ORDER — LIDOCAINE HYDROCHLORIDE 40 MG/ML
SOLUTION TOPICAL ONCE
OUTPATIENT
Start: 2024-11-22 | End: 2024-11-22

## 2024-11-22 RX ORDER — LIDOCAINE 40 MG/G
CREAM TOPICAL ONCE
OUTPATIENT
Start: 2024-11-22 | End: 2024-11-22

## 2024-11-22 RX ORDER — GENTAMICIN SULFATE 1 MG/G
OINTMENT TOPICAL ONCE
OUTPATIENT
Start: 2024-11-22 | End: 2024-11-22

## 2024-11-22 NOTE — PATIENT INSTRUCTIONS
PHYSICIAN ORDERS AND DISCHARGE INSTRUCTIONS    Wound cleansing:     Do not scrub or use excessive force.   Wash hands with soap and water before and after dressing changes.   Prior to applying a clean dressing, cleanse wound with normal saline,               wound cleanser, or mild soap and water.    Ask the physician or nurse before getting the wound(s) wet in a shower          Wound Care Notes:  10/28/2024  EXCISION OF LEFT AXILLARY CYST Mook Baig II, MD         Orders for this week:  2024       Fax to [facility]!    Left Axilla : Wash with soap and water, pat dry.   Loosely pack Anasept and Stimulen dampened 1/4\" plain packing (when depth fills in, packing is no longer necessary - just apply anasept and stimulen to wound bed).  Cover with silicone island border.  Change Monday, Wednesday, and 24 and 24     Dispense 30 day quantity when ordering supplies.  Plan:        Follow Up Instructions: At the Wound Care Center in 3 weeks  Primary Wound Care Provider: Dr Espana   Call  for any questions or concerns.  Central [phone] for imaging and lab work

## 2024-11-22 NOTE — PROGRESS NOTES
Wound Care Center Progress Note With Procedure    Estela Perales  AGE: 29 y.o.   GENDER: female  : 1995  EPISODE DATE:  2024     Subjective:     Chief Complaint   Patient presents with    Wound Check     Left armpit         HISTORY of PRESENT ILLNESS     Estela Perales is a 29 y.o. female who presents to the Wound Clinic for a visit for evaluation and treatment of Acute non-healing surgical  ulcer(s) of  left axilla.  The condition is of moderate severity. The ulcer has been present for 1 weeks.  The underlying cause is thought to be nonhealing surgical from cyst removal.  The patients care to date has included home care changing x3 a week. The patient has significant underlying medical conditions as below.     2024: Patient improved and depth nearly resolved. Patient has home care and we will push out 3 weeks     11-: Patient improved. Home health coming as ordered and she has her supplies     Wound Pain Timing/Severity: waxing and waning  Quality of pain: aching, burning  Severity of pain:   10   Modifying Factors: chronic pressure  Associated Signs/Symptoms: edema, erythema, drainage, and pain        PAST MEDICAL HISTORY        Diagnosis Date    Chronic kidney disease     Chronic kidney disease (CKD) stage G1/A1, glomerular filtration rate (GFR) equal to or greater than 90 mL/min/1.73 square meter and albuminuria creatinine ratio less than 30 mg/g 2018    Diabetes mellitus (HCC)     DKA (diabetic ketoacidoses)     DKA (diabetic ketoacidoses)     Type 2 diabetes mellitus with hyperosmolarity, uncontrolled (HCC) 2022       PAST SURGICAL HISTORY    Past Surgical History:   Procedure Laterality Date    AXILLARY SURGERY Left 10/28/2024    EXCISION OF LEFT AXILLARY CYST performed by Mook Baig II, MD at [facility] OR    EYE SURGERY         FAMILY HISTORY    Family History   Problem Relation Age of Onset    Diabetes Mother     High Blood Pressure Mother     Kidney

## 2024-11-23 DIAGNOSIS — E10.22 TYPE 1 DIABETES MELLITUS WITH STAGE 1 CHRONIC KIDNEY DISEASE (HCC): ICD-10-CM

## 2024-11-23 DIAGNOSIS — E10.42 TYPE 1 DIABETES MELLITUS WITH DIABETIC POLYNEUROPATHY (HCC): ICD-10-CM

## 2024-11-23 DIAGNOSIS — N18.1 TYPE 1 DIABETES MELLITUS WITH STAGE 1 CHRONIC KIDNEY DISEASE (HCC): ICD-10-CM

## 2024-11-25 DIAGNOSIS — N18.1 TYPE 1 DIABETES MELLITUS WITH STAGE 1 CHRONIC KIDNEY DISEASE (HCC): ICD-10-CM

## 2024-11-25 DIAGNOSIS — E10.22 TYPE 1 DIABETES MELLITUS WITH STAGE 1 CHRONIC KIDNEY DISEASE (HCC): ICD-10-CM

## 2024-11-25 DIAGNOSIS — E10.42 TYPE 1 DIABETES MELLITUS WITH DIABETIC POLYNEUROPATHY (HCC): ICD-10-CM

## 2024-11-25 RX ORDER — INSULIN ASPART 100 [IU]/ML
INJECTION, SUSPENSION SUBCUTANEOUS
Qty: 10 ML | OUTPATIENT
Start: 2024-11-25

## 2024-11-25 NOTE — TELEPHONE ENCOUNTER
Patient called in and was wanting to switch back to the vial of insulin she stated that the pens are not helping her that her sugars are all over. I explained that she would be on the Omnipod next Tuesday and that we need to see BG readings from her. I could her grandmother in the background asking questions but when I asked if I could speak with her to explain this Patient told me that grandmother was to busy and asked me to call her tomorrow. She is asking for pen needles to be called in to the pharmacy.

## 2024-12-01 DIAGNOSIS — E10.22 TYPE 1 DIABETES MELLITUS WITH STAGE 1 CHRONIC KIDNEY DISEASE (HCC): Primary | ICD-10-CM

## 2024-12-01 DIAGNOSIS — N18.1 TYPE 1 DIABETES MELLITUS WITH STAGE 1 CHRONIC KIDNEY DISEASE (HCC): Primary | ICD-10-CM

## 2024-12-01 RX ORDER — INSULIN ASPART 100 [IU]/ML
INJECTION, SOLUTION INTRAVENOUS; SUBCUTANEOUS
Qty: 30 ML | Refills: 2 | Status: SHIPPED | OUTPATIENT
Start: 2024-12-01

## 2024-12-03 ENCOUNTER — HOSPITAL ENCOUNTER (EMERGENCY)
Age: 29
Discharge: HOME | End: 2024-12-03
Attending: STUDENT IN AN ORGANIZED HEALTH CARE EDUCATION/TRAINING PROGRAM
Payer: COMMERCIAL

## 2024-12-03 VITALS
RESPIRATION RATE: 16 BRPM | SYSTOLIC BLOOD PRESSURE: 115 MMHG | WEIGHT: 155 LBS | DIASTOLIC BLOOD PRESSURE: 60 MMHG | BODY MASS INDEX: 25.79 KG/M2 | HEART RATE: 98 BPM | TEMPERATURE: 98.2 F | OXYGEN SATURATION: 100 %

## 2024-12-03 DIAGNOSIS — E16.2 HYPOGLYCEMIA: Primary | ICD-10-CM

## 2024-12-03 LAB
ANION GAP SERPL CALCULATED.3IONS-SCNC: 11 MMOL/L (ref 9–17)
BASOPHILS # BLD: 0.08 K/UL
BASOPHILS NFR BLD: 1 % (ref 0–1)
BUN SERPL-MCNC: 14 MG/DL (ref 7–20)
CALCIUM SERPL-MCNC: 9.6 MG/DL (ref 8.3–10.6)
CHLORIDE SERPL-SCNC: 107 MMOL/L (ref 99–110)
CHP ED QC CHECK: YES
CO2 SERPL-SCNC: 24 MMOL/L (ref 21–32)
CREAT SERPL-MCNC: 0.7 MG/DL (ref 0.6–1.1)
EOSINOPHIL # BLD: 0.27 K/UL
EOSINOPHILS RELATIVE PERCENT: 2 % (ref 0–3)
ERYTHROCYTE [DISTWIDTH] IN BLOOD BY AUTOMATED COUNT: 12.3 % (ref 11.7–14.9)
GFR, ESTIMATED: >90 ML/MIN/1.73M2
GLUCOSE BLD-MCNC: 45 MG/DL (ref 74–99)
GLUCOSE BLD-MCNC: 60 MG/DL (ref 74–99)
GLUCOSE BLD-MCNC: 94 MG/DL
GLUCOSE BLD-MCNC: 94 MG/DL (ref 74–99)
GLUCOSE SERPL-MCNC: 30 MG/DL (ref 74–99)
HCT VFR BLD AUTO: 38.8 % (ref 37–47)
HGB BLD-MCNC: 12.2 G/DL (ref 12.5–16)
IMM GRANULOCYTES # BLD AUTO: 0.05 K/UL
IMM GRANULOCYTES NFR BLD: 0 %
LYMPHOCYTES NFR BLD: 3.95 K/UL
LYMPHOCYTES RELATIVE PERCENT: 26 % (ref 24–44)
MCH RBC QN AUTO: 29.1 PG (ref 27–31)
MCHC RBC AUTO-ENTMCNC: 31.4 G/DL (ref 32–36)
MCV RBC AUTO: 92.6 FL (ref 78–100)
MONOCYTES NFR BLD: 1.03 K/UL
MONOCYTES NFR BLD: 7 % (ref 0–4)
NEUTROPHILS NFR BLD: 64 % (ref 36–66)
NEUTS SEG NFR BLD: 9.68 K/UL
PLATELET # BLD AUTO: 399 K/UL (ref 140–440)
PMV BLD AUTO: 9.5 FL (ref 7.5–11.1)
POTASSIUM SERPL-SCNC: 3.7 MMOL/L (ref 3.5–5.1)
RBC # BLD AUTO: 4.19 M/UL (ref 4.2–5.4)
SODIUM SERPL-SCNC: 142 MMOL/L (ref 136–145)
WBC OTHER # BLD: 15.1 K/UL (ref 4–10.5)

## 2024-12-03 PROCEDURE — 99283 EMERGENCY DEPT VISIT LOW MDM: CPT

## 2024-12-03 PROCEDURE — 82962 GLUCOSE BLOOD TEST: CPT

## 2024-12-03 PROCEDURE — 80048 BASIC METABOLIC PNL TOTAL CA: CPT

## 2024-12-03 PROCEDURE — 85025 COMPLETE CBC W/AUTO DIFF WBC: CPT

## 2024-12-03 PROCEDURE — 36415 COLL VENOUS BLD VENIPUNCTURE: CPT

## 2024-12-03 ASSESSMENT — PAIN SCALES - GENERAL: PAINLEVEL_OUTOF10: 0

## 2024-12-03 ASSESSMENT — PAIN - FUNCTIONAL ASSESSMENT: PAIN_FUNCTIONAL_ASSESSMENT: 0-10

## 2024-12-03 NOTE — ED NOTES
Pt provided apple juice, box lunch and pudding. Pt educated on importance of consuming sugar as a less invasive intervention for pt to need IV glucose if unable to raise BG w/ PO intake. Pt continues to use phone and ignore the encouragement provided by this nurse. Education provided w/ redirection successful on third attempt.

## 2024-12-03 NOTE — ED NOTES
Pt arrives to ED w/ request to assist w/ using newly inserted insulin pump. Pt was provided insulin pump but has recently had eye surgery as well and believes they have accidentally hit a button and has felt \"off\". Pt's BG checked and is at 45. Informed charge nurse, placed pt in room, notified Dr. Puckett. Instructed per  to provide pt w/ juice to bring BG up. Dr. Puckett came to bedside and paused insulin pump

## 2024-12-04 ENCOUNTER — TELEPHONE (OUTPATIENT)
Dept: FAMILY MEDICINE CLINIC | Age: 29
End: 2024-12-04

## 2024-12-04 NOTE — ED PROVIDER NOTES
Emergency Department Encounter    Patient: Estela Perales  MRN: [id number]  : 1995  Date of Evaluation: 12/3/2024  ED Provider:  Niranjan Puckett DO    Triage Chief Complaint:   Hypoglycemia and Insulin Reaction (Insulin pump issues)    [place]:  Estela Perales is a 29 y.o. female that presents with hypoglycemia at home due to inability to use her insulin pump.  Reports that she just got an insulin pump for the first time today.  She does have vision problems so her primary care had explained to her grandmother how to use the insulin pump.  However when they came home they were not sure how to import her carbs to get appropriate insulin doses and that she was using her Dexcom and noticed her glucose going down into the 40s.  She remained asymptomatic denies any chest pain shortness of breath lightheadedness or syncope.  She has had no other recent illnesses.  Prior to getting insulin pump was using vials and subcu injections    MDM:    History from : Patient    Patient overall very well-appearing normal vital signs.  On her Dexcom reading I did see that her glucose was at 57.  We initially given her juice and food.  I paused her insulin pump for 2 hours.  She did have a BMP with a glucose result of 30 but the labs were drawn prior to giving her enough time after insulin shut off.  She was monitored for about 2 and half hours in the emergency department.  She tolerated p.o. intake no other significant hematologic or metabolic abnormalities that are not near her baseline.  Leukocytosis has been present previously and actually improved today.  On final evaluation her glucose was up to 94.  She remains asymptomatic.  We have removed the insulin pump and I recommended she calling her primary care in the morning to see if she can get in for repeat education otherwise recommended going into walk-in clinic.  Return precautions discussed as well.           Patient was given the following

## 2024-12-04 NOTE — TELEPHONE ENCOUNTER
Keiko smith RN from [facility] called stating she wanted a verbal for a  to come out to the patients home. Keiko stated that they are seeing the patient for wound care 2 or 3 times a week to help take care of her wound. When trying to educate the patient there is never an adult or anyone there to help the patient when its time to change the wound dressing, Keiko stated that she was just in the hospital yesterday for Hypoglycemia she might have taking too much insulin. Keiko is wanting a  to come out to the home. Please advise

## 2024-12-05 ENCOUNTER — OFFICE VISIT (OUTPATIENT)
Dept: BARIATRICS/WEIGHT MGMT | Age: 29
End: 2024-12-05
Payer: COMMERCIAL

## 2024-12-05 VITALS
DIASTOLIC BLOOD PRESSURE: 70 MMHG | HEART RATE: 74 BPM | HEIGHT: 65 IN | OXYGEN SATURATION: 99 % | WEIGHT: 155 LBS | SYSTOLIC BLOOD PRESSURE: 118 MMHG | BODY MASS INDEX: 25.83 KG/M2

## 2024-12-05 DIAGNOSIS — S41.102S: Primary | ICD-10-CM

## 2024-12-05 PROCEDURE — 99212 OFFICE O/P EST SF 10 MIN: CPT | Performed by: SURGERY

## 2024-12-05 PROCEDURE — G8484 FLU IMMUNIZE NO ADMIN: HCPCS | Performed by: SURGERY

## 2024-12-05 PROCEDURE — G8427 DOCREV CUR MEDS BY ELIG CLIN: HCPCS | Performed by: SURGERY

## 2024-12-05 PROCEDURE — 1036F TOBACCO NON-USER: CPT | Performed by: SURGERY

## 2024-12-05 PROCEDURE — G8419 CALC BMI OUT NRM PARAM NOF/U: HCPCS | Performed by: SURGERY

## 2024-12-05 NOTE — PROGRESS NOTES
Post-Operative Clinic Note    Chief Complaint   Patient presents with    Follow-up     2nd f/u excision of soft tissue mass 10/2024          SUBJECTIVE:  Patient is here today for a post-operative visit.     Patient is s/p excision of left axillary cyst.    Doing well.    Has been seen at Wound Center.    Some soreness.          Past Surgical History:   Procedure Laterality Date    AXILLARY SURGERY Left 10/28/2024    EXCISION OF LEFT AXILLARY CYST performed by Mook Baig II, MD at [facility] OR    EYE SURGERY       Past Medical History:   Diagnosis Date    Chronic kidney disease     Chronic kidney disease (CKD) stage G1/A1, glomerular filtration rate (GFR) equal to or greater than 90 mL/min/1.73 square meter and albuminuria creatinine ratio less than 30 mg/g 1/22/2018    Diabetes mellitus (HCC)     DKA (diabetic ketoacidoses)     DKA (diabetic ketoacidoses)     Type 2 diabetes mellitus with hyperosmolarity, uncontrolled (HCC) 12/8/2022     Family History   Problem Relation Age of Onset    Diabetes Mother     High Blood Pressure Mother     Kidney Disease Mother     Vision Loss Mother     Diabetes Type 1  Father     Diabetes Type 1  Brother      Social History     Socioeconomic History    Marital status: Single     Spouse name: Not on file    Number of children: Not on file    Years of education: Not on file    Highest education level: Not on file   Occupational History    Not on file   Tobacco Use    Smoking status: Never    Smokeless tobacco: Never   Vaping Use    Vaping status: Never Used   Substance and Sexual Activity    Alcohol use: No    Drug use: No    Sexual activity: Not Currently   Other Topics Concern    Not on file   Social History Narrative    Not on file     Social Determinants of Health     Financial Resource Strain: Patient Unable To Answer (8/8/2024)    Overall Financial Resource Strain (CARDIA)     Difficulty of Paying Living Expenses: Patient unable to answer   Food Insecurity: No Food Insecurity

## 2024-12-12 ENCOUNTER — TELEPHONE (OUTPATIENT)
Dept: FAMILY MEDICINE CLINIC | Age: 29
End: 2024-12-12

## 2024-12-12 NOTE — TELEPHONE ENCOUNTER
I have spoke with this patient via telephone today & have advised her that refills of her 70/30 insulin was sent to the pharmacy in November with 2 refills.  Patient states she does not want to continue with using the Omnipod due to the hypoglycemic event that required an ER visit.  Upon trying to reschedule her missed appt from today, she is unsure if they will be able to make it to the appointment on 12/19/2024.  Patient has been advised that I do not believe that continuing on her current treatment of 70/30 insulin is safe, I also do not believe that the patient is able to safely administer insulin or monitor her own glucose levels independently due to her visual deficits and cognitive functioning.  Patient has been advised that I will not continue this current treatment plan & if we can not come to an agreement on a treatment plan that is safe and effective, she will have to transfer her diabetic care to another provider.  Patient verbalized understanding.

## 2024-12-12 NOTE — TELEPHONE ENCOUNTER
Refills of her 70/30 insulin was previously sent in November with 2 refills so if she is not using the OmniPod, then she should return to her previous insulin dosing instructions.  She will need an OV to discuss future management of her diabetes.  She has had a long standing history of hypoglycemia & hyperglycemia which is why the OmniPod was suggested & agreed upon by patient & grandmother.

## 2024-12-12 NOTE — TELEPHONE ENCOUNTER
Patient called in to cancel her appointment. I asked patient if she needed to reschedule she stated yes. I asked patient if she was wearing her Omnipod she proceeded to tell me no that she had an episode that she had to go to the hospital and that they removed the pod. She is requesting to just go back to her normal medication she wants nothing to do with the Omnipod. I explained to patient that I would have to speak to provider and call her back.

## 2024-12-13 ENCOUNTER — HOSPITAL ENCOUNTER (OUTPATIENT)
Dept: WOUND CARE | Age: 29
Discharge: HOME | End: 2024-12-13
Attending: NURSE PRACTITIONER
Payer: COMMERCIAL

## 2024-12-13 VITALS
TEMPERATURE: 96.9 F | DIASTOLIC BLOOD PRESSURE: 61 MMHG | SYSTOLIC BLOOD PRESSURE: 108 MMHG | HEART RATE: 97 BPM | RESPIRATION RATE: 16 BRPM

## 2024-12-13 DIAGNOSIS — L02.412 ABSCESS OF LEFT AXILLA: Primary | ICD-10-CM

## 2024-12-13 DIAGNOSIS — T81.89XA NON-HEALING SURGICAL WOUND, INITIAL ENCOUNTER: ICD-10-CM

## 2024-12-13 PROCEDURE — 11042 DBRDMT SUBQ TIS 1ST 20SQCM/<: CPT

## 2024-12-13 RX ORDER — TRIAMCINOLONE ACETONIDE 1 MG/G
OINTMENT TOPICAL ONCE
OUTPATIENT
Start: 2024-12-13 | End: 2024-12-13

## 2024-12-13 RX ORDER — BETAMETHASONE DIPROPIONATE 0.5 MG/G
CREAM TOPICAL ONCE
OUTPATIENT
Start: 2024-12-13 | End: 2024-12-13

## 2024-12-13 RX ORDER — LIDOCAINE 40 MG/G
CREAM TOPICAL ONCE
OUTPATIENT
Start: 2024-12-13 | End: 2024-12-13

## 2024-12-13 RX ORDER — SILVER SULFADIAZINE 10 MG/G
CREAM TOPICAL ONCE
OUTPATIENT
Start: 2024-12-13 | End: 2024-12-13

## 2024-12-13 RX ORDER — LIDOCAINE HYDROCHLORIDE 20 MG/ML
JELLY TOPICAL ONCE
OUTPATIENT
Start: 2024-12-13 | End: 2024-12-13

## 2024-12-13 RX ORDER — LIDOCAINE 50 MG/G
OINTMENT TOPICAL ONCE
OUTPATIENT
Start: 2024-12-13 | End: 2024-12-13

## 2024-12-13 RX ORDER — BACITRACIN ZINC 500 [USP'U]/G
OINTMENT TOPICAL ONCE
OUTPATIENT
Start: 2024-12-13 | End: 2024-12-13

## 2024-12-13 RX ORDER — MUPIROCIN 20 MG/G
OINTMENT TOPICAL ONCE
OUTPATIENT
Start: 2024-12-13 | End: 2024-12-13

## 2024-12-13 RX ORDER — SODIUM CHLOR/HYPOCHLOROUS ACID 0.033 %
SOLUTION, IRRIGATION IRRIGATION ONCE
OUTPATIENT
Start: 2024-12-13 | End: 2024-12-13

## 2024-12-13 RX ORDER — NEOMYCIN/BACITRACIN/POLYMYXINB 3.5-400-5K
OINTMENT (GRAM) TOPICAL ONCE
OUTPATIENT
Start: 2024-12-13 | End: 2024-12-13

## 2024-12-13 RX ORDER — BACITRACIN ZINC AND POLYMYXIN B SULFATE 500; 1000 [USP'U]/G; [USP'U]/G
OINTMENT TOPICAL ONCE
OUTPATIENT
Start: 2024-12-13 | End: 2024-12-13

## 2024-12-13 RX ORDER — LIDOCAINE HYDROCHLORIDE 40 MG/ML
SOLUTION TOPICAL ONCE
OUTPATIENT
Start: 2024-12-13 | End: 2024-12-13

## 2024-12-13 RX ORDER — CLOBETASOL PROPIONATE 0.5 MG/G
OINTMENT TOPICAL ONCE
OUTPATIENT
Start: 2024-12-13 | End: 2024-12-13

## 2024-12-13 RX ORDER — GENTAMICIN SULFATE 1 MG/G
OINTMENT TOPICAL ONCE
OUTPATIENT
Start: 2024-12-13 | End: 2024-12-13

## 2024-12-13 ASSESSMENT — PAIN DESCRIPTION - LOCATION: LOCATION: ARM

## 2024-12-13 ASSESSMENT — PAIN DESCRIPTION - ONSET: ONSET: ON-GOING

## 2024-12-13 ASSESSMENT — PAIN DESCRIPTION - ORIENTATION: ORIENTATION: LEFT

## 2024-12-13 ASSESSMENT — PAIN DESCRIPTION - DESCRIPTORS: DESCRIPTORS: SORE

## 2024-12-13 ASSESSMENT — PAIN DESCRIPTION - FREQUENCY: FREQUENCY: INTERMITTENT

## 2024-12-13 ASSESSMENT — PAIN SCALES - GENERAL: PAINLEVEL_OUTOF10: 1

## 2024-12-13 NOTE — H&P
arterial and venous disease if applicable and measures to manage edema.      Nutritional status: Most recent albumin 3.2. Discussed need for increased protein and calories for wound healing and good sources of protein (just over 7 grams for every 20 pounds of body weight). Animal-based foods high in protein (meat, poultry, fish, eggs, and dairy foods). Plant based foods high in protein (tofu, lentils, beans, chickpeas, nuts, quinoa and yosvany seeds.      Skin Care  Keep skin clean and well moisturized , moisturize routinely with ointments for heavier moisturizer needs for extremely dry skin or cracks such as A&D ointment and lotions for a light moisturizer such as CeraVe or Eucerin. If incontinent change incontinence garments as soon as soiled and keeping skin clean and use barrier cream to protect the skin.                 PAST MEDICAL HISTORY        Diagnosis Date    Chronic kidney disease     Chronic kidney disease (CKD) stage G1/A1, glomerular filtration rate (GFR) equal to or greater than 90 mL/min/1.73 square meter and albuminuria creatinine ratio less than 30 mg/g 1/22/2018    Diabetes mellitus (HCC)     DKA (diabetic ketoacidoses)     DKA (diabetic ketoacidoses)     Type 2 diabetes mellitus with hyperosmolarity, uncontrolled (HCC) 12/8/2022       PAST SURGICAL HISTORY    Past Surgical History:   Procedure Laterality Date    AXILLARY SURGERY Left 10/28/2024    EXCISION OF LEFT AXILLARY CYST performed by Mook Baig II, MD at [facility] OR    EYE SURGERY         FAMILY HISTORY    Family History   Problem Relation Age of Onset    Diabetes Mother     High Blood Pressure Mother     Kidney Disease Mother     Vision Loss Mother     Diabetes Type 1  Father     Diabetes Type 1  Brother        SOCIAL HISTORY    Social History     Tobacco Use    Smoking status: Never    Smokeless tobacco: Never   Vaping Use    Vaping status: Never Used   Substance Use Topics    Alcohol use: No    Drug use: No       ALLERGIES    No Known

## 2024-12-13 NOTE — PATIENT INSTRUCTIONS
PHYSICIAN ORDERS AND DISCHARGE INSTRUCTIONS    Wound cleansing:     Do not scrub or use excessive force.   Wash hands with soap and water before and after dressing changes.   Prior to applying a clean dressing, cleanse wound with normal saline,               wound cleanser, or mild soap and water.    Ask the physician or nurse before getting the wound(s) wet in a shower          Wound Care Notes:  10/28/2024  EXCISION OF LEFT AXILLARY CYST Mook Baig II, MD         Orders for this week:  2024       Fax to [facility]!    Left Axilla : Wash with soap and water, pat dry.   Loosely pack Anasept and Stimulen dampened 1/4\" plain packing (when depth fills in, packing is no longer necessary - just apply anasept and stimulen to wound bed).  Cover with silicone island border.  Change Monday, Wednesday,     Dispense 30 day quantity when ordering supplies.  Plan:        Follow Up Instructions: 1 week   Primary Wound Care Provider: Dr Espana   Call  for any questions or concerns.  Central [phone] for imaging and lab work

## 2024-12-19 ENCOUNTER — OFFICE VISIT (OUTPATIENT)
Dept: FAMILY MEDICINE CLINIC | Age: 29
End: 2024-12-19

## 2024-12-19 VITALS
BODY MASS INDEX: 25.16 KG/M2 | OXYGEN SATURATION: 99 % | HEIGHT: 65 IN | SYSTOLIC BLOOD PRESSURE: 118 MMHG | DIASTOLIC BLOOD PRESSURE: 76 MMHG | WEIGHT: 151 LBS | HEART RATE: 94 BPM

## 2024-12-19 DIAGNOSIS — E10.22 TYPE 1 DIABETES MELLITUS WITH STAGE 1 CHRONIC KIDNEY DISEASE (HCC): Primary | ICD-10-CM

## 2024-12-19 DIAGNOSIS — E10.3521: ICD-10-CM

## 2024-12-19 DIAGNOSIS — N18.1 TYPE 1 DIABETES MELLITUS WITH STAGE 1 CHRONIC KIDNEY DISEASE (HCC): Primary | ICD-10-CM

## 2024-12-19 DIAGNOSIS — E10.3522: ICD-10-CM

## 2024-12-19 DIAGNOSIS — F79 INTELLECTUAL DISABILITY: ICD-10-CM

## 2024-12-19 DIAGNOSIS — Z97.8 USES SELF-APPLIED CONTINUOUS GLUCOSE MONITORING DEVICE: ICD-10-CM

## 2024-12-19 RX ORDER — KETOROLAC TROMETHAMINE 5 MG/ML
SOLUTION OPHTHALMIC
COMMUNITY
Start: 2024-12-10

## 2024-12-19 RX ORDER — INSULIN GLARGINE 300 U/ML
20 INJECTION, SOLUTION SUBCUTANEOUS DAILY
Qty: 2 ADJUSTABLE DOSE PRE-FILLED PEN SYRINGE | Refills: 2 | Status: SHIPPED | OUTPATIENT
Start: 2024-12-19

## 2024-12-19 RX ORDER — INSULIN ASPART 100 [IU]/ML
INJECTION, SOLUTION INTRAVENOUS; SUBCUTANEOUS
Qty: 5 ADJUSTABLE DOSE PRE-FILLED PEN SYRINGE | Refills: 2 | Status: SHIPPED | OUTPATIENT
Start: 2024-12-19

## 2024-12-19 RX ORDER — MOXIFLOXACIN 5 MG/ML
SOLUTION/ DROPS OPHTHALMIC
COMMUNITY
Start: 2024-12-10

## 2024-12-19 NOTE — PROGRESS NOTES
sensors. She administered 18 units of insulin this morning, which did not appear to significantly impact her blood glucose levels. She reports that her blood glucose monitor is malfunctioning. She has been using the sensors.    She has been informed by her ophthalmologist that her vision will improve post-cataract surgery, scheduled for 02/27/2025. The delay in her surgery was due to her previous doctor leaving the practice, necessitating a referral to a new ophthalmologist, Dr. Tang, who has assured her of improved vision following cataract removal.    MEDICATIONS  Current: insulin, NovoLog  Past: Lantus    Rueda Antihyperglycemic Medications               insulin glargine, 1 unit dial, (TOUJEO SOLOSTAR) 300 UNIT/ML concentrated injection pen (Taking) Inject 20 Units into the skin daily    insulin aspart (NOVOLOG FLEXPEN) 100 UNIT/ML injection pen (Taking) Inject into the skin 3 times daily approximately 15 minutes prior to meals based on the sliding scale: if glucose is less than 130=0 units, 131-150=2 units, 151-200=4 units, 201-250=6 units, 251-300=8 units, 301-350=10 units, 351-400=12 units, 401 & higher=14 units.  Add additional 1-5 units for carb coverage.  Max daily dose: 60 units.    glucose 4 g chewable tablet (Taking) Take 4 tablets by mouth as needed for Low blood sugar          Physical Exam  The patient is alert and oriented. She has demonstrated visual deficit.  Lungs are normal with no cough, congestion, or wheeze noted. No shortness of breath.  Mood and cognition appear to be at baseline. She requires frequent redirectioning and reminding of previous instructions.    Vital Signs  Blood pressure and heart rate are within normal limits.    Results  Laboratory Studies  Blood sugar is currently 353 per her CGM.    Hemoglobin A1C   Date Value Ref Range Status   10/29/2024 8.4 (H) 4.2 - 6.3 % Final       Lab Results   Component Value Date     12/03/2024    K 3.7 12/03/2024     12/03/2024

## 2024-12-20 ENCOUNTER — HOSPITAL ENCOUNTER (OUTPATIENT)
Dept: WOUND CARE | Age: 29
Discharge: HOME | End: 2024-12-20
Attending: NURSE PRACTITIONER
Payer: COMMERCIAL

## 2024-12-20 VITALS
DIASTOLIC BLOOD PRESSURE: 77 MMHG | TEMPERATURE: 96.4 F | RESPIRATION RATE: 16 BRPM | SYSTOLIC BLOOD PRESSURE: 126 MMHG | HEART RATE: 91 BPM

## 2024-12-20 DIAGNOSIS — T81.89XA NON-HEALING SURGICAL WOUND, INITIAL ENCOUNTER: ICD-10-CM

## 2024-12-20 DIAGNOSIS — L02.412 ABSCESS OF LEFT AXILLA: Primary | ICD-10-CM

## 2024-12-20 PROCEDURE — 11042 DBRDMT SUBQ TIS 1ST 20SQCM/<: CPT | Performed by: SURGERY

## 2024-12-20 PROCEDURE — 11042 DBRDMT SUBQ TIS 1ST 20SQCM/<: CPT

## 2024-12-20 RX ORDER — SODIUM CHLOR/HYPOCHLOROUS ACID 0.033 %
SOLUTION, IRRIGATION IRRIGATION ONCE
OUTPATIENT
Start: 2024-12-20 | End: 2024-12-20

## 2024-12-20 RX ORDER — SILVER SULFADIAZINE 10 MG/G
CREAM TOPICAL ONCE
OUTPATIENT
Start: 2024-12-20 | End: 2024-12-20

## 2024-12-20 RX ORDER — LIDOCAINE HYDROCHLORIDE 20 MG/ML
JELLY TOPICAL ONCE
OUTPATIENT
Start: 2024-12-20 | End: 2024-12-20

## 2024-12-20 RX ORDER — TRIAMCINOLONE ACETONIDE 1 MG/G
OINTMENT TOPICAL ONCE
OUTPATIENT
Start: 2024-12-20 | End: 2024-12-20

## 2024-12-20 RX ORDER — BETAMETHASONE DIPROPIONATE 0.5 MG/G
CREAM TOPICAL ONCE
OUTPATIENT
Start: 2024-12-20 | End: 2024-12-20

## 2024-12-20 RX ORDER — GINSENG 100 MG
CAPSULE ORAL ONCE
OUTPATIENT
Start: 2024-12-20 | End: 2024-12-20

## 2024-12-20 RX ORDER — BACITRACIN ZINC AND POLYMYXIN B SULFATE 500; 1000 [USP'U]/G; [USP'U]/G
OINTMENT TOPICAL ONCE
OUTPATIENT
Start: 2024-12-20 | End: 2024-12-20

## 2024-12-20 RX ORDER — LIDOCAINE 40 MG/G
CREAM TOPICAL ONCE
OUTPATIENT
Start: 2024-12-20 | End: 2024-12-20

## 2024-12-20 RX ORDER — MUPIROCIN 20 MG/G
OINTMENT TOPICAL ONCE
OUTPATIENT
Start: 2024-12-20 | End: 2024-12-20

## 2024-12-20 RX ORDER — GENTAMICIN SULFATE 1 MG/G
OINTMENT TOPICAL ONCE
OUTPATIENT
Start: 2024-12-20 | End: 2024-12-20

## 2024-12-20 RX ORDER — LIDOCAINE 50 MG/G
OINTMENT TOPICAL ONCE
OUTPATIENT
Start: 2024-12-20 | End: 2024-12-20

## 2024-12-20 RX ORDER — CLOBETASOL PROPIONATE 0.5 MG/G
OINTMENT TOPICAL ONCE
OUTPATIENT
Start: 2024-12-20 | End: 2024-12-20

## 2024-12-20 RX ORDER — LIDOCAINE HYDROCHLORIDE 40 MG/ML
SOLUTION TOPICAL ONCE
OUTPATIENT
Start: 2024-12-20 | End: 2024-12-20

## 2024-12-20 RX ORDER — NEOMYCIN/BACITRACIN/POLYMYXINB 3.5-400-5K
OINTMENT (GRAM) TOPICAL ONCE
OUTPATIENT
Start: 2024-12-20 | End: 2024-12-20

## 2024-12-20 ASSESSMENT — PAIN SCALES - GENERAL: PAINLEVEL_OUTOF10: 0

## 2024-12-20 NOTE — PATIENT INSTRUCTIONS
PHYSICIAN ORDERS AND DISCHARGE INSTRUCTIONS    Wound cleansing:     Do not scrub or use excessive force.   Wash hands with soap and water before and after dressing changes.   Prior to applying a clean dressing, cleanse wound with normal saline,               wound cleanser, or mild soap and water.    Ask the physician or nurse before getting the wound(s) wet in a shower          Wound Care Notes:  10/28/2024  EXCISION OF LEFT AXILLARY CYST Mook Baig II, MD         Orders for this week:  2024       Fax to [facility]!    Left Axilla : Wash with soap and water, pat dry.   Apply liquid silver nitrate damp gauze to wound (leave in place for 3 days then apply anasept gel and stimulen powder at home)   Cover with silicone island border.  Change Monday, Wednesday, and 24      Dispense 30 day quantity when ordering supplies.  Plan:        Follow Up Instructions: 2 week   Primary Wound Care Provider: Dr Espana   Call  for any questions or concerns.  Central [phone] for imaging and lab work

## 2024-12-20 NOTE — H&P
periwound reveals the skin to be normal in turgor and texture  Wound exam: see wound description below in procedure note      Assessment:       Estela Perales  appears to have a non-healing wound of the status post excision of left axillary cyst.  There are multiple complicating factors including diabetes and shear force.  A comprehensive wound management program would be helpful to heal this wound. Assessments completed include fall risk and nutritional, functional,and psychological status.  At this time appropriate care would include: periodic debridement and wound care as below.     Problem List Items Addressed This Visit          Other    WD-Nonhealing surgical wound    Relevant Orders    Initiate Outpatient Wound Care Protocol    WD-Abscess of left axilla - Primary    Relevant Orders    Initiate Outpatient Wound Care Protocol       Procedure Note    Indications:  Based on my examination of this patient's wound(s) today, sharp excision into necrotic subcutaneous tissue is required to promote healing and evaluate the extent of previous healing.    Performed by: Krunal Espana DO    Consent obtained: Yes    Time out taken:  Yes    Pain Control: Topical lidocaine      Debridement:Excisional Debridement    Using curette the wound(s) was/were sharply debrided down through and including the removal of subcutaneous tissue.        Devitalized Tissue Debrided:  biofilm and slough    Pre Debridement Measurements:  Are located in the Wound Documentation Flow Sheet    All active wounds listed below with today's date are evaluated  Wound(s)    debrided this date include # : 1     Post  Debridement Measurements:  Wound 11/08/24 Axilla Left #1 (Active)   Wound Image   12/20/24 1103   Wound Etiology Non-Healing Surgical 12/20/24 1103   Dressing Status New dressing applied 12/13/24 1155   Wound Cleansed Wound cleanser;Soap and water 12/20/24 1103   Offloading for Diabetic Foot Ulcers Offloading not required 12/20/24 1103

## 2024-12-20 NOTE — PLAN OF CARE
Problem: Wound:  Goal: Will show signs of wound healing; wound closure and no evidence of infection  Description: Will show signs of wound healing; wound closure and no evidence of infection  12/20/2024 1116 by Holly Paz LPN  Outcome: Progressing  12/20/2024 1116 by Holly Paz LPN  Outcome: Progressing

## 2025-01-03 ENCOUNTER — HOSPITAL ENCOUNTER (OUTPATIENT)
Dept: WOUND CARE | Age: 30
Discharge: HOME | End: 2025-01-03
Attending: NURSE PRACTITIONER
Payer: COMMERCIAL

## 2025-01-03 DIAGNOSIS — T81.89XA NON-HEALING SURGICAL WOUND, INITIAL ENCOUNTER: ICD-10-CM

## 2025-01-03 DIAGNOSIS — L02.412 ABSCESS OF LEFT AXILLA: Primary | ICD-10-CM

## 2025-01-03 PROCEDURE — 11042 DBRDMT SUBQ TIS 1ST 20SQCM/<: CPT | Performed by: SURGERY

## 2025-01-03 PROCEDURE — 11042 DBRDMT SUBQ TIS 1ST 20SQCM/<: CPT

## 2025-01-03 RX ORDER — GENTAMICIN SULFATE 1 MG/G
OINTMENT TOPICAL ONCE
OUTPATIENT
Start: 2025-01-03 | End: 2025-01-03

## 2025-01-03 RX ORDER — LIDOCAINE HYDROCHLORIDE 20 MG/ML
JELLY TOPICAL ONCE
OUTPATIENT
Start: 2025-01-03 | End: 2025-01-03

## 2025-01-03 RX ORDER — NEOMYCIN/BACITRACIN/POLYMYXINB 3.5-400-5K
OINTMENT (GRAM) TOPICAL ONCE
OUTPATIENT
Start: 2025-01-03 | End: 2025-01-03

## 2025-01-03 RX ORDER — CLOBETASOL PROPIONATE 0.5 MG/G
OINTMENT TOPICAL ONCE
OUTPATIENT
Start: 2025-01-03 | End: 2025-01-03

## 2025-01-03 RX ORDER — MUPIROCIN 20 MG/G
OINTMENT TOPICAL ONCE
OUTPATIENT
Start: 2025-01-03 | End: 2025-01-03

## 2025-01-03 RX ORDER — SODIUM CHLOR/HYPOCHLOROUS ACID 0.033 %
SOLUTION, IRRIGATION IRRIGATION ONCE
OUTPATIENT
Start: 2025-01-03 | End: 2025-01-03

## 2025-01-03 RX ORDER — SILVER SULFADIAZINE 10 MG/G
CREAM TOPICAL ONCE
OUTPATIENT
Start: 2025-01-03 | End: 2025-01-03

## 2025-01-03 RX ORDER — BACITRACIN ZINC AND POLYMYXIN B SULFATE 500; 1000 [USP'U]/G; [USP'U]/G
OINTMENT TOPICAL ONCE
OUTPATIENT
Start: 2025-01-03 | End: 2025-01-03

## 2025-01-03 RX ORDER — TRIAMCINOLONE ACETONIDE 1 MG/G
OINTMENT TOPICAL ONCE
OUTPATIENT
Start: 2025-01-03 | End: 2025-01-03

## 2025-01-03 RX ORDER — LIDOCAINE 50 MG/G
OINTMENT TOPICAL ONCE
OUTPATIENT
Start: 2025-01-03 | End: 2025-01-03

## 2025-01-03 RX ORDER — BETAMETHASONE DIPROPIONATE 0.5 MG/G
CREAM TOPICAL ONCE
OUTPATIENT
Start: 2025-01-03 | End: 2025-01-03

## 2025-01-03 RX ORDER — GINSENG 100 MG
CAPSULE ORAL ONCE
OUTPATIENT
Start: 2025-01-03 | End: 2025-01-03

## 2025-01-03 RX ORDER — LIDOCAINE 40 MG/G
CREAM TOPICAL ONCE
OUTPATIENT
Start: 2025-01-03 | End: 2025-01-03

## 2025-01-03 RX ORDER — LIDOCAINE HYDROCHLORIDE 40 MG/ML
SOLUTION TOPICAL ONCE
OUTPATIENT
Start: 2025-01-03 | End: 2025-01-03

## 2025-01-03 NOTE — H&P
wound cleanser, or mild soap and water.    Ask the physician or nurse before getting the wound(s) wet in a shower          Wound Care Notes:  10/28/2024  EXCISION OF LEFT AXILLARY CYST Mook Baig II, MD         Orders for this week:  1/3/2025       Fax to [facility]!    Left Axilla : Wash with soap and water, pat dry.   Apply liquid silver nitrate damp gauze to wound (leave in place for 3 days then apply anasept gel and stimulen powder at home)   Cover with silicone island border.  Change Monday, Wednesday      Dispense  day quantity when ordering supplies.  Plan:        Follow Up Instructions: 2 week   Primary Wound Care Provider: Dr Espana   Call  for any questions or concerns.  Central [phone] for imaging and lab work     Treatment Note      Written Patient Dismissal Instructions Given            Electronically signed by Krunal Espana DO on 1/3/2025 at 10:21 AM

## 2025-01-03 NOTE — PATIENT INSTRUCTIONS
PHYSICIAN ORDERS AND DISCHARGE INSTRUCTIONS    Wound cleansing:     Do not scrub or use excessive force.   Wash hands with soap and water before and after dressing changes.   Prior to applying a clean dressing, cleanse wound with normal saline,               wound cleanser, or mild soap and water.    Ask the physician or nurse before getting the wound(s) wet in a shower          Wound Care Notes:  10/28/2024  EXCISION OF LEFT AXILLARY CYST Mook Baig II, MD         Orders for this week:  1/3/2025       Fax to [facility]!    Left Axilla : Wash with soap and water, pat dry.   Apply liquid silver nitrate damp gauze to wound (leave in place for 3 days then apply anasept gel and stimulen powder at home)   Cover with silicone island border.  Change Monday, Wednesday      Dispense  day quantity when ordering supplies.  Plan:        Follow Up Instructions: 2 week   Primary Wound Care Provider: Dr Espana   Call  for any questions or concerns.  Central [phone] for imaging and lab work

## 2025-01-17 ENCOUNTER — HOSPITAL ENCOUNTER (OUTPATIENT)
Dept: WOUND CARE | Age: 30
Discharge: HOME | End: 2025-01-17
Attending: NURSE PRACTITIONER
Payer: COMMERCIAL

## 2025-01-17 VITALS
RESPIRATION RATE: 20 BRPM | HEART RATE: 88 BPM | TEMPERATURE: 97 F | DIASTOLIC BLOOD PRESSURE: 57 MMHG | SYSTOLIC BLOOD PRESSURE: 105 MMHG

## 2025-01-17 DIAGNOSIS — L02.412 ABSCESS OF LEFT AXILLA: Primary | ICD-10-CM

## 2025-01-17 DIAGNOSIS — T81.89XA NON-HEALING SURGICAL WOUND, INITIAL ENCOUNTER: ICD-10-CM

## 2025-01-17 PROCEDURE — 99212 OFFICE O/P EST SF 10 MIN: CPT

## 2025-01-17 RX ORDER — LIDOCAINE 40 MG/G
CREAM TOPICAL ONCE
OUTPATIENT
Start: 2025-01-17 | End: 2025-01-17

## 2025-01-17 RX ORDER — LIDOCAINE HYDROCHLORIDE 20 MG/ML
JELLY TOPICAL ONCE
OUTPATIENT
Start: 2025-01-17 | End: 2025-01-17

## 2025-01-17 RX ORDER — NEOMYCIN/BACITRACIN/POLYMYXINB 3.5-400-5K
OINTMENT (GRAM) TOPICAL ONCE
OUTPATIENT
Start: 2025-01-17 | End: 2025-01-17

## 2025-01-17 RX ORDER — BETAMETHASONE DIPROPIONATE 0.5 MG/G
CREAM TOPICAL ONCE
OUTPATIENT
Start: 2025-01-17 | End: 2025-01-17

## 2025-01-17 RX ORDER — GINSENG 100 MG
CAPSULE ORAL ONCE
OUTPATIENT
Start: 2025-01-17 | End: 2025-01-17

## 2025-01-17 RX ORDER — LIDOCAINE HYDROCHLORIDE 40 MG/ML
SOLUTION TOPICAL ONCE
OUTPATIENT
Start: 2025-01-17 | End: 2025-01-17

## 2025-01-17 RX ORDER — BACITRACIN ZINC AND POLYMYXIN B SULFATE 500; 1000 [USP'U]/G; [USP'U]/G
OINTMENT TOPICAL ONCE
OUTPATIENT
Start: 2025-01-17 | End: 2025-01-17

## 2025-01-17 RX ORDER — GENTAMICIN SULFATE 1 MG/G
OINTMENT TOPICAL ONCE
OUTPATIENT
Start: 2025-01-17 | End: 2025-01-17

## 2025-01-17 RX ORDER — SODIUM CHLOR/HYPOCHLOROUS ACID 0.033 %
SOLUTION, IRRIGATION IRRIGATION ONCE
OUTPATIENT
Start: 2025-01-17 | End: 2025-01-17

## 2025-01-17 RX ORDER — SILVER SULFADIAZINE 10 MG/G
CREAM TOPICAL ONCE
OUTPATIENT
Start: 2025-01-17 | End: 2025-01-17

## 2025-01-17 RX ORDER — LIDOCAINE 50 MG/G
OINTMENT TOPICAL ONCE
OUTPATIENT
Start: 2025-01-17 | End: 2025-01-17

## 2025-01-17 RX ORDER — TRIAMCINOLONE ACETONIDE 1 MG/G
OINTMENT TOPICAL ONCE
OUTPATIENT
Start: 2025-01-17 | End: 2025-01-17

## 2025-01-17 RX ORDER — CLOBETASOL PROPIONATE 0.5 MG/G
OINTMENT TOPICAL ONCE
OUTPATIENT
Start: 2025-01-17 | End: 2025-01-17

## 2025-01-17 RX ORDER — MUPIROCIN 20 MG/G
OINTMENT TOPICAL ONCE
OUTPATIENT
Start: 2025-01-17 | End: 2025-01-17

## 2025-01-17 NOTE — PATIENT INSTRUCTIONS
PHYSICIAN ORDERS AND DISCHARGE INSTRUCTIONS    Wound cleansing:     Do not scrub or use excessive force.   Wash hands with soap and water before and after dressing changes.   Prior to applying a clean dressing, cleanse wound with normal saline,               wound cleanser, or mild soap and water.    Ask the physician or nurse before getting the wound(s) wet in a shower          Wound Care Notes:  10/28/2024  EXCISION OF LEFT AXILLARY CYST Mook Baig II, MD         Orders for this week:  2025       Fax to [facility]!    Left Axilla : Wash with soap and water, pat dry.   Cover with silicone island border.      Dispense 30 day quantity when ordering supplies.  Plan:        Follow Up Instructions: Discharged   Primary Wound Care Provider: Dr Espana   Call  for any questions or concerns.  Central [phone] for imaging and lab work

## 2025-01-18 NOTE — H&P
sugar testing 4 times per day. 150 each 11    blood glucose test strips (ACCU-CHEK GUIDE) strip 1 each by Does not apply route 4 times daily (with meals and nightly) 120 each 5    blood glucose monitor kit and supplies Dispense 1 glucometer starter kit for blood sugar testing 4 times daily. 1 kit 0    blood glucose monitor strips Test blood sugars 4 times a day 150 strip 11    Aimsco Ultra Thin Lancets MISC For blood sugar testing 4 times per day. 150 each 11    Lancet Devices (ADJUSTABLE LANCING DEVICE) MISC Use as directed for blood glucose monitoring. 1 each 0    lisinopril (PRINIVIL;ZESTRIL) 10 MG tablet Take 1 tablet by mouth daily 90 tablet 3    glucose 4 g chewable tablet Take 4 tablets by mouth as needed for Low blood sugar 30 tablet 0    Elastic Bandages & Supports (JOBST KNEE HIGH COMPRESSION SM) MISC 1 each by Does not apply route daily as needed (edema legs) 2 each 2    Lancets MISC 1 each by Does not apply route 3 times daily 300 each 2     No current facility-administered medications on file prior to encounter.       PROBLEM LIST    Patient Active Problem List   Diagnosis    Type 1 diabetes mellitus with diabetic polyneuropathy (HCC)    DM (diabetes mellitus) (HCC)    Chronic kidney disease (CKD) stage G1/A1, glomerular filtration rate (GFR) equal to or greater than 90 mL/min/1.73 square meter and albuminuria creatinine ratio less than 30 mg/g    Intellectual disability    Illiterate    Localized swelling of both lower legs    Hypoglycemia    Microalbuminuria    Sebaceous cyst of left axilla    Postoperative fever    WD-Nonhealing surgical wound    WD-Abscess of left axilla       REVIEW OF SYSTEMS    Pertinent items are noted in HPI.      Objective:      BP (!) 105/57   Pulse 88   Temp 97 °F (36.1 °C) (Temporal)   Resp 20     BP Readings from Last 3 Encounters:   01/17/25 (!) 105/57   12/20/24 126/77   12/19/24 118/76        PHYSICAL EXAM  General: No acute distress  Respiratory: Chest rise equal

## 2025-02-05 ENCOUNTER — OFFICE VISIT (OUTPATIENT)
Dept: FAMILY MEDICINE CLINIC | Age: 30
End: 2025-02-05
Payer: COMMERCIAL

## 2025-02-05 VITALS
WEIGHT: 156.2 LBS | BODY MASS INDEX: 26.02 KG/M2 | OXYGEN SATURATION: 98 % | HEIGHT: 65 IN | SYSTOLIC BLOOD PRESSURE: 108 MMHG | HEART RATE: 77 BPM | DIASTOLIC BLOOD PRESSURE: 68 MMHG

## 2025-02-05 DIAGNOSIS — N18.1 TYPE 1 DIABETES MELLITUS WITH STAGE 1 CHRONIC KIDNEY DISEASE (HCC): Primary | ICD-10-CM

## 2025-02-05 DIAGNOSIS — E10.22 TYPE 1 DIABETES MELLITUS WITH STAGE 1 CHRONIC KIDNEY DISEASE (HCC): Primary | ICD-10-CM

## 2025-02-05 DIAGNOSIS — E10.42 TYPE 1 DIABETES MELLITUS WITH DIABETIC POLYNEUROPATHY (HCC): ICD-10-CM

## 2025-02-05 DIAGNOSIS — Z97.8 USES SELF-APPLIED CONTINUOUS GLUCOSE MONITORING DEVICE: ICD-10-CM

## 2025-02-05 LAB — HBA1C MFR BLD: 7 %

## 2025-02-05 PROCEDURE — 99214 OFFICE O/P EST MOD 30 MIN: CPT

## 2025-02-05 PROCEDURE — G8427 DOCREV CUR MEDS BY ELIG CLIN: HCPCS

## 2025-02-05 PROCEDURE — 1036F TOBACCO NON-USER: CPT

## 2025-02-05 PROCEDURE — 3051F HG A1C>EQUAL 7.0%<8.0%: CPT

## 2025-02-05 PROCEDURE — 83036 HEMOGLOBIN GLYCOSYLATED A1C: CPT

## 2025-02-05 PROCEDURE — 2022F DILAT RTA XM EVC RTNOPTHY: CPT

## 2025-02-05 PROCEDURE — G8419 CALC BMI OUT NRM PARAM NOF/U: HCPCS

## 2025-02-05 RX ORDER — BLOOD PRESSURE TEST KIT
KIT MISCELLANEOUS
Qty: 150 EACH | Refills: 11 | Status: SHIPPED | OUTPATIENT
Start: 2025-02-05

## 2025-02-05 RX ORDER — INSULIN ASPART 100 [IU]/ML
INJECTION, SOLUTION INTRAVENOUS; SUBCUTANEOUS
Qty: 5 ADJUSTABLE DOSE PRE-FILLED PEN SYRINGE | Refills: 2 | Status: SHIPPED | OUTPATIENT
Start: 2025-02-05

## 2025-02-05 RX ORDER — INSULIN GLARGINE 300 U/ML
20 INJECTION, SOLUTION SUBCUTANEOUS DAILY
Qty: 2 ADJUSTABLE DOSE PRE-FILLED PEN SYRINGE | Refills: 2 | Status: SHIPPED | OUTPATIENT
Start: 2025-02-05

## 2025-02-05 ASSESSMENT — PATIENT HEALTH QUESTIONNAIRE - PHQ9: DEPRESSION UNABLE TO ASSESS: FUNCTIONAL CAPACITY MOTIVATION LIMITS ACCURACY

## 2025-02-05 NOTE — ASSESSMENT & PLAN NOTE
Chronic, not at goal (unstable), changes made today: see below    Orders:    insulin glargine, 1 unit dial, (TOUJEO SOLOSTAR) 300 UNIT/ML concentrated injection pen; Inject 20 Units into the skin daily    insulin aspart (NOVOLOG FLEXPEN) 100 UNIT/ML injection pen; Inject into the skin 3 times daily approximately 15 minutes prior to meals based on the sliding scale: if glucose is less than 130=0 units, 131-150=2 units, 151-200=4 units, 201-250=6 units, 251-300=8 units, 301-350=10 units, 351-400=12 units, 401 & higher=14 units.  Add additional 1-5 units for carb coverage.  Max daily dose: 60 units.    Insulin Pen Needle 32G X 4 MM MISC; Use new pen needle with each insulin dose 2 times daily.    Alcohol Swabs PADS; Use as directed for blood sugar testing 4 times per day.

## 2025-02-05 NOTE — PROGRESS NOTES
face with the patient discussing the diagnosis and importance of compliance with the treatment plan as well as documenting on the day of the visit.    The patient (or guardian, if applicable) and other individuals in attendance with the patient were advised that Artificial Intelligence will be utilized during this visit to record, process the conversation to generate a clinical note, and support improvement of the AI technology. The patient (or guardian, if applicable) and other individuals in attendance at the appointment consented to the use of AI, including the recording.      An electronic signature was used to authenticate this note.    --Vivian Walker, JOAN - CNP

## 2025-02-11 ENCOUNTER — TELEPHONE (OUTPATIENT)
Dept: FAMILY MEDICINE CLINIC | Age: 30
End: 2025-02-11

## 2025-02-11 NOTE — TELEPHONE ENCOUNTER
We may dispense a sample if available, otherwise she will need to check other pharmacies for availability, switch to an alternative CGM like Sahil or perform fingersticks until she can obtain a sensor.

## 2025-02-13 NOTE — ASSESSMENT & PLAN NOTE
Orders:    Insulin Pen Needle 32G X 4 MM MISC; Use new pen needle with each insulin dose 2 times daily.

## 2025-02-17 ENCOUNTER — TELEPHONE (OUTPATIENT)
Dept: FAMILY MEDICINE CLINIC | Age: 30
End: 2025-02-17

## 2025-02-17 DIAGNOSIS — R80.9 MICROALBUMINURIA: ICD-10-CM

## 2025-02-17 RX ORDER — LISINOPRIL 10 MG/1
10 TABLET ORAL DAILY
Qty: 90 TABLET | Refills: 0 | Status: SHIPPED | OUTPATIENT
Start: 2025-02-17

## 2025-02-17 NOTE — TELEPHONE ENCOUNTER
Patient called asking if you can call in her Lisinopril 10 mg please send her medication to [facility]

## 2025-03-17 NOTE — DISCHARGE SUMMARY
Discharge Summary    Name:  Meño Daugherty /Age/Sex: 1995  (30 y.o. female)   MRN & CSN:  [id number] & [id number] Admission Date/Time: 2020  8:00 AM   Attending:  Nay Barajas DO Discharging Physician: Nay Barajas DO     HPI and Hospital Course:   Meño Daugherty is a 30 y.o.  female  who presents with Nausea; Emesis; and Shortness of Breath    · DKA, uncontrolled type 2. Required insulin gtt initially then SSI prior to discharge. A1C 13. Poor adherence. Discussed goals of care. · Mild PCM  · Hyponatremia, poor po intake  · hypophos and hypokalemia, resolved after iv treatment    The patient expressed appropriate understanding of and agreement with the discharge recommendations, medications, and plan. Consults this admission:  IP CONSULT TO HOSPITALIST  IP CONSULT TO ENDOCRINOLOGY  IP CONSULT TO DIABETES EDUCATOR    Discharge Instruction:   Follow up appointments: endocrinologist  Primary care physician:  within 2 weeks    Diet:  General/cardiac/ADA/as tolerated  Activity: {discharge activity: as tolerated  Disposition: Discharged to:   [x]Home, [][facility], []SNF, []Acute Rehab, []Hospice   Condition on discharge: Stable    Discharge Medications:      Estela Perales   Home Medication Instructions PILO:[id number]    Printed on:20 0800   Medication Information                      insulin glargine (LANTUS) 100 UNIT/ML injection vial  Inject 40 Units into the skin nightly             insulin lispro (HUMALOG) 100 UNIT/ML injection vial  Inject 0-12 Units into the skin 3 times daily (with meals)             losartan (COZAAR) 25 MG tablet  Take 1 tablet by mouth daily                 Objective Findings at Discharge:   /79   Pulse 98   Temp 97.9 °F (36.6 °C) (Oral)   Resp 16   Ht 5' 4\" (1.626 m)   Wt 125 lb (56.7 kg)   LMP 2020   SpO2 99%   BMI 21.46 kg/m²            PHYSICAL EXAM   GEN Awake female, laying in bed in no apparent distress.  Appears given Negative

## 2025-03-19 ENCOUNTER — OFFICE VISIT (OUTPATIENT)
Dept: FAMILY MEDICINE CLINIC | Age: 30
End: 2025-03-19
Payer: COMMERCIAL

## 2025-03-19 VITALS
WEIGHT: 154.2 LBS | SYSTOLIC BLOOD PRESSURE: 114 MMHG | DIASTOLIC BLOOD PRESSURE: 64 MMHG | BODY MASS INDEX: 25.69 KG/M2 | OXYGEN SATURATION: 99 % | HEIGHT: 65 IN | HEART RATE: 98 BPM

## 2025-03-19 DIAGNOSIS — E10.22 TYPE 1 DIABETES MELLITUS WITH STAGE 1 CHRONIC KIDNEY DISEASE (HCC): Primary | ICD-10-CM

## 2025-03-19 DIAGNOSIS — N18.1 TYPE 1 DIABETES MELLITUS WITH STAGE 1 CHRONIC KIDNEY DISEASE (HCC): Primary | ICD-10-CM

## 2025-03-19 DIAGNOSIS — E10.3522: ICD-10-CM

## 2025-03-19 DIAGNOSIS — E10.649 HYPOGLYCEMIA UNAWARENESS ASSOCIATED WITH TYPE 1 DIABETES MELLITUS: ICD-10-CM

## 2025-03-19 DIAGNOSIS — Z97.8 USES SELF-APPLIED CONTINUOUS GLUCOSE MONITORING DEVICE: ICD-10-CM

## 2025-03-19 DIAGNOSIS — E10.3521: ICD-10-CM

## 2025-03-19 PROCEDURE — 95251 CONT GLUC MNTR ANALYSIS I&R: CPT

## 2025-03-19 PROCEDURE — G8419 CALC BMI OUT NRM PARAM NOF/U: HCPCS

## 2025-03-19 PROCEDURE — 3051F HG A1C>EQUAL 7.0%<8.0%: CPT

## 2025-03-19 PROCEDURE — 1036F TOBACCO NON-USER: CPT

## 2025-03-19 PROCEDURE — 99214 OFFICE O/P EST MOD 30 MIN: CPT

## 2025-03-19 PROCEDURE — G8427 DOCREV CUR MEDS BY ELIG CLIN: HCPCS

## 2025-03-19 PROCEDURE — 2022F DILAT RTA XM EVC RTNOPTHY: CPT

## 2025-03-19 RX ORDER — INSULIN GLARGINE 300 U/ML
25 INJECTION, SOLUTION SUBCUTANEOUS DAILY
Qty: 2 ADJUSTABLE DOSE PRE-FILLED PEN SYRINGE | Refills: 2 | Status: SHIPPED | OUTPATIENT
Start: 2025-03-19

## 2025-03-19 RX ORDER — PREDNISOLONE ACETATE 10 MG/ML
SUSPENSION/ DROPS OPHTHALMIC
COMMUNITY
Start: 2025-03-13

## 2025-03-19 ASSESSMENT — PATIENT HEALTH QUESTIONNAIRE - PHQ9
8. MOVING OR SPEAKING SO SLOWLY THAT OTHER PEOPLE COULD HAVE NOTICED. OR THE OPPOSITE, BEING SO FIGETY OR RESTLESS THAT YOU HAVE BEEN MOVING AROUND A LOT MORE THAN USUAL: NOT AT ALL
SUM OF ALL RESPONSES TO PHQ QUESTIONS 1-9: 0
SUM OF ALL RESPONSES TO PHQ QUESTIONS 1-9: 0
10. IF YOU CHECKED OFF ANY PROBLEMS, HOW DIFFICULT HAVE THESE PROBLEMS MADE IT FOR YOU TO DO YOUR WORK, TAKE CARE OF THINGS AT HOME, OR GET ALONG WITH OTHER PEOPLE: NOT DIFFICULT AT ALL
6. FEELING BAD ABOUT YOURSELF - OR THAT YOU ARE A FAILURE OR HAVE LET YOURSELF OR YOUR FAMILY DOWN: NOT AT ALL
9. THOUGHTS THAT YOU WOULD BE BETTER OFF DEAD, OR OF HURTING YOURSELF: NOT AT ALL
SUM OF ALL RESPONSES TO PHQ QUESTIONS 1-9: 0
4. FEELING TIRED OR HAVING LITTLE ENERGY: NOT AT ALL
1. LITTLE INTEREST OR PLEASURE IN DOING THINGS: NOT AT ALL
3. TROUBLE FALLING OR STAYING ASLEEP: NOT AT ALL
7. TROUBLE CONCENTRATING ON THINGS, SUCH AS READING THE NEWSPAPER OR WATCHING TELEVISION: NOT AT ALL
2. FEELING DOWN, DEPRESSED OR HOPELESS: NOT AT ALL
SUM OF ALL RESPONSES TO PHQ QUESTIONS 1-9: 0
5. POOR APPETITE OR OVEREATING: NOT AT ALL

## 2025-03-19 NOTE — PROGRESS NOTES
prompting her to take 2 units of insulin. Her grandfather provided her with a device to monitor her insulin dosage, but she is uncertain about the appropriate dosage. She is currently on a regimen of 20 units of Toujeo, taken in the morning, and has a sufficient supply of NovoLog pens at home. She does not use vials or syringes, relying solely on insulin pens.    She had surgery on her left eye and is regaining some vision. She can see the letters, but none of the big ones. She is scheduled to see the eye doctor again on Friday, 21st. She is using eyedrops twice a day.    Supplemental Information  She still has a wound in her arm, which is healing. She had a cyst removed from her arm.    MEDICATIONS  Toujeo, NovoLog.    Key Antihyperglycemic Medications              insulin glargine, 1 unit dial, (TOUJEO SOLOSTAR) 300 UNIT/ML concentrated injection pen (Taking) Inject 20 Units into the skin daily    insulin aspart (NOVOLOG FLEXPEN) 100 UNIT/ML injection pen (Taking) Inject into the skin 3 times daily approximately 15 minutes prior to meals based on the sliding scale: if glucose is less than 130=0 units, 131-150=2 units, 151-200=4 units, 201-250=6 units, 251-300=8 units, 301-350=10 units, 351-400=12 units, 401 & higher=14 units.  Add additional 1-5 units for carb coverage.  Max daily dose: 60 units.    glucose 4 g chewable tablet (Taking As Needed) Take 4 tablets by mouth as needed for Low blood sugar          Physical Exam  The patient is alert and oriented, cooperative with today's visit.   She does have visual deficits.  Breathing is easy and normal. No shortness of breath, cough, or wheezes noted.    Results  Laboratory Studies  Blood sugar is 134. Time in range for blood sugar is 74%.    Hemoglobin A1C   Date Value Ref Range Status   02/05/2025 7.0 % Final       Lab Results   Component Value Date     12/03/2024    K 3.7 12/03/2024     12/03/2024    CO2 24 12/03/2024    BUN 14 12/03/2024    CREATININE 
1.89

## 2025-03-21 LAB — DIABETIC RETINOPATHY: POSITIVE

## 2025-04-03 ENCOUNTER — OFFICE VISIT (OUTPATIENT)
Dept: FAMILY MEDICINE CLINIC | Age: 30
End: 2025-04-03
Payer: COMMERCIAL

## 2025-04-03 VITALS
WEIGHT: 159 LBS | HEART RATE: 89 BPM | SYSTOLIC BLOOD PRESSURE: 118 MMHG | BODY MASS INDEX: 26.46 KG/M2 | DIASTOLIC BLOOD PRESSURE: 70 MMHG | OXYGEN SATURATION: 98 %

## 2025-04-03 DIAGNOSIS — N63.10 MASSES OF BOTH BREASTS: Primary | ICD-10-CM

## 2025-04-03 DIAGNOSIS — F79 INTELLECTUAL DISABILITY: ICD-10-CM

## 2025-04-03 DIAGNOSIS — E10.42 TYPE 1 DIABETES MELLITUS WITH DIABETIC POLYNEUROPATHY (HCC): ICD-10-CM

## 2025-04-03 DIAGNOSIS — T80.90XD INJECTION SITE REACTION, SUBSEQUENT ENCOUNTER: ICD-10-CM

## 2025-04-03 DIAGNOSIS — N63.20 MASSES OF BOTH BREASTS: Primary | ICD-10-CM

## 2025-04-03 PROBLEM — R50.82 POSTOPERATIVE FEVER: Status: RESOLVED | Noted: 2024-10-28 | Resolved: 2025-04-03

## 2025-04-03 PROCEDURE — G8427 DOCREV CUR MEDS BY ELIG CLIN: HCPCS | Performed by: FAMILY MEDICINE

## 2025-04-03 PROCEDURE — 2022F DILAT RTA XM EVC RTNOPTHY: CPT | Performed by: FAMILY MEDICINE

## 2025-04-03 PROCEDURE — 99214 OFFICE O/P EST MOD 30 MIN: CPT | Performed by: FAMILY MEDICINE

## 2025-04-03 PROCEDURE — 3051F HG A1C>EQUAL 7.0%<8.0%: CPT | Performed by: FAMILY MEDICINE

## 2025-04-03 NOTE — PROGRESS NOTES
Patient ID: Estela Perales 1995    Chief Complaint   Patient presents with    Diabetes    Breast Mass     Left breast    Mass     On abd. X 3 months       HPI     History of Present Illness  The patient is a 29-year-old female with type 1 diabetes here for follow-up.    Blood Glucose Fluctuations.   Type 1 DM.  Sees NP for this  - Reports fluctuations in blood glucose levels, experiencing hyperglycemia and hypoglycemia.  - Using a continuous glucose monitor.  - Under care of Shantel, next appointment on 05/07/2025.    Breast Masses  - Palpable, non-tender masses in both breasts, never got call for mammo  Abdominal Mass      - Mass at insulin injection site on abdomen.  - Advised to avoid this area for future injections.    Eye Surgery  - Left eye surgery completed, reports improved vision.  - Right eye surgery pending, follow-up next Friday.    Supplemental information: PAST SURGICAL HISTORY:  - Surgery on underarm  - Surgery on left eye      Patient Active Problem List   Diagnosis    Type 1 diabetes mellitus with diabetic polyneuropathy (HCC)    DM (diabetes mellitus) (HCC)    Chronic kidney disease (CKD) stage G1/A1, glomerular filtration rate (GFR) equal to or greater than 90 mL/min/1.73 square meter and albuminuria creatinine ratio less than 30 mg/g    Intellectual disability    Illiterate    Localized swelling of both lower legs    Hypoglycemia    Microalbuminuria    Sebaceous cyst of left axilla    Postoperative fever    WD-Nonhealing surgical wound    WD-Abscess of left axilla       Past Surgical History:   Procedure Laterality Date    AXILLARY SURGERY Left 10/28/2024    EXCISION OF LEFT AXILLARY CYST performed by Mook Baig II, MD at [facility] OR    CATARACT EXTRACTION Left     EYE SURGERY         Family History   Problem Relation Age of Onset    Diabetes Mother     High Blood Pressure Mother     Kidney Disease Mother     Vision Loss Mother     Diabetes Type 1  Father     Diabetes Type 1  Brother

## 2025-05-05 ENCOUNTER — HOSPITAL ENCOUNTER (OUTPATIENT)
Dept: ULTRASOUND IMAGING | Age: 30
Discharge: HOME | End: 2025-05-05
Payer: COMMERCIAL

## 2025-05-05 ENCOUNTER — HOSPITAL ENCOUNTER (OUTPATIENT)
Dept: WOMENS IMAGING | Age: 30
Discharge: HOME | End: 2025-05-05
Payer: COMMERCIAL

## 2025-05-05 VITALS — BODY MASS INDEX: 25.83 KG/M2 | WEIGHT: 155 LBS | HEIGHT: 65 IN

## 2025-05-05 DIAGNOSIS — N63.10 MASSES OF BOTH BREASTS: ICD-10-CM

## 2025-05-05 DIAGNOSIS — N63.20 MASSES OF BOTH BREASTS: ICD-10-CM

## 2025-05-05 PROCEDURE — 76642 ULTRASOUND BREAST LIMITED: CPT

## 2025-05-05 PROCEDURE — G0279 TOMOSYNTHESIS, MAMMO: HCPCS

## 2025-05-07 ENCOUNTER — OFFICE VISIT (OUTPATIENT)
Dept: FAMILY MEDICINE CLINIC | Age: 30
End: 2025-05-07
Payer: COMMERCIAL

## 2025-05-07 VITALS
SYSTOLIC BLOOD PRESSURE: 120 MMHG | BODY MASS INDEX: 27.49 KG/M2 | HEART RATE: 89 BPM | WEIGHT: 165 LBS | DIASTOLIC BLOOD PRESSURE: 78 MMHG | HEIGHT: 65 IN | OXYGEN SATURATION: 94 %

## 2025-05-07 DIAGNOSIS — Z97.8 USES SELF-APPLIED CONTINUOUS GLUCOSE MONITORING DEVICE: ICD-10-CM

## 2025-05-07 DIAGNOSIS — F79 INTELLECTUAL DISABILITY: ICD-10-CM

## 2025-05-07 DIAGNOSIS — E10.649 HYPOGLYCEMIA UNAWARENESS ASSOCIATED WITH TYPE 1 DIABETES MELLITUS (HCC): ICD-10-CM

## 2025-05-07 DIAGNOSIS — E10.22 TYPE 1 DIABETES MELLITUS WITH STAGE 1 CHRONIC KIDNEY DISEASE (HCC): Primary | ICD-10-CM

## 2025-05-07 DIAGNOSIS — N18.1 TYPE 1 DIABETES MELLITUS WITH STAGE 1 CHRONIC KIDNEY DISEASE (HCC): Primary | ICD-10-CM

## 2025-05-07 PROCEDURE — 95251 CONT GLUC MNTR ANALYSIS I&R: CPT

## 2025-05-07 PROCEDURE — G8419 CALC BMI OUT NRM PARAM NOF/U: HCPCS

## 2025-05-07 PROCEDURE — 99214 OFFICE O/P EST MOD 30 MIN: CPT

## 2025-05-07 PROCEDURE — G8427 DOCREV CUR MEDS BY ELIG CLIN: HCPCS

## 2025-05-07 PROCEDURE — 1036F TOBACCO NON-USER: CPT

## 2025-05-07 PROCEDURE — 2022F DILAT RTA XM EVC RTNOPTHY: CPT

## 2025-05-07 PROCEDURE — 3051F HG A1C>EQUAL 7.0%<8.0%: CPT

## 2025-05-07 RX ORDER — INSULIN GLARGINE 300 U/ML
25 INJECTION, SOLUTION SUBCUTANEOUS DAILY
Qty: 2 ADJUSTABLE DOSE PRE-FILLED PEN SYRINGE | Refills: 2 | Status: SHIPPED | OUTPATIENT
Start: 2025-05-07

## 2025-05-07 RX ORDER — PEN NEEDLE, DIABETIC 32GX 5/32"
NEEDLE, DISPOSABLE MISCELLANEOUS
COMMUNITY
Start: 2025-04-23 | End: 2025-05-19

## 2025-05-07 RX ORDER — INSULIN ASPART 100 [IU]/ML
INJECTION, SOLUTION INTRAVENOUS; SUBCUTANEOUS
Qty: 5 ADJUSTABLE DOSE PRE-FILLED PEN SYRINGE | Refills: 2 | Status: SHIPPED | OUTPATIENT
Start: 2025-05-07

## 2025-05-07 RX ORDER — ACYCLOVIR 400 MG/1
TABLET ORAL
Qty: 3 EACH | Refills: 11 | Status: SHIPPED | OUTPATIENT
Start: 2025-05-07

## 2025-05-07 NOTE — PROGRESS NOTES
Estela Perales (:  1995) is a 29 y.o. female,Established patient, here for evaluation of the following chief complaint(s):  Follow-up (7 week glucose monitoring)      ASSESSMENT/PLAN:  Assessment & Plan  Type 1 diabetes mellitus with stage 1 chronic kidney disease (HCC)   Orders:    Continuous Glucose Sensor (DEXCOM G7 SENSOR) MISC; Apply new sensor every 10 days as directed for continuous glucose monitoring.    insulin glargine, 1 unit dial, (TOUJEO SOLOSTAR) 300 UNIT/ML concentrated injection pen; Inject 25 Units into the skin daily    insulin aspart (NOVOLOG FLEXPEN) 100 UNIT/ML injection pen; Inject into the skin 3 times daily approximately 15 minutes prior to meals based on the sliding scale: if glucose is less than 130=0 units, 131-150=2 units, 151-200=4 units, 201-250=6 units, 251-300=8 units, 301-350=10 units, 351-400=12 units, 401 & higher=14 units.  Add additional 1-5 units for carb coverage.  Max daily dose: 60 units.    Uses self-applied continuous glucose monitoring device   Orders:    GLUCOSE MONITOR, 72 HOUR, PHYS INTERP    Hypoglycemia unawareness associated with type 1 diabetes mellitus (HCC)        Intellectual disability          Assessment & Plan  1. Type 1 diabetes mellitus.  - Her time in range is currently at 69%, with an average blood glucose level of 149. This is a commendable improvement, but there is still room for enhancement. The goal is to increase her time in range and reduce the frequency of hypoglycemic episodes.  - Her blood glucose levels were within the normal range yesterday morning but escalated to nearly 300 by noon, likely due to delayed insulin administration post-meal. Consequently, she experienced a rapid drop in blood glucose levels around 1:30 PM. It is crucial to administer insulin prior to meals, even when blood glucose levels are within the normal range, to manage carbohydrate intake effectively.  - Overconsumption of food to elevate blood glucose

## 2025-05-07 NOTE — ASSESSMENT & PLAN NOTE
Orders:    Continuous Glucose Sensor (DEXCOM G7 SENSOR) MISC; Apply new sensor every 10 days as directed for continuous glucose monitoring.    insulin glargine, 1 unit dial, (TOUJEO SOLOSTAR) 300 UNIT/ML concentrated injection pen; Inject 25 Units into the skin daily    insulin aspart (NOVOLOG FLEXPEN) 100 UNIT/ML injection pen; Inject into the skin 3 times daily approximately 15 minutes prior to meals based on the sliding scale: if glucose is less than 130=0 units, 131-150=2 units, 151-200=4 units, 201-250=6 units, 251-300=8 units, 301-350=10 units, 351-400=12 units, 401 & higher=14 units.  Add additional 1-5 units for carb coverage.  Max daily dose: 60 units.

## 2025-05-08 ENCOUNTER — RESULTS FOLLOW-UP (OUTPATIENT)
Dept: FAMILY MEDICINE CLINIC | Age: 30
End: 2025-05-08

## 2025-05-14 ENCOUNTER — HOSPITAL ENCOUNTER (OUTPATIENT)
Dept: WOMENS IMAGING | Age: 30
Discharge: HOME | End: 2025-05-14
Payer: COMMERCIAL

## 2025-05-14 DIAGNOSIS — R92.8 ABNORMAL MAMMOGRAM OF RIGHT BREAST: ICD-10-CM

## 2025-05-14 PROCEDURE — 88341 IMHCHEM/IMCYTCHM EA ADD ANTB: CPT | Performed by: PATHOLOGY

## 2025-05-14 PROCEDURE — 88305 TISSUE EXAM BY PATHOLOGIST: CPT | Performed by: PATHOLOGY

## 2025-05-14 PROCEDURE — 88342 IMHCHEM/IMCYTCHM 1ST ANTB: CPT | Performed by: PATHOLOGY

## 2025-05-14 PROCEDURE — 2709999900 MAM STEREO BREAST BX W LOC DEVICE 1ST LESION RIGHT

## 2025-05-16 ENCOUNTER — TELEPHONE (OUTPATIENT)
Dept: FAMILY MEDICINE CLINIC | Age: 30
End: 2025-05-16

## 2025-05-16 DIAGNOSIS — R92.8 ABNORMAL MAMMOGRAM: Primary | ICD-10-CM

## 2025-05-16 LAB — SURGICAL PATHOLOGY REPORT: NORMAL

## 2025-05-16 NOTE — TELEPHONE ENCOUNTER
I called patient and told her the biopsy results were negative.  No cancer.  She understands.    Will need repeat imaging in 6 months.    Please cancel her biopsy result appt  (for some reason, it was set for 5/28).

## 2025-06-06 DIAGNOSIS — E10.42 TYPE 1 DIABETES MELLITUS WITH DIABETIC POLYNEUROPATHY (HCC): ICD-10-CM

## 2025-06-16 DIAGNOSIS — R80.9 MICROALBUMINURIA: ICD-10-CM

## 2025-06-16 RX ORDER — LISINOPRIL 10 MG/1
10 TABLET ORAL DAILY
Qty: 90 TABLET | Refills: 2 | Status: SHIPPED | OUTPATIENT
Start: 2025-06-16

## 2025-06-18 ENCOUNTER — OFFICE VISIT (OUTPATIENT)
Dept: FAMILY MEDICINE CLINIC | Age: 30
End: 2025-06-18
Payer: COMMERCIAL

## 2025-06-18 VITALS
HEART RATE: 81 BPM | DIASTOLIC BLOOD PRESSURE: 70 MMHG | SYSTOLIC BLOOD PRESSURE: 112 MMHG | BODY MASS INDEX: 26.79 KG/M2 | WEIGHT: 161 LBS | OXYGEN SATURATION: 98 %

## 2025-06-18 DIAGNOSIS — N18.1 TYPE 1 DIABETES MELLITUS WITH STAGE 1 CHRONIC KIDNEY DISEASE (HCC): Primary | ICD-10-CM

## 2025-06-18 DIAGNOSIS — E10.3522: ICD-10-CM

## 2025-06-18 DIAGNOSIS — E10.3521: ICD-10-CM

## 2025-06-18 DIAGNOSIS — E10.22 TYPE 1 DIABETES MELLITUS WITH STAGE 1 CHRONIC KIDNEY DISEASE (HCC): Primary | ICD-10-CM

## 2025-06-18 DIAGNOSIS — Z97.8 USES SELF-APPLIED CONTINUOUS GLUCOSE MONITORING DEVICE: ICD-10-CM

## 2025-06-18 DIAGNOSIS — E10.649 HYPOGLYCEMIA UNAWARENESS ASSOCIATED WITH TYPE 1 DIABETES MELLITUS (HCC): ICD-10-CM

## 2025-06-18 DIAGNOSIS — E10.42 TYPE 1 DIABETES MELLITUS WITH DIABETIC POLYNEUROPATHY (HCC): ICD-10-CM

## 2025-06-18 DIAGNOSIS — F79 INTELLECTUAL DISABILITY: ICD-10-CM

## 2025-06-18 PROCEDURE — G8419 CALC BMI OUT NRM PARAM NOF/U: HCPCS

## 2025-06-18 PROCEDURE — G8427 DOCREV CUR MEDS BY ELIG CLIN: HCPCS

## 2025-06-18 PROCEDURE — 3051F HG A1C>EQUAL 7.0%<8.0%: CPT

## 2025-06-18 PROCEDURE — 99214 OFFICE O/P EST MOD 30 MIN: CPT

## 2025-06-18 PROCEDURE — 95251 CONT GLUC MNTR ANALYSIS I&R: CPT

## 2025-06-18 PROCEDURE — 2022F DILAT RTA XM EVC RTNOPTHY: CPT

## 2025-06-18 PROCEDURE — 1036F TOBACCO NON-USER: CPT

## 2025-06-18 RX ORDER — INSULIN GLARGINE 300 U/ML
25 INJECTION, SOLUTION SUBCUTANEOUS DAILY
Qty: 2 ADJUSTABLE DOSE PRE-FILLED PEN SYRINGE | Refills: 2 | Status: SHIPPED | OUTPATIENT
Start: 2025-06-18

## 2025-06-18 RX ORDER — ACYCLOVIR 400 MG/1
TABLET ORAL
Qty: 3 EACH | Refills: 11 | Status: SHIPPED | OUTPATIENT
Start: 2025-06-18

## 2025-06-18 RX ORDER — INSULIN ASPART 100 [IU]/ML
INJECTION, SOLUTION INTRAVENOUS; SUBCUTANEOUS
Qty: 5 ADJUSTABLE DOSE PRE-FILLED PEN SYRINGE | Refills: 2 | Status: SHIPPED | OUTPATIENT
Start: 2025-06-18

## 2025-06-18 NOTE — PROGRESS NOTES
Estela Perales (:  1995) is a 30 y.o. female,Established patient, here for evaluation of the following chief complaint(s):  Follow-up      ASSESSMENT/PLAN:  Assessment & Plan  Type 1 diabetes mellitus with stage 1 chronic kidney disease (HCC)   Orders:    Continuous Glucose Sensor (DEXCOM G7 SENSOR) MISC; Apply new sensor every 10 days as directed for continuous glucose monitoring.    insulin glargine, 1 unit dial, (TOUJEO SOLOSTAR) 300 UNIT/ML concentrated injection pen; Inject 25 Units into the skin daily    insulin aspart (NOVOLOG FLEXPEN) 100 UNIT/ML injection pen; Inject into the skin 3 times daily approximately 15 minutes prior to meals based on the sliding scale: if glucose is less than 130=0 units, 131-150=2 units, 151-200=4 units, 201-250=6 units, 251-300=8 units, 301-350=10 units, 351-400=12 units, 401 & higher=14 units.  Add additional 1-5 units for carb coverage.  Max daily dose: 60 units.    Insulin Pen Needle 32G X 4 MM MISC; Use new pen needle with each insulin dose 2 times daily.    Type 1 diabetes mellitus with diabetic polyneuropathy (HCC)   Orders:    glucose 4 g chewable tablet; Take 4 tablets by mouth as needed for Low blood sugar    Insulin Pen Needle 32G X 4 MM MISC; Use new pen needle with each insulin dose 2 times daily.    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 Diabetes Mellitus.  - Blood glucose levels have been fluctuating, with a significant increase noted on 2025, reaching approximately

## 2025-06-20 ENCOUNTER — TELEPHONE (OUTPATIENT)
Dept: FAMILY MEDICINE CLINIC | Age: 30
End: 2025-06-20

## 2025-06-20 NOTE — TELEPHONE ENCOUNTER
Close reason: Prior Authorization not required for patient/medication  Payer: Auto Search Patient's Payer Case ID: [id number]    [phone]  Note from payer: [facility] this prior [facility] will not be required.  View History  Pharmacy Benefits   Open Encounter MAGDIEL CASTREJON N  -  MEDICAID ([facility])    Covered: Retail, Mail Order    Unknown: Specialty, Long-Term Care  Member ID: [id number] BIN: [id number] : 1995   Group ID:   PCN: [id number] Legal sex: F   Group name:    Address: [address] [id number]    Medication Being Authorized    insulin aspart (NOVOLOG FLEXPEN) 100 UNIT/ML injection pen  Inject into the skin 3 times daily approximately 15 minutes prior to meals based on the sliding scale: if glucose is less than 130=0 units, 131-150=2 units, 151-200=4 units, 201-250=6 units, 251-300=8 units, 301-350=10 units, 351-400=12 units, 401 & higher=14 units.  Add additional 1-5 units for carb coverage.  Max daily dose: 60 units.  Dispense: 5 Adjustable Dose Pre-filled Pen Syringe Refills: 2   Start: 2025   Class: Normal Diagnoses: Type 1 diabetes mellitus with stage 1 chronic kidney disease (HCC)   This order has been released to its destination.  To be filled at: [facility] [id number] [facility] [address] [phone] - F [phone]

## 2025-06-23 ENCOUNTER — TELEPHONE (OUTPATIENT)
Dept: FAMILY MEDICINE CLINIC | Age: 30
End: 2025-06-23

## 2025-06-23 DIAGNOSIS — N18.1 TYPE 1 DIABETES MELLITUS WITH STAGE 1 CHRONIC KIDNEY DISEASE (HCC): ICD-10-CM

## 2025-06-23 DIAGNOSIS — E10.22 TYPE 1 DIABETES MELLITUS WITH STAGE 1 CHRONIC KIDNEY DISEASE (HCC): ICD-10-CM

## 2025-06-23 RX ORDER — BLOOD SUGAR DIAGNOSTIC
STRIP MISCELLANEOUS
Qty: 120 STRIP | Refills: 5 | Status: SHIPPED | OUTPATIENT
Start: 2025-06-23

## 2025-06-23 NOTE — ASSESSMENT & PLAN NOTE
Orders:    glucose 4 g chewable tablet; Take 4 tablets by mouth as needed for Low blood sugar    Insulin Pen Needle 32G X 4 MM MISC; Use new pen needle with each insulin dose 2 times daily.

## 2025-06-23 NOTE — TELEPHONE ENCOUNTER
EMETERIO Morocho   Close reason: Other  Payer: Auto Search Patient's Payer Case ID: [id number]    [phone]  Note from payer: Electronic prior authorization not required for NDC. If requesting a quantity above allowable limits, please submit via other method.  View History  Pharmacy Benefits   Open Encounter MAGDIEL CASTREJON FLOR  -  MEDICAID ([facility])    Covered: Retail, Mail Order    Unknown: Specialty, Long-Term Care  Member ID: [id number] BIN: [id number] : 1995   Group ID:   PCN: [id number] Legal sex: F   Group name:    Address: [address] [place] [address]    Medication Being Authorized    Continuous Glucose Sensor (DEXCOM G7 SENSOR) MISC  Apply new sensor every 10 days as directed for continuous glucose monitoring.  Dispense: 3 each Refills: 11   Start: 2025   Class: Normal Diagnoses: Type 1 diabetes mellitus with stage 1 chronic kidney disease (HCC)   This order has been released to its destination.  To be filled at: [facility] [id number] - [place], [place] -

## 2025-06-23 NOTE — ASSESSMENT & PLAN NOTE
Orders:    Continuous Glucose Sensor (DEXCOM G7 SENSOR) MISC; Apply new sensor every 10 days as directed for continuous glucose monitoring.    insulin glargine, 1 unit dial, (TOUJEO SOLOSTAR) 300 UNIT/ML concentrated injection pen; Inject 25 Units into the skin daily    insulin aspart (NOVOLOG FLEXPEN) 100 UNIT/ML injection pen; Inject into the skin 3 times daily approximately 15 minutes prior to meals based on the sliding scale: if glucose is less than 130=0 units, 131-150=2 units, 151-200=4 units, 201-250=6 units, 251-300=8 units, 301-350=10 units, 351-400=12 units, 401 & higher=14 units.  Add additional 1-5 units for carb coverage.  Max daily dose: 60 units.    Insulin Pen Needle 32G X 4 MM MISC; Use new pen needle with each insulin dose 2 times daily.

## 2025-06-24 ENCOUNTER — TELEPHONE (OUTPATIENT)
Dept: FAMILY MEDICINE CLINIC | Age: 30
End: 2025-06-24

## 2025-06-24 NOTE — TELEPHONE ENCOUNTER
Pt called regarding her Dexcom G7 sensor. She stated yesterday her sensor went off saying BS was low around 45-50. She took a glucose tablet but wasn't feeling like her BS was low. She was able to get her BS back up to 77 but her sensor kept going off. Pt stated when she pricked her finger, the monitor showed BS was not low.  She is not sure what to do. Please advise.

## 2025-06-24 NOTE — TELEPHONE ENCOUNTER
She may need to calibrate her Dexcom sensor.  She can also reach out to [employer] Customer Service at [phone] if she believes that there is a technical problem with her sensor.

## 2025-07-15 ENCOUNTER — TELEPHONE (OUTPATIENT)
Dept: FAMILY MEDICINE CLINIC | Age: 30
End: 2025-07-15

## 2025-07-15 DIAGNOSIS — N18.1 TYPE 1 DIABETES MELLITUS WITH STAGE 1 CHRONIC KIDNEY DISEASE (HCC): Primary | ICD-10-CM

## 2025-07-15 DIAGNOSIS — E10.22 TYPE 1 DIABETES MELLITUS WITH STAGE 1 CHRONIC KIDNEY DISEASE (HCC): Primary | ICD-10-CM

## 2025-07-15 RX ORDER — LANCETS
1 EACH MISCELLANEOUS 4 TIMES DAILY
Qty: 120 EACH | Refills: 5 | Status: SHIPPED | OUTPATIENT
Start: 2025-07-15

## 2025-07-15 NOTE — TELEPHONE ENCOUNTER
Pt needs Accu Check Lancets called into [facility]. Pt would like a call when order is sent in. Please advise

## 2025-07-29 ENCOUNTER — OFFICE VISIT (OUTPATIENT)
Dept: FAMILY MEDICINE CLINIC | Age: 30
End: 2025-07-29

## 2025-07-29 VITALS
WEIGHT: 163 LBS | HEART RATE: 88 BPM | DIASTOLIC BLOOD PRESSURE: 60 MMHG | SYSTOLIC BLOOD PRESSURE: 118 MMHG | BODY MASS INDEX: 27.12 KG/M2 | OXYGEN SATURATION: 96 %

## 2025-07-29 DIAGNOSIS — Z97.8 USES SELF-APPLIED CONTINUOUS GLUCOSE MONITORING DEVICE: ICD-10-CM

## 2025-07-29 DIAGNOSIS — E10.649 HYPOGLYCEMIA UNAWARENESS ASSOCIATED WITH TYPE 1 DIABETES MELLITUS (HCC): ICD-10-CM

## 2025-07-29 DIAGNOSIS — N18.1 TYPE 1 DIABETES MELLITUS WITH STAGE 1 CHRONIC KIDNEY DISEASE (HCC): Primary | ICD-10-CM

## 2025-07-29 DIAGNOSIS — R80.9 MICROALBUMINURIA: ICD-10-CM

## 2025-07-29 DIAGNOSIS — F79 INTELLECTUAL DISABILITY: ICD-10-CM

## 2025-07-29 DIAGNOSIS — E10.3522: ICD-10-CM

## 2025-07-29 DIAGNOSIS — E10.3521: ICD-10-CM

## 2025-07-29 DIAGNOSIS — E10.22 TYPE 1 DIABETES MELLITUS WITH STAGE 1 CHRONIC KIDNEY DISEASE (HCC): Primary | ICD-10-CM

## 2025-07-29 LAB — HBA1C MFR BLD: 6.1 %

## 2025-07-30 ENCOUNTER — TELEPHONE (OUTPATIENT)
Dept: FAMILY MEDICINE CLINIC | Age: 30
End: 2025-07-30

## 2025-07-30 LAB
CREAT UR-MCNC: 210 MG/DL (ref 28–259)
MICROALBUMIN UR DL<=1MG/L-MCNC: 3.99 MG/DL
MICROALBUMIN/CREAT UR: 19 MG/G (ref 0–30)

## 2025-07-30 NOTE — TELEPHONE ENCOUNTER
This is the instructions for Novolog: Inject into the skin 3 times daily approximately 15 minutes prior to meals based on the sliding scale: if glucose is less than 130=0 units, 131-150=2 units, 151-200=4 units, 201-250=6 units, 251-300=8 units, 301-350=10 units, 351-400=12 units, 401 & higher=14 units. Add additional 1-5 units for carb coverage. If her glucose is less than 130, but she is going to consume at least 30 or more carbs, she may still want to take 1-2 units to prevent her glucose from getting too high.

## 2025-07-30 NOTE — TELEPHONE ENCOUNTER
Pt wanted to know if she is taking her regular units before a meal, if she eats a carb does she take another unit to compensate for the carbs?  She also wanted to know if her BS is 88-94 does she take any units before eating? She seemed a little confused on how much to take before eating. Please advise

## (undated) DEVICE — SPONGE GZ W4XL8IN COT WVN 12 PLY

## (undated) DEVICE — INTENDED FOR TISSUE SEPARATION, AND OTHER PROCEDURES THAT REQUIRE A SHARP SURGICAL BLADE TO PUNCTURE OR CUT.: Brand: BARD-PARKER ® STAINLESS STEEL BLADES

## (undated) DEVICE — GOWN,ECLIPSE,POLYRNF,BRTHSLV,L,30/CS: Brand: MEDLINE

## (undated) DEVICE — TUBING, SUCTION, 3/16" X 10', STRAIGHT: Brand: MEDLINE

## (undated) DEVICE — COUNTER NDL 30 COUNT FOAM STRP SGL MAG

## (undated) DEVICE — MARKER SURG SKIN UTIL REGULAR/FINE 2 TIP W/ RUL AND 9 LBL

## (undated) DEVICE — SPONGE LAP W18XL18IN WHT COT 4 PLY FLD STRUNG RADPQ DISP ST 2 PER PACK

## (undated) DEVICE — DRAPE,T,LAPARO,TRANS,STERILE: Brand: MEDLINE

## (undated) DEVICE — PENCIL ES CRD L10FT HND SWCHING ROCK SWCH W/ EDGE COAT BLDE

## (undated) DEVICE — PAD,ABDOMINAL,5"X9",ST,LF,25/BX: Brand: MEDLINE INDUSTRIES, INC.

## (undated) DEVICE — YANKAUER,FLEXIBLE HANDLE,REGLR CAPACITY: Brand: MEDLINE INDUSTRIES, INC.

## (undated) DEVICE — GLOVE ORANGE PI 7 1/2   MSG9075

## (undated) DEVICE — ELECTRODE ES AD CRDLSS PT RET REM POLYHESIVE

## (undated) DEVICE — SUTURE PERMA HND 2-0 L18IN NONABSORBABLE BLK X-1 L22IN 1/2 737G

## (undated) DEVICE — SUTURE VICRYL SZ 5-0 L18IN ABSRB UD L13MM P-3 3/8 CIR PRIM J493G

## (undated) DEVICE — Z INACTIVE USE 2863041 SPONGE GZ W4XL4IN 100% COT 16 PLY RADPQ HIGHLY ABSRB

## (undated) DEVICE — Z INACTIVE USE 2660663 SOLUTION IRRIG 1000ML STRIL H2O USP PLAS POUR BTL

## (undated) DEVICE — Z INACTIVE NO ACTIVE SUPPLIER APPLICATOR MEDICATED 26 CC TINT HI-LITE ORNG STRL CHLORAPREP

## (undated) DEVICE — TOWEL,OR,DSP,ST,BLUE,STD,6/PK,12PK/CS: Brand: MEDLINE

## (undated) DEVICE — PACK,BASIC,IX: Brand: MEDLINE

## (undated) DEVICE — Z DISCONTINUED USE 2522778 SUTURE VCRL SZ 3-0 L27IN ABSRB UD L17MM RB-1 1/2 CIR J215H

## (undated) DEVICE — GLOVE ORANGE PI 7   MSG9070